# Patient Record
Sex: FEMALE | Race: OTHER | HISPANIC OR LATINO | Employment: OTHER | URBAN - METROPOLITAN AREA
[De-identification: names, ages, dates, MRNs, and addresses within clinical notes are randomized per-mention and may not be internally consistent; named-entity substitution may affect disease eponyms.]

---

## 2017-04-13 ENCOUNTER — CONVERSION ENCOUNTER (OUTPATIENT)
Dept: RADIOLOGY | Facility: IMAGING CENTER | Age: 72
End: 2017-04-13

## 2018-04-14 ENCOUNTER — CONVERSION ENCOUNTER (OUTPATIENT)
Dept: RADIOLOGY | Facility: IMAGING CENTER | Age: 73
End: 2018-04-14

## 2018-07-25 ENCOUNTER — APPOINTMENT (OUTPATIENT)
Dept: LAB | Facility: IMAGING CENTER | Age: 73
End: 2018-07-25
Payer: COMMERCIAL

## 2018-07-25 ENCOUNTER — TRANSCRIBE ORDERS (OUTPATIENT)
Dept: ADMINISTRATIVE | Facility: HOSPITAL | Age: 73
End: 2018-07-25

## 2018-07-25 DIAGNOSIS — E03.9 HYPOTHYROIDISM, UNSPECIFIED TYPE: Primary | ICD-10-CM

## 2018-07-25 DIAGNOSIS — I10 HYPERTENSION, UNSPECIFIED TYPE: ICD-10-CM

## 2018-07-25 DIAGNOSIS — Z79.899 LONG TERM USE OF DRUG: ICD-10-CM

## 2018-07-25 DIAGNOSIS — Z83.3 FAMILY HISTORY OF DIABETES MELLITUS: ICD-10-CM

## 2018-07-25 DIAGNOSIS — E78.2 MIXED HYPERLIPIDEMIA: ICD-10-CM

## 2018-07-25 DIAGNOSIS — E55.9 VITAMIN D DEFICIENCY, UNSPECIFIED: ICD-10-CM

## 2018-07-25 DIAGNOSIS — E66.01 MORBID OBESITY (HCC): ICD-10-CM

## 2018-07-25 DIAGNOSIS — IMO0002 UNCONTROLLED TYPE 2 DIABETES MELLITUS WITH COMPLICATION, WITHOUT LONG-TERM CURRENT USE OF INSULIN: ICD-10-CM

## 2018-07-25 DIAGNOSIS — Z79.899 LONG TERM USE OF DRUG: Primary | ICD-10-CM

## 2018-07-25 DIAGNOSIS — E03.9 HYPOTHYROIDISM, UNSPECIFIED TYPE: ICD-10-CM

## 2018-07-25 LAB
ALBUMIN SERPL BCP-MCNC: 3.6 G/DL (ref 3.5–5)
ALP SERPL-CCNC: 91 U/L (ref 46–116)
ALT SERPL W P-5'-P-CCNC: 18 U/L (ref 12–78)
ANION GAP SERPL CALCULATED.3IONS-SCNC: 4 MMOL/L (ref 4–13)
AST SERPL W P-5'-P-CCNC: 11 U/L (ref 5–45)
BASOPHILS # BLD AUTO: 0.05 THOUSANDS/ΜL (ref 0–0.1)
BASOPHILS NFR BLD AUTO: 1 % (ref 0–1)
BILIRUB SERPL-MCNC: 0.72 MG/DL (ref 0.2–1)
BUN SERPL-MCNC: 20 MG/DL (ref 5–25)
CALCIUM SERPL-MCNC: 9.7 MG/DL (ref 8.3–10.1)
CHLORIDE SERPL-SCNC: 106 MMOL/L (ref 100–108)
CO2 SERPL-SCNC: 28 MMOL/L (ref 21–32)
CREAT SERPL-MCNC: 0.83 MG/DL (ref 0.6–1.3)
EOSINOPHIL # BLD AUTO: 0.12 THOUSAND/ΜL (ref 0–0.61)
EOSINOPHIL NFR BLD AUTO: 1 % (ref 0–6)
ERYTHROCYTE [DISTWIDTH] IN BLOOD BY AUTOMATED COUNT: 14.6 % (ref 11.6–15.1)
EST. AVERAGE GLUCOSE BLD GHB EST-MCNC: 154 MG/DL
GFR SERPL CREATININE-BSD FRML MDRD: 71 ML/MIN/1.73SQ M
GLUCOSE P FAST SERPL-MCNC: 98 MG/DL (ref 65–99)
HBA1C MFR BLD: 7 % (ref 4.2–6.3)
HCT VFR BLD AUTO: 43 % (ref 34.8–46.1)
HGB BLD-MCNC: 13.5 G/DL (ref 11.5–15.4)
IMM GRANULOCYTES # BLD AUTO: 0.03 THOUSAND/UL (ref 0–0.2)
IMM GRANULOCYTES NFR BLD AUTO: 0 % (ref 0–2)
LYMPHOCYTES # BLD AUTO: 2.77 THOUSANDS/ΜL (ref 0.6–4.47)
LYMPHOCYTES NFR BLD AUTO: 32 % (ref 14–44)
MCH RBC QN AUTO: 26.7 PG (ref 26.8–34.3)
MCHC RBC AUTO-ENTMCNC: 31.4 G/DL (ref 31.4–37.4)
MCV RBC AUTO: 85 FL (ref 82–98)
MONOCYTES # BLD AUTO: 0.68 THOUSAND/ΜL (ref 0.17–1.22)
MONOCYTES NFR BLD AUTO: 8 % (ref 4–12)
NEUTROPHILS # BLD AUTO: 4.94 THOUSANDS/ΜL (ref 1.85–7.62)
NEUTS SEG NFR BLD AUTO: 58 % (ref 43–75)
NRBC BLD AUTO-RTO: 0 /100 WBCS
PLATELET # BLD AUTO: 351 THOUSANDS/UL (ref 149–390)
PMV BLD AUTO: 11 FL (ref 8.9–12.7)
POTASSIUM SERPL-SCNC: 4.1 MMOL/L (ref 3.5–5.3)
PROT SERPL-MCNC: 7.2 G/DL (ref 6.4–8.2)
RBC # BLD AUTO: 5.06 MILLION/UL (ref 3.81–5.12)
SODIUM SERPL-SCNC: 138 MMOL/L (ref 136–145)
T4 FREE SERPL-MCNC: 1.08 NG/DL (ref 0.76–1.46)
TSH SERPL DL<=0.05 MIU/L-ACNC: 2.14 UIU/ML (ref 0.36–3.74)
WBC # BLD AUTO: 8.59 THOUSAND/UL (ref 4.31–10.16)

## 2018-07-25 PROCEDURE — 84439 ASSAY OF FREE THYROXINE: CPT | Performed by: INTERNAL MEDICINE

## 2018-07-25 PROCEDURE — 85025 COMPLETE CBC W/AUTO DIFF WBC: CPT

## 2018-07-25 PROCEDURE — 80053 COMPREHEN METABOLIC PANEL: CPT

## 2018-07-25 PROCEDURE — 83036 HEMOGLOBIN GLYCOSYLATED A1C: CPT

## 2018-07-25 PROCEDURE — 84443 ASSAY THYROID STIM HORMONE: CPT | Performed by: INTERNAL MEDICINE

## 2018-07-25 PROCEDURE — 36415 COLL VENOUS BLD VENIPUNCTURE: CPT

## 2018-10-23 LAB
LEFT EYE DIABETIC RETINOPATHY: NORMAL
RIGHT EYE DIABETIC RETINOPATHY: NORMAL
SEVERITY (EYE EXAM): NORMAL

## 2018-11-26 ENCOUNTER — OFFICE VISIT (OUTPATIENT)
Dept: FAMILY MEDICINE CLINIC | Facility: CLINIC | Age: 73
End: 2018-11-26
Payer: COMMERCIAL

## 2018-11-26 VITALS
HEART RATE: 71 BPM | HEIGHT: 59 IN | DIASTOLIC BLOOD PRESSURE: 60 MMHG | TEMPERATURE: 98 F | SYSTOLIC BLOOD PRESSURE: 110 MMHG | WEIGHT: 217 LBS | RESPIRATION RATE: 16 BRPM | OXYGEN SATURATION: 97 % | BODY MASS INDEX: 43.75 KG/M2

## 2018-11-26 DIAGNOSIS — R16.0 LIVER MASS, LEFT LOBE: Primary | ICD-10-CM

## 2018-11-26 DIAGNOSIS — M79.604 PAIN OF RIGHT LOWER EXTREMITY: ICD-10-CM

## 2018-11-26 DIAGNOSIS — Z23 NEEDS FLU SHOT: ICD-10-CM

## 2018-11-26 PROCEDURE — 99213 OFFICE O/P EST LOW 20 MIN: CPT | Performed by: FAMILY MEDICINE

## 2018-11-26 PROCEDURE — 1036F TOBACCO NON-USER: CPT | Performed by: FAMILY MEDICINE

## 2018-11-26 PROCEDURE — 90662 IIV NO PRSV INCREASED AG IM: CPT

## 2018-11-26 PROCEDURE — 3008F BODY MASS INDEX DOCD: CPT | Performed by: FAMILY MEDICINE

## 2018-11-26 PROCEDURE — G0008 ADMIN INFLUENZA VIRUS VAC: HCPCS

## 2018-11-26 PROCEDURE — 1160F RVW MEDS BY RX/DR IN RCRD: CPT

## 2018-11-26 PROCEDURE — 1160F RVW MEDS BY RX/DR IN RCRD: CPT | Performed by: FAMILY MEDICINE

## 2018-11-26 RX ORDER — DAPAGLIFLOZIN 10 MG/1
10 TABLET, FILM COATED ORAL EVERY MORNING
Refills: 1 | COMMUNITY
Start: 2018-10-03 | End: 2019-05-28 | Stop reason: ALTCHOICE

## 2018-11-26 RX ORDER — LEVOTHYROXINE SODIUM 0.05 MG/1
50 TABLET ORAL DAILY
Refills: 1 | COMMUNITY
Start: 2018-09-12

## 2018-11-26 RX ORDER — ENALAPRIL MALEATE 5 MG/1
5 TABLET ORAL
Refills: 1 | COMMUNITY
Start: 2018-10-21 | End: 2019-10-28 | Stop reason: DRUGHIGH

## 2018-11-26 RX ORDER — LEVOTHYROXINE SODIUM 0.07 MG/1
75 TABLET ORAL DAILY
COMMUNITY

## 2018-11-26 RX ORDER — GLIMEPIRIDE 2 MG/1
1 TABLET ORAL
Refills: 1 | COMMUNITY
Start: 2018-10-21 | End: 2019-06-05 | Stop reason: ALTCHOICE

## 2018-11-26 RX ORDER — LOVASTATIN 40 MG/1
40 TABLET ORAL
Refills: 3 | COMMUNITY
Start: 2018-10-03

## 2018-11-26 RX ORDER — METFORMIN HYDROCHLORIDE 750 MG/1
750 TABLET, EXTENDED RELEASE ORAL 2 TIMES DAILY
Refills: 1 | COMMUNITY
Start: 2018-10-21 | End: 2019-05-28 | Stop reason: ALTCHOICE

## 2018-11-26 NOTE — PATIENT INSTRUCTIONS
Conteo básico de carbohidratos   CUIDADO AMBULATORIO:   El conteo de carbohidratos  es Debbie de planificar jorgito comidas contando la cantidad de carbohidratos de los alimentos  Jenna Carpen son los azúcares, almidones y fibras que se encuentran en las frutas, granos, verduras y productos lácteos  Los carbohidratos [employer] niveles de azúcar en la melissa  El conteo de carbohidratos puede ayudarle a comer la cantidad [place] de carbohidratos para mantener jorgito niveles de glucosa (azúcar) en melissa bajo control  Lo que necesita saber sobre la planificación de las comidas utilizando el conteo de carbohidratos:  · Un dietista o un médico le ayudará a desarrollar un plan alimenticio saludable que trabajará mejor para usted  Le enseñarán cuántos carbohidratos debe consumir o beber en cada comida o merienda  Perez plan alimenticio estará basado en perez edad, peso, dieta usual y [facility]  Si usted tiene diabetes, también incluirá perez nivel de azúcar en melissa y medicamentos para la diabetes  Cuando usted está informado de la cantidad de carbohidratos que debe consumir, entonces puede decidir los tipos de alimentos que quiere comer  · Necesitará saber qué alimentos contienen carbohidratos y cuántos contienen  Lleva la cuenta de la cantidad de carbohidratos en alimentos y meriendas para poder seguir perez plan alimenticio  No evite los carbohidratos ni omita comidas  Si usted no consume suficiente cantidad de carbohidratos u omite comidas, los niveles de azúcar en perez melissa pueden bajar excesivamente    Alimentos que contienen carbohidratos:   · Panes:  Cada porción de comida en la lista siguiente contiene cerca de 15 g de carbohidratos     ¨ 1 rebanada de pan (1 onza) o 1 tortilla de harina o maíz (6 pulgadas)    ¨ La ½ de pan para hamburguesa o ¼ de lanre rosquilla gurdeep (aproximadamente 1 onza)    ¨ 1 panqueque (4 pulgadas en diámetro y ¼ de [place])    · Cereales y granos:  [address] porciones de cereales listos para comer pueden variar  Jayson el tamaño de la porción y la cantidad de carbohidratos alistados en la etiqueta alimenticia  Cada porción de comida en la lista siguiente contiene cerca de 15 g de carbohidratos     ¨ ¾ taza de cereal seco, sin endulzar, listo para comer o ¼ taza de granola baja en grasa     ¨ ½ taza de layla u otros cereales cocidos     ¨ ? taza de arroz o pasta cocida    · Frijoles y vegetales con almidón:  Cada porción de comida en la lista siguiente contiene cerca de 15 g de carbohidratos     ¨ ½ de taza de maíz, chícharos verdes, camote o puré de papa    ¨ ¼ de lanre papa gurdeep horneada    ¨ ½ taza de frijoles, lentejas y guisantes (michele, akbar, jennifer, fitzgerald, partido, de saranya edgar)    · Gilbert y meriendas:  Cada porción de comida en la lista siguiente contiene cerca de 15 g de carbohidratos     ¨ 3 galletas de harina cuadradas u 8 galletas en forma de animalitos     ¨ 6 galletas saladas    ¨ 3 tazas de palomitas de [place] o ¾ onzas de pretzels, honey fritas o totopos    · Frutas:  Cada porción de comida en la lista siguiente contiene cerca de 15 g de carbohidratos     ¨ 1 pieza pequeña (4 onzas) de fruta fresca o ¾ a 1 taza de fruta fresca    ¨ ½ taza de fruta enlatada o congelada, envasada en jugo natural    ¨ ½ taza (4 onzas) de [place] de fruta sin azúcar    ¨ 2 cucharadas de fruta seca    · Alimentos azucarados o postres:  Cada porción de comida en la lista siguiente contiene cerca de 15 g de carbohidratos     ¨ 1 sumaya de 2 pulgadas de pastel sin glaseado o brownie     ¨ 2 galletas dulces pequeñas    ¨ ½ taza de helado, yogur congelado o yogur congelado sin lactosa    ¨ ¼ de taza de sorbete    ¨ 1 cucharada de [place] regular, mermelada o Shaggy    ¨ 2 cucharadas de jarabe ligero    · [place] y yogur:  Los alimentos derivados de la leche contienen cerca de 12 g de carbohidratos por ración      ¨ 1 taza de leche sin grasa o baja en grasa    ¨ [address] soja    ¨ 1 taza de yogur sin grasa que ha sido endulzado con endulzante artificial    · Verduras sin almidón:  Cada porción de comida contiene cerca de 5 g de carbohidratos [place] porciones de vegetales sin almidón cuentan fela 1 porción de carbohidratos  ¨ ½ de taza de verduras cocidas o 1 taza de verduras crudas Estas incluyen remolachas, bróculi, repollo, coliflor, pepino, champiñones, tomates y calabaza  ¨ ½ taza de jugo de verduras  Cómo utilizar el conteo de carbohidratos para planificar las comidas:   · [employer] las cantidades de carbohidratos usando el tamaño de las porciones:      ¨ Ejemplo de lanre ynes de pasta:  Planifica comer pasta, ensalada mixta y un vaso de 8 onzas de [place]  Decker médico le dice que puede consumir 4 porciones de carbohidratos para la ynes  Lanre porción de carbohidratos de pasta es ? de taza  Lanre taza de pasta será igual a 3 porciones de carbohidratos  Un vaso de 8 onzas de leche se cuenta fela 1 porción de carbohidratos  Estas cantidades de alimentos sería igual a 4 porciones de carbohidratos  Lanre taza de ensalada mixta no cuenta para las porciones de carbohidratos  · Cuente la cantidad de carbohidratos utilizando las etiquetas alimenticias:  Busque la cantidad total de los carbohidratos en los alimentos envasados leyendo la etiqueta alimenticia  Las etiquetas alimenticias explican el tamaño de la porción del alimento y la cantidad total de los carbohidratos en cada porción  Busque el tamaño de la porción en la etiqueta alimenticia y después decida cuántas porciones comerá  Multiplique el número de porciones que planea comer por la cantidad de carbohidratos por porción  ¨ Ejemplo de lanre merienda con lanre catalino de granola: Decker plan de comidas le permite consumir 2 porciones de carbohidratos (30 gramos) fela bocadillo  Planea comer 1 paquete de barras de granola, el cual contiene 2 barras  Según la etiqueta alimenticia, el tamaño de la porción en mihir paquete es 1 catalino   Cada porción (1 catalino) contiene 25 gramos de carbohidratos  La cantidad total de carbohidratos en el paquete de barras de granola sería 50 gramos  Basándose en decker plan alimenticio, usted debe de comer sólo 1 catalino de granola  Acuda a jorgito consultas de control con decker médico según le indicaron  Anote jorgito preguntas para que se acuerde de hacerlas munira jorgito visitas  © 2017 [facility] Information is for End User's use only and may not be sold, redistributed or otherwise used for commercial purposes  All illustrations and images included in CareNotes® are the copyrighted property of A AMADEO A M , Inc  or Yovany Lowry  Esta información es sólo para uso en educación  Decker intención no es darle un consejo médico sobre enfermedades o tratamientos  Colsulte con decker Geoff Pinta farmacéutico antes de seguir cualquier régimen médico para saber si es seguro y efectivo para usted

## 2018-11-27 ENCOUNTER — APPOINTMENT (OUTPATIENT)
Dept: LAB | Facility: IMAGING CENTER | Age: 73
End: 2018-11-27
Payer: COMMERCIAL

## 2018-11-27 ENCOUNTER — TRANSCRIBE ORDERS (OUTPATIENT)
Dept: ADMINISTRATIVE | Facility: HOSPITAL | Age: 73
End: 2018-11-27

## 2018-11-27 ENCOUNTER — HOSPITAL ENCOUNTER (OUTPATIENT)
Dept: RADIOLOGY | Facility: IMAGING CENTER | Age: 73
Discharge: HOME | End: 2018-11-27
Payer: COMMERCIAL

## 2018-11-27 DIAGNOSIS — E11.9 TYPE 2 DIABETES MELLITUS WITHOUT COMPLICATION, UNSPECIFIED WHETHER LONG TERM INSULIN USE (HCC): ICD-10-CM

## 2018-11-27 DIAGNOSIS — I10 ESSENTIAL HYPERTENSION, MALIGNANT: ICD-10-CM

## 2018-11-27 DIAGNOSIS — I51.9 MYXEDEMA HEART DISEASE: ICD-10-CM

## 2018-11-27 DIAGNOSIS — E78.00 PURE HYPERCHOLESTEROLEMIA: ICD-10-CM

## 2018-11-27 DIAGNOSIS — E55.9 AVITAMINOSIS D: ICD-10-CM

## 2018-11-27 DIAGNOSIS — E03.9 MYXEDEMA HEART DISEASE: ICD-10-CM

## 2018-11-27 DIAGNOSIS — E66.01 MORBID OBESITY (HCC): ICD-10-CM

## 2018-11-27 DIAGNOSIS — Z79.899 ENCOUNTER FOR LONG-TERM (CURRENT) USE OF MEDICATIONS: ICD-10-CM

## 2018-11-27 DIAGNOSIS — R16.0 LIVER MASS, LEFT LOBE: ICD-10-CM

## 2018-11-27 DIAGNOSIS — E11.9 TYPE 2 DIABETES MELLITUS WITHOUT COMPLICATION, UNSPECIFIED WHETHER LONG TERM INSULIN USE (HCC): Primary | ICD-10-CM

## 2018-11-27 LAB
25(OH)D3 SERPL-MCNC: 53.7 NG/ML (ref 30–100)
ALBUMIN SERPL BCP-MCNC: 3.6 G/DL (ref 3.5–5)
ALP SERPL-CCNC: 105 U/L (ref 46–116)
ALT SERPL W P-5'-P-CCNC: 21 U/L (ref 12–78)
ANION GAP SERPL CALCULATED.3IONS-SCNC: 5 MMOL/L (ref 4–13)
AST SERPL W P-5'-P-CCNC: 12 U/L (ref 5–45)
BASOPHILS # BLD AUTO: 0.04 THOUSANDS/ΜL (ref 0–0.1)
BASOPHILS NFR BLD AUTO: 1 % (ref 0–1)
BILIRUB SERPL-MCNC: 0.94 MG/DL (ref 0.2–1)
BUN SERPL-MCNC: 20 MG/DL (ref 5–25)
CALCIUM ALBUM COR SERPL-MCNC: 10.6 MG/DL (ref 8.3–10.1)
CALCIUM SERPL-MCNC: 10.3 MG/DL (ref 8.3–10.1)
CHLORIDE SERPL-SCNC: 104 MMOL/L (ref 100–108)
CHOLEST SERPL-MCNC: 136 MG/DL (ref 50–200)
CO2 SERPL-SCNC: 26 MMOL/L (ref 21–32)
CREAT SERPL-MCNC: 0.85 MG/DL (ref 0.6–1.3)
EOSINOPHIL # BLD AUTO: 0.09 THOUSAND/ΜL (ref 0–0.61)
EOSINOPHIL NFR BLD AUTO: 1 % (ref 0–6)
ERYTHROCYTE [DISTWIDTH] IN BLOOD BY AUTOMATED COUNT: 15 % (ref 11.6–15.1)
GFR SERPL CREATININE-BSD FRML MDRD: 69 ML/MIN/1.73SQ M
GLUCOSE P FAST SERPL-MCNC: 168 MG/DL (ref 65–99)
HCT VFR BLD AUTO: 44.6 % (ref 34.8–46.1)
HDLC SERPL-MCNC: 55 MG/DL (ref 40–60)
HGB BLD-MCNC: 14.3 G/DL (ref 11.5–15.4)
IMM GRANULOCYTES # BLD AUTO: 0.03 THOUSAND/UL (ref 0–0.2)
IMM GRANULOCYTES NFR BLD AUTO: 0 % (ref 0–2)
LDLC SERPL CALC-MCNC: 51 MG/DL (ref 0–100)
LYMPHOCYTES # BLD AUTO: 1.39 THOUSANDS/ΜL (ref 0.6–4.47)
LYMPHOCYTES NFR BLD AUTO: 20 % (ref 14–44)
MCH RBC QN AUTO: 27.7 PG (ref 26.8–34.3)
MCHC RBC AUTO-ENTMCNC: 32.1 G/DL (ref 31.4–37.4)
MCV RBC AUTO: 86 FL (ref 82–98)
MONOCYTES # BLD AUTO: 0.72 THOUSAND/ΜL (ref 0.17–1.22)
MONOCYTES NFR BLD AUTO: 10 % (ref 4–12)
NEUTROPHILS # BLD AUTO: 4.64 THOUSANDS/ΜL (ref 1.85–7.62)
NEUTS SEG NFR BLD AUTO: 68 % (ref 43–75)
NONHDLC SERPL-MCNC: 81 MG/DL
NRBC BLD AUTO-RTO: 0 /100 WBCS
PLATELET # BLD AUTO: 327 THOUSANDS/UL (ref 149–390)
PMV BLD AUTO: 11.7 FL (ref 8.9–12.7)
POTASSIUM SERPL-SCNC: 4.2 MMOL/L (ref 3.5–5.3)
PROT SERPL-MCNC: 7.4 G/DL (ref 6.4–8.2)
RBC # BLD AUTO: 5.17 MILLION/UL (ref 3.81–5.12)
SODIUM SERPL-SCNC: 135 MMOL/L (ref 136–145)
T4 FREE SERPL-MCNC: 1.12 NG/DL (ref 0.76–1.46)
TRIGL SERPL-MCNC: 150 MG/DL
TSH SERPL DL<=0.05 MIU/L-ACNC: 2.4 UIU/ML (ref 0.36–3.74)
WBC # BLD AUTO: 6.91 THOUSAND/UL (ref 4.31–10.16)

## 2018-11-27 PROCEDURE — 76705 ECHO EXAM OF ABDOMEN: CPT

## 2018-11-27 PROCEDURE — 36415 COLL VENOUS BLD VENIPUNCTURE: CPT

## 2018-11-27 PROCEDURE — 85025 COMPLETE CBC W/AUTO DIFF WBC: CPT

## 2018-11-27 PROCEDURE — 82306 VITAMIN D 25 HYDROXY: CPT

## 2018-11-27 PROCEDURE — 80053 COMPREHEN METABOLIC PANEL: CPT

## 2018-11-27 PROCEDURE — 80061 LIPID PANEL: CPT

## 2018-11-27 PROCEDURE — 84439 ASSAY OF FREE THYROXINE: CPT

## 2018-11-27 PROCEDURE — 83036 HEMOGLOBIN GLYCOSYLATED A1C: CPT

## 2018-11-27 PROCEDURE — 84443 ASSAY THYROID STIM HORMONE: CPT

## 2018-11-28 ENCOUNTER — TELEPHONE (OUTPATIENT)
Dept: FAMILY MEDICINE CLINIC | Facility: CLINIC | Age: 73
End: 2018-11-28

## 2018-11-28 LAB
EST. AVERAGE GLUCOSE BLD GHB EST-MCNC: 169 MG/DL
HBA1C MFR BLD: 7.5 % (ref 4.2–6.3)

## 2018-12-04 ENCOUNTER — TELEPHONE (OUTPATIENT)
Dept: FAMILY MEDICINE CLINIC | Facility: CLINIC | Age: 73
End: 2018-12-04

## 2018-12-04 NOTE — TELEPHONE ENCOUNTER
Left message to patient to call back regarding below task, need to schedule appt with dr Barry JARAMILLO

## 2018-12-05 ENCOUNTER — OFFICE VISIT (OUTPATIENT)
Dept: FAMILY MEDICINE CLINIC | Facility: CLINIC | Age: 73
End: 2018-12-05
Payer: COMMERCIAL

## 2018-12-05 VITALS
HEIGHT: 59 IN | SYSTOLIC BLOOD PRESSURE: 120 MMHG | DIASTOLIC BLOOD PRESSURE: 60 MMHG | TEMPERATURE: 99.4 F | WEIGHT: 214.2 LBS | HEART RATE: 89 BPM | BODY MASS INDEX: 43.18 KG/M2 | RESPIRATION RATE: 20 BRPM | OXYGEN SATURATION: 96 %

## 2018-12-05 DIAGNOSIS — J40 BRONCHITIS: Primary | ICD-10-CM

## 2018-12-05 DIAGNOSIS — E11.9 TYPE 2 DIABETES MELLITUS WITHOUT COMPLICATION, WITH LONG-TERM CURRENT USE OF INSULIN (HCC): ICD-10-CM

## 2018-12-05 DIAGNOSIS — R05.9 COUGH: ICD-10-CM

## 2018-12-05 DIAGNOSIS — Z79.4 TYPE 2 DIABETES MELLITUS WITHOUT COMPLICATION, WITH LONG-TERM CURRENT USE OF INSULIN (HCC): ICD-10-CM

## 2018-12-05 PROCEDURE — 99214 OFFICE O/P EST MOD 30 MIN: CPT | Performed by: NURSE PRACTITIONER

## 2018-12-05 PROCEDURE — 3008F BODY MASS INDEX DOCD: CPT | Performed by: NURSE PRACTITIONER

## 2018-12-05 PROCEDURE — 1160F RVW MEDS BY RX/DR IN RCRD: CPT | Performed by: NURSE PRACTITIONER

## 2018-12-05 PROCEDURE — 4040F PNEUMOC VAC/ADMIN/RCVD: CPT | Performed by: NURSE PRACTITIONER

## 2018-12-05 RX ORDER — AZITHROMYCIN 250 MG/1
TABLET, FILM COATED ORAL
Qty: 6 TABLET | Refills: 0 | Status: SHIPPED | OUTPATIENT
Start: 2018-12-05 | End: 2018-12-09

## 2018-12-05 RX ORDER — BENZONATATE 200 MG/1
200 CAPSULE ORAL 3 TIMES DAILY PRN
Qty: 20 CAPSULE | Refills: 0 | Status: SHIPPED | OUTPATIENT
Start: 2018-12-05 | End: 2018-12-07 | Stop reason: SDUPTHER

## 2018-12-05 NOTE — ASSESSMENT & PLAN NOTE
Recommended to use benzonatate 200mg and conservative measures discussed with patient  If s/s worsen or do not improve after 1 week to return to clinic  Pt in agreement with plan of care

## 2018-12-05 NOTE — PROGRESS NOTES
Assessment/Plan:    Bronchitis  I am starting patient on zithromax and tessalon pills for bronchitis  I Recommended warm moist air, hyrdation, warm facial packs, turning on hot shower and inhaling steam to help ease with breathing  Avoid extremely cool and dry air  Nasal saline may provide temporary relief of congestion by removing nasal crusts and dried secretions  Drinking 8 or more 8-oz glasses of water, juice, or noncaffeinated beverages daily may help to thin mucous secretions and replace fluid losses  Sipping hot water or warm drinks may be more soothing to the nasal passages than ice cold drinks  Should follow up if symptoms do not improve, worsen within 72 hours, or recur  Cough  Recommended to use benzonatate 200mg and conservative measures discussed with patient  If s/s worsen or do not improve after 1 week to return to clinic  Pt in agreement with plan of care  Type 2 diabetes mellitus without complication, with long-term current use of insulin ([facility])  Lab Results   Component Value Date    HGBA1C 7 5 (H) 11/27/2018       No results for input(s): POCGLU in the last 72 hours  Blood Sugar Average: Last 72 hrs:     Pt to continue on metformin, amaryl, farxiga and dulaglutide injections  No changes made to regiment  Advised to keep appointment every 3-6 months for DM check  No tussin given as this can raise her blood sugar  Discussed with patient and pt ok with benzonatate for cough  Diagnoses and all orders for this visit:    Bronchitis  -     azithromycin (ZITHROMAX) 250 mg tablet; Take 2 tablets today then 1 tablet daily x 4 days  -     benzonatate (TESSALON) 200 MG capsule; Take 1 capsule (200 mg total) by mouth 3 (three) times a day as needed for cough    Cough    Type 2 diabetes mellitus without complication, with long-term current use of insulin ([facility])          Subjective:      Patient ID: Amelia Flowers is a 72 y o  female      Last week had flu shot and her body started aching on Thursday and the entire week  Coughing a lot x 1 week  Its constant with the cough  Admits to nasal congestion  Low grade fever per patient  Cough   This is a new problem  The current episode started in the past 7 days  The problem has been gradually worsening  The problem occurs constantly  The cough is productive of sputum  Associated symptoms include chills, a fever, headaches, nasal congestion, postnasal drip and wheezing  Pertinent negatives include no chest pain, ear congestion, ear pain or sore throat  The symptoms are aggravated by cold air  She has tried OTC cough suppressant for the symptoms  The treatment provided mild relief  Her past medical history is significant for bronchitis  There is no history of asthma, COPD, environmental allergies or pneumonia  The following portions of the patient's history were reviewed and updated as appropriate: allergies, current medications, past family history, past medical history, past social history, past surgical history and problem list     Review of Systems   Constitutional: Positive for chills and fever  HENT: Positive for congestion and postnasal drip  Negative for ear discharge, ear pain, sneezing and sore throat  Eyes: Negative  Respiratory: Positive for cough and wheezing  Cardiovascular: Negative  Negative for chest pain and palpitations  Gastrointestinal: Negative  Endocrine: Negative  Negative for cold intolerance, polydipsia, polyphagia and polyuria  Musculoskeletal: Negative  Skin: Negative  Allergic/Immunologic: Negative for environmental allergies  Neurological: Positive for headaches  Objective:      /60   Pulse 89   Temp 99 4 °F (37 4 °C) (Temporal)   Resp 20   Ht 4' 11" (1 499 m)   Wt 97 2 kg (214 lb 3 2 oz)   SpO2 96%   BMI 43 26 kg/m²          Physical Exam   Constitutional: She is oriented to person, place, and time  She appears well-developed and well-nourished  No distress     HENT:   Head: Normocephalic and atraumatic  Right Ear: Hearing, tympanic membrane, external ear and ear canal normal    Left Ear: Hearing, tympanic membrane, external ear and ear canal normal    Nose: Rhinorrhea present  Mouth/Throat: Uvula is midline and mucous membranes are normal  Posterior oropharyngeal erythema present  Eyes: Pupils are equal, round, and reactive to light  Conjunctivae and EOM are normal    Neck: Normal range of motion  Neck supple  Cardiovascular: Normal rate, regular rhythm and normal heart sounds  Pulmonary/Chest: Effort normal and breath sounds normal  No respiratory distress  She has no wheezes  Abdominal: Soft  Bowel sounds are normal    Musculoskeletal: Normal range of motion  Lymphadenopathy:     She has cervical adenopathy  Neurological: She is alert and oriented to person, place, and time  Skin: Skin is warm and dry  She is not diaphoretic  Nursing note and vitals reviewed

## 2018-12-05 NOTE — ASSESSMENT & PLAN NOTE
I am starting patient on zithromax and tessalon pills for bronchitis  I Recommended warm moist air, hyrdation, warm facial packs, turning on hot shower and inhaling steam to help ease with breathing  Avoid extremely cool and dry air  Nasal saline may provide temporary relief of congestion by removing nasal crusts and dried secretions  Drinking 8 or more 8-oz glasses of water, juice, or noncaffeinated beverages daily may help to thin mucous secretions and replace fluid losses  Sipping hot water or warm drinks may be more soothing to the nasal passages than ice cold drinks  Should follow up if symptoms do not improve, worsen within 72 hours, or recur

## 2018-12-05 NOTE — PATIENT INSTRUCTIONS
Bronquitis aguda   LO QUE NECESITA SABER:   ¿Qué es la bronquitis aguda? La bronquitis aguda es la inflamación e irritación de jorgito vías respiratorias  Esta irritación puede provocar que tosa o que tenga otros problemas de respiración  La bronquitis aguda suele comenzar debido a otra enfermedad, fela un resfriado o la gripe  La enfermedad se propaga de perez nariz y garganta a perez tráquea y [place]  La bronquitis a menudo es conocida fela resfriado de pecho  La bronquitis aguda generalmente dura alrededor de 3 a 6 semanas y no es lanre enfermedad grave  ¿Qué provoca la bronquitis aguda? · Infección  provocada por virus, bacteria o por hongos  · El aire contaminado  provocado por vapores químicos, polvo, humo, alérgenos o contaminación  ¿Qué aumenta mi riesgo de bronquitis aguda? · La edad, usualmente adultos [place]    · Fumar cigarrillos o estar alrededor de el humo del cigarro    · Enfermedades crónicas del pulmón o infecicones crónicas del seno    · Un sistema inmune débil    · La enfermedad por reflujo gastroesofágico o [place]    · Alergias y cambios ambientales  ¿Cuáles son los signos y síntomas de la bronquitis aguda? · Tos con esputo que podría ser [place] o sandy    · Sentirse más cansado de lo normal y pipo corporales    · Comoros y escalofríos    · Resuello cuando respira    · Opresión en el pecho o dolor cuando respira o tose  ¿Cómo se diagnostica la bronquitis aguda? Perez médico podría diagnosticarlo con bronquitis por jorgito síntomas  Si él no está seguro, es posible que usted necesite lo siguiente:  · Los análisis de melissa:  serán realizados para determinar si jorgito síntomas son provocados por lanre infección  · Radiografía  de jorgito pulmones y de perez corazón podrían mostrar signos de infección, fela neumonía  Inidgo Fake de tórax también puede mostrar líquido alrededor del corazón y los pulmones  ¿Cómo se trata la bronquitis crónica?   Perez médico tratará cualquier [employer] le haya provocado perez bronquitis aguda  También podría darle cualquiera de lo siguiente:  · El ibuprofeno o el acetaminofeno  son medicamentos que pueden ayudar a bajar perez fiebre  Están disponibles sin receta médica  Consulte con perez médico cuál medicamento es el adecuado para usted  Pregunte la cantidad y la frecuencia con que debe tomarlos  [address]  Estos medicamentos pueden provocar sangrado estomacal si no se jolly correctamente  El ibuprofeno puede provocar daño al Cristobal Schooner  No tome ibuprofeno si usted tiene enfermedad de los riñones, [place] o si es alérgico a la aspirina  El acetaminofeno puede dañar el hígado  No tome más de 4,000 miligramos en 24 horas  · Los descongestionantes  ayudan a despejar la mucosidad en jorgito pulmones y que sea más fácil expectorarla  [place] puede ayudarle a respirar mejor  · Los jarabes para la tos  disminuyen perez necesidad de toser  Si perez tos produce mucosidad, no tome un supresor de la tos a menos que perez médico se lo indique  Perez médico podría sugerirle que tome un supresor de la tos en la noche para que pueda descansar  · Inhaladores  podrían ser Lendell Pitch  Perez médico podría darle bruce o más inhaladores para ayudarle a respirar más fácil y Patti Pacini menos tos  Un inhalador le administra medicamento para abrir jorgito vías aéreas  Pídale a perez médico que le muestre cómo usar perez inhalador correctamente  ¿Cómo me puedo cuidar cuando tengo bronquitis aguda? · Descanse más  El descanso ayuda a perez cuerpo a sanar  Empiece a hacer un poco más día a día  Descanse cuando usted sienta que es necesario  · Evite irritantes en el aire  Evite productos químicos, gases y polvo  Use lanre mascarilla si tiene que trabajar alrededor de polvo o gases  Permanezca dentro cuando los niveles de contaminación teresa altos  Si tiene alergias, permanezca adentro cuando el conteo de polen sea [place]OS[place]AN[place]BY   No use productos en aerosol, fela desodorante, aerosol contra los insectos y aerosol para el norma  · No fume o esté alrededor de personas que fuman  La nicotina y otros químicos en los cigarrillos y cigarros dañan la cilia que saca la mucosidad de jorgito pulmones  Pida información a perez médico si usted actualmente fuma y necesita ayuda para dejar de fumar  Los cigarrillos electrónicos o tabaco sin humo todavía contienen nicotina  Consulte con perez médico antes de [employer]  · [address] se le haya indicado  Los líquidos ayudan a mantener humectadas jorgito vías respiratorias y ayudarle a eliminar las flemas por medio de la tos  Es posible que usted necesite jared más líquidos si tiene bronquitis [place]  Pregunte cuánto líquido debe jared cada día y cuáles líquidos son los más adecuados para usted  · Use un humidificador o vaporizador  Use un humidificador de talib frío o un vaporizador para elevar la humedad en perez casa  [place] podría ayudarle a respirar más fácilmente y al mismo tiempo disminuir la tos  ¿Cómo puedo disminuir mi riesgo para la bronquitis aguda? · Vaya a que le pongan las vacunas necesarias  Pregúntele a perez médico si usted debería ser vacunado contra la gripe o la neumonía  · Evite la propagación de gérmenes  Usted puede disminuir perez riesgo de bronquitis [place] y otras enfermedades de la siguiente manera:     ¨ [place] frecuentemente con agua y [place]  Lleve lanre loción o gel antiséptico para las milan  Usted puede usar la loción o el gel para limpiar jorgito milan cuando no haya agua disponible  ¨ No se toque los ojos, la nariz o la boca a menos que se haya lavado las milan aren  ¨ Siempre cubra perez boca al toser para evitar la propagación de gérmenes  Lo mejor es toser en un pañuelo desechable o en la manga de perez camisa, en lugar de en perez mano  Pídale a los que le rodean que cubran jorgito bocas al toser  ¨ Trate de evitar a las personas que están resfriadas o tienen gripe   Si usted está enfermo, manténgase alejado de otras personas lo [employer] pueda   ¿Cuándo hung buscar atención inmediata? · Usted expectora melissa  · Carol labios o uñas de los dedos se ponen azules  · Usted siente que no está recibiendo suficiente aire cuando respira  ¿Cuándo hung comunicarme con mi médico?   · Usted tiene fiebre  · Carol problemas respiratorios no desaparecen o empeoran  · Addison tos no mejora dentro de 4 semanas  · Usted tiene preguntas o inquietudes acerca de addison condición o cuidado  ACUERDOS SOBRE ADDISON CUIDADO:   Usted tiene el derecho de ayudar a planear addison cuidado  Aprenda todo lo que pueda sobre addison condición y fela darle tratamiento  Discuta carol opciones de tratamiento con carol médicos para decidir el cuidado que usted desea recibir  Usted siempre tiene el derecho de rechazar el tratamiento  Esta información es sólo para uso en educación  Addison intención no es darle un consejo médico sobre enfermedades o tratamientos  Colsulte con addison Ozie Sharp farmacéutico antes de seguir cualquier régimen médico para saber si es seguro y efectivo para usted  © 2017 [address] Information is for End User's use only and may not be sold, redistributed or otherwise used for commercial purposes  All illustrations and images included in CareNotes® are the copyrighted property of A D A M , Inc  or Yovany Lowry

## 2018-12-07 DIAGNOSIS — J40 BRONCHITIS: ICD-10-CM

## 2018-12-10 RX ORDER — BENZONATATE 200 MG/1
200 CAPSULE ORAL 3 TIMES DAILY PRN
Qty: 20 CAPSULE | Refills: 0 | Status: SHIPPED | OUTPATIENT
Start: 2018-12-10 | End: 2019-06-05 | Stop reason: ALTCHOICE

## 2018-12-19 ENCOUNTER — OFFICE VISIT (OUTPATIENT)
Dept: FAMILY MEDICINE CLINIC | Facility: CLINIC | Age: 73
End: 2018-12-19
Payer: COMMERCIAL

## 2018-12-19 VITALS
RESPIRATION RATE: 16 BRPM | WEIGHT: 218 LBS | OXYGEN SATURATION: 96 % | SYSTOLIC BLOOD PRESSURE: 120 MMHG | DIASTOLIC BLOOD PRESSURE: 70 MMHG | HEIGHT: 59 IN | TEMPERATURE: 98 F | HEART RATE: 68 BPM | BODY MASS INDEX: 43.95 KG/M2

## 2018-12-19 DIAGNOSIS — K80.20 GALLSTONES: ICD-10-CM

## 2018-12-19 DIAGNOSIS — R16.0 LIVER MASSES: Primary | ICD-10-CM

## 2018-12-19 PROCEDURE — 1036F TOBACCO NON-USER: CPT | Performed by: FAMILY MEDICINE

## 2018-12-19 PROCEDURE — 99214 OFFICE O/P EST MOD 30 MIN: CPT | Performed by: FAMILY MEDICINE

## 2018-12-19 PROCEDURE — 1160F RVW MEDS BY RX/DR IN RCRD: CPT | Performed by: FAMILY MEDICINE
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
## 2018-12-19 NOTE — PATIENT INSTRUCTIONS
Conteo básico de carbohidratos   CUIDADO AMBULATORIO:   El conteo de carbohidratos  es Debbie de planificar jorgito comidas contando la cantidad de carbohidratos de los alimentos  [place] son los azúcares, almidones y fibras que se encuentran en las frutas, granos, verduras y productos lácteos  Los carbohidratos [employer] niveles de azúcar en la melissa  El conteo de carbohidratos puede ayudarle a comer la cantidad [place] de carbohidratos para mantener jorgito niveles de glucosa (azúcar) en melissa bajo control  Lo que necesita saber sobre la planificación de las comidas utilizando el conteo de carbohidratos:  · Un dietista o un médico le ayudará a desarrollar un plan alimenticio saludable que trabajará mejor para usted  Le enseñarán cuántos carbohidratos debe consumir o beber en cada comida o merienda  Perez plan alimenticio estará basado en perez edad, peso, dieta usual y [address]  Si usted tiene diabetes, también incluirá perez nivel de azúcar en melissa y medicamentos para la diabetes  Cuando usted está informado de la cantidad de carbohidratos que debe consumir, entonces puede decidir los tipos de alimentos que quiere comer  · Necesitará saber qué alimentos contienen carbohidratos y cuántos contienen  Lleva la cuenta de la cantidad de carbohidratos en alimentos y meriendas para poder seguir perez plan alimenticio  No evite los carbohidratos ni omita comidas  Si usted no consume suficiente cantidad de carbohidratos u omite comidas, los niveles de azúcar en perez melissa pueden bajar excesivamente    Alimentos que contienen carbohidratos:   · Panes:  Cada porción de comida en la lista siguiente contiene cerca de 15 g de carbohidratos     ¨ 1 rebanada de pan (1 onza) o 1 tortilla de harina o maíz (6 pulgadas)    ¨ La ½ de pan para hamburguesa o ¼ de lanre rosquilla gurdeep (aproximadamente 1 onza)    ¨ 1 panqueque (4 pulgadas en diámetro y ¼ de [place])    · Cereales y granos:  [address] porciones de cereales listos para comer pueden variar  Jayson el tamaño de la porción y la cantidad de carbohidratos alistados en la etiqueta alimenticia  Cada porción de comida en la lista siguiente contiene cerca de 15 g de carbohidratos     ¨ ¾ taza de cereal seco, sin endulzar, listo para comer o ¼ taza de granola baja en grasa     ¨ ½ taza de layla u otros cereales cocidos     ¨ ? taza de arroz o pasta cocida    · Frijoles y vegetales con almidón:  Cada porción de comida en la lista siguiente contiene cerca de 15 g de carbohidratos     ¨ ½ de taza de maíz, chícharos verdes, camote o puré de papa    ¨ ¼ de lanre papa gurdeep horneada    ¨ ½ taza de frijoles, lentejas y guisantes (michele, akbar, jennifer, fitzgerald, partido, de saranya edgar)    · [place] y meriendas:  Cada porción de comida en la lista siguiente contiene cerca de 15 g de carbohidratos     ¨ 3 galletas de harina cuadradas u 8 galletas en forma de animalitos     ¨ 6 galletas saladas    ¨ 3 tazas de palomitas de [place] o ¾ onzas de pretzels, honey fritas o totopos    · Frutas:  Cada porción de comida en la lista siguiente contiene cerca de 15 g de carbohidratos     ¨ 1 pieza pequeña (4 onzas) de fruta fresca o ¾ a 1 taza de fruta fresca    ¨ ½ taza de fruta enlatada o congelada, envasada en jugo natural    ¨ ½ taza (4 onzas) de [place] de fruta sin azúcar    ¨ 2 cucharadas de fruta seca    · Alimentos azucarados o postres:  Cada porción de comida en la lista siguiente contiene cerca de 15 g de carbohidratos     ¨ 1 sumaya de 2 pulgadas de pastel sin glaseado o brownie     ¨ 2 galletas dulces pequeñas    ¨ ½ taza de helado, yogur congelado o yogur congelado sin lactosa    ¨ ¼ de taza de sorbete    ¨ 1 cucharada de [place] regular, mermelada o Shaggy    ¨ 2 cucharadas de jarabe ligero    · [place] y yogur:  Los alimentos derivados de la leche contienen cerca de 12 g de carbohidratos por ración      ¨ 1 taza de leche sin grasa o baja en grasa    ¨ [address] soja    ¨ 1 taza de yogur sin grasa que ha sido endulzado con endulzante artificial    · Verduras sin almidón:  Cada porción de comida contiene cerca de 5 g de carbohidratos [place] porciones de vegetales sin almidón cuentan fela 1 porción de carbohidratos  ¨ ½ de taza de verduras cocidas o 1 taza de verduras crudas [place] incluyen remolachas, bróculi, repollo, coliflor, pepino, champiñones, tomates y calabaza  ¨ ½ taza de jugo de verduras  Cómo utilizar el conteo de carbohidratos para planificar las comidas:   · [employer] las cantidades de carbohidratos usando el tamaño de las porciones:      ¨ Ejemplo de lanre ynes de pasta:  Planifica comer pasta, ensalada mixta y un vaso de 8 onzas de [place]  Decker médico le dice que puede consumir 4 porciones de carbohidratos para la ynes  Lanre porción de carbohidratos de pasta es ? de taza  Lanre taza de pasta será igual a 3 porciones de carbohidratos  Un vaso de 8 onzas de leche se cuenta fela 1 porción de carbohidratos  Estas cantidades de alimentos sería igual a 4 porciones de carbohidratos  Lanre taza de ensalada mixta no cuenta para las porciones de carbohidratos  · Cuente la cantidad de carbohidratos utilizando las etiquetas alimenticias:  Busque la cantidad total de los carbohidratos en los alimentos envasados leyendo la etiqueta alimenticia  Las etiquetas alimenticias explican el tamaño de la porción del alimento y la cantidad total de los carbohidratos en cada porción  Busque el tamaño de la porción en la etiqueta alimenticia y después decida cuántas porciones comerá  Multiplique el número de porciones que planea comer por la cantidad de carbohidratos por porción  ¨ Ejemplo de lanre merienda con lanre catalino de granola: Decker plan de comidas le permite consumir 2 porciones de carbohidratos (30 gramos) fela bocadillo  Planea comer 1 paquete de barras de granola, el cual contiene 2 barras  Según la etiqueta alimenticia, el tamaño de la porción en mihir paquete es 1 catalino   Cada porción (1 catalino) contiene 25 gramos de carbohidratos  La cantidad total de carbohidratos en el paquete de barras de granola sería 50 gramos  Basándose en decker plan alimenticio, usted debe de comer sólo 1 catalino de granola  Acuda a jorgito consultas de control con decker médico según le indicaron  Anote jorgito preguntas para que se acuerde de hacerlas munira jorgito visitas  © 2017 [address] [facility] Information is for End User's use only and may not be sold, redistributed or otherwise used for commercial purposes  All illustrations and images included in CareNotes® are the copyrighted property of [employer] M [employer] Inc  or Yovany Lowry  Esta información es sólo para uso en educación  Decker intención no es darle un consejo médico sobre enfermedades o tratamientos  Colsulte con decker Venus [employer] farmacéutico antes de seguir cualquier régimen médico para saber si es seguro y efectivo para usted

## 2018-12-21 ENCOUNTER — OFFICE VISIT (OUTPATIENT)
Dept: FAMILY MEDICINE CLINIC | Facility: CLINIC | Age: 73
End: 2018-12-21
Payer: COMMERCIAL

## 2018-12-21 VITALS
HEIGHT: 59 IN | DIASTOLIC BLOOD PRESSURE: 72 MMHG | OXYGEN SATURATION: 98 % | WEIGHT: 213 LBS | BODY MASS INDEX: 42.94 KG/M2 | TEMPERATURE: 98.4 F | HEART RATE: 72 BPM | SYSTOLIC BLOOD PRESSURE: 122 MMHG | RESPIRATION RATE: 16 BRPM

## 2018-12-21 DIAGNOSIS — K21.9 GASTROESOPHAGEAL REFLUX DISEASE WITHOUT ESOPHAGITIS: Primary | ICD-10-CM

## 2018-12-21 PROCEDURE — G0439 PPPS, SUBSEQ VISIT: HCPCS | Performed by: NURSE PRACTITIONER

## 2018-12-21 RX ORDER — OMEPRAZOLE 20 MG/1
20 CAPSULE, DELAYED RELEASE ORAL DAILY
Qty: 30 CAPSULE | Refills: 1 | Status: SHIPPED | OUTPATIENT
Start: 2018-12-21 | End: 2019-02-09 | Stop reason: SDUPTHER

## 2018-12-21 NOTE — PROGRESS NOTES
Assessment and Plan:    Problem List Items Addressed This Visit     None      Visit Diagnoses     Gastroesophageal reflux disease without esophagitis    -  Primary    Relevant Medications    omeprazole (PriLOSEC) 20 mg delayed release capsule        Health Maintenance Due   Topic Date Due    Hepatitis C Screening  1945   Radha Doran Medicare Annual Wellness Visit (AWV)  1945    Diabetic Foot Exam  03/26/1957    DM Eye Exam  03/26/1957    URINE MICROALBUMIN  03/26/1957    DTaP,Tdap,and Td Vaccines (1 - Tdap) 03/26/1968    Pneumococcal PPSV23/PCV13 65+ Years / Low and Medium Risk (2 of 2 - PCV13) 12/01/2015         HPI:  Jorge A Arias is a 72 y o  female here for her Subsequent Wellness Visit      Patient Active Problem List   Diagnosis    Type II or unspecified type diabetes mellitus without mention of complication, not stated as uncontrolled    Hypothyroidism    Bronchitis    Cough    Type 2 diabetes mellitus without complication, with long-term current use of insulin (HCC)    Hypercalcemia     Past Medical History:   Diagnosis Date    Diabetes ([address] )     Hyperlipidemia     Hypertension     Obesity     Thyroid disease      Past Surgical History:   Procedure Laterality Date    CATARACT EXTRACTION      COLONOSCOPY  02/15/2016    small polyp, repeat 5yrs    HYSTERECTOMY       Family History   Problem Relation Age of Onset    Diabetes Father     Hypertension Father      History   Smoking Status    Never Smoker   Smokeless Tobacco    Never Used     History   Alcohol Use No      History   Drug Use No       Current Outpatient Prescriptions   Medication Sig Dispense Refill    ASPIRIN 81 PO Take 81 mg by mouth      benzonatate (TESSALON) 200 MG capsule Take 1 capsule (200 mg total) by mouth 3 (three) times a day as needed for cough 20 capsule 0    diclofenac sodium (VOLTAREN) 1 % Apply 2 g topically 4 (four) times a day 100 g 2    Dulaglutide 1 5 MG/0 5ML SOPN Inject 1 5 mg under the skin      enalapril (VASOTEC) 5 mg tablet Take 5 mg by mouth daily at bedtime  1    Ergocalciferol (VITAMIN D2 PO) Take 50,000 Units by mouth      FARXIGA 10 MG TABS Take 10 mg by mouth every morning  1    glimepiride (AMARYL) 2 mg tablet Take 1 tablet by mouth  1    levothyroxine 50 mcg tablet TAKE ONE TABLET IN THE MORNING (MONDAY TO FRIDAY)  1    levothyroxine 75 mcg tablet Take 75 mcg by mouth daily      lovastatin (MEVACOR) 40 MG tablet Take 40 mg by mouth daily at bedtime  3    metFORMIN (GLUCOPHAGE-XR) 750 mg 24 hr tablet Take 750 mg by mouth 2 (two) times a day  1    omeprazole (PriLOSEC) 20 mg delayed release capsule Take 1 capsule (20 mg total) by mouth daily 30 capsule 1     No current facility-administered medications for this visit  Allergies   Allergen Reactions    No Known Allergies      Immunization History   Administered Date(s) Administered    Influenza 01/10/2013, 10/30/2015, 11/01/2016, 10/02/2017, 11/26/2018    Influenza Split 11/19/2013, 12/18/2014    Influenza Split High Dose Preservative Free IM 10/02/2017    Influenza, high dose seasonal 0 5 mL 11/26/2018    Pneumococcal Polysaccharide PPV23 12/01/2014       Patient Care Team:  Shanel Pereira MD as PCP - General (Family Medicine)    Medicare Screening Tests and Risk Assessments:  Isabel Talley is here for her Subsequent Wellness visit  Last Medicare Wellness visit information reviewed, patient interviewed and updates made to the record today  Health Risk Assessment:  Patient rates overall health as good  Patient feels that their physical health rating is Much better  Eyesight was rated as Same  Hearing was rated as Slightly worse  Patient feels that their emotional and mental health rating is Slightly better  Pain experienced by patient in the last 7 days has been None  Patient states that she has experienced weight loss or gain in last 6 months   (Additional comments: Lost weight )    Emotional/Mental Health:  Patient has been feeling nervous/anxious  PHQ-9 Depression Screening:    Frequency of the following problems over the past two weeks:      1  Little interest or pleasure in doing things: 0 - not at all      2  Feeling down, depressed, or hopeless: 0 - not at all  PHQ-2 Score: 0          Broken Bones/Falls: Fall Risk Assessment:    In the past year, patient has experienced: No history of falling in past year          Bladder/Bowel:  Patient has leaked urine accidently in the last six months  Patient reports loss of bowel control  (Additional Comments: Yes 5 to 6 times )    Immunizations:  Patient has had a flu vaccination within the last year  Patient has not received a pneumonia shot  Patient has not received a shingles shot  Patient has not received tetanus/diphtheria shot  Home Safety:  Patient does not have trouble with stairs inside or outside of their home  Patient currently reports that there are safety hazards present in home , working smoke alarms, working carbon monoxide detectors  Preventative Screenings:   Breast cancer screening performed, no colon cancer screen completed, cholesterol screen completed, glaucoma eye exam completed,     Nutrition:  Current diet: Unhealthy with servings of the following:    Medications:  Patient is not currently taking any over-the-counter supplements  Patient is able to manage medications  Lifestyle Choices:  Patient reports no tobacco use  Patient has not smoked or used tobacco in the past   Patient reports no alcohol use  Patient drives a vehicle  Patient wears seat belt  Current level of exercise of physical activity described by patient as: none          Activities of Daily Living:  Can get out of bed by his or her self, able to dress self, able to make own meals, able to do own shopping, able to bathe self, can do own laundry/housekeeping, can manage own money, pay bills and track expenses    Previous Hospitalizations:  No hospitalization or ED visit in past 12 months        Advanced Directives:  Patient has decided on a power of   Patient has spoken to designated power of   Patient has completed advanced directive  Preventative Screening/Counseling:      Cardiovascular:      General: Risks and Benefits Discussed and Screening Current      Counseling: Healthy Diet, Healthy Weight, Improve Cholesterol, Improve Blood Pressure and Improve Exercise Tolerance          Diabetes:      General: Risks and Benefits Discussed and Screening Current      Counseling: Healthy Diet, Healthy Weight and Improve Physical Activity          Colorectal Cancer:      General: Risks and Benefits Discussed and Screening Current      Counseling: high fiber diet          Breast Cancer:      General: Risks and Benefits Discussed and Screening Current          Cervical Cancer:      General: Screening Not Indicated          Osteoporosis:      General: Risks and Benefits Discussed and Screening Current      Counseling: Regular Weightbearing Exercise          AAA:      General: Screening Not Indicated          Glaucoma:      General: Risks and Benefits Discussed and Screening Current          HIV:      General: Patient Declines          Hepatitis C:      General: Patient Declines        Advanced Directives:   Patient has living will for healthcare, has durable POA for healthcare, patient has an advanced directive  Information on ACP and/or AD not provided  No 5 wishes given  No end of life assessment reviewed with patient           Assessment and Plan:    Problem List Items Addressed This Visit     None      Visit Diagnoses     Gastroesophageal reflux disease without esophagitis    -  Primary    Relevant Medications    omeprazole (PriLOSEC) 20 mg delayed release capsule        Health Maintenance Due   Topic Date Due    Hepatitis C Screening  1945    Medicare Annual Wellness Visit (AWV)  1945    Diabetic Foot Exam  03/26/1957    DM Eye Exam  03/26/1957    URINE MICROALBUMIN  03/26/1957    DTaP,Tdap,and Td Vaccines (1 - Tdap) 03/26/1968    Pneumococcal PPSV23/PCV13 65+ Years / Low and Medium Risk (2 of 2 - PCV13) 12/01/2015         HPI:  Earl Maldonado is a 72 y o  female here for her Subsequent Wellness Visit      Patient Active Problem List   Diagnosis    Type II or unspecified type diabetes mellitus without mention of complication, not stated as uncontrolled    Hypothyroidism    Bronchitis    Cough    Type 2 diabetes mellitus without complication, with long-term current use of insulin ([facility])    Hypercalcemia     Past Medical History:   Diagnosis Date    Diabetes [address] [address]     Hyperlipidemia     Hypertension     Obesity     Thyroid disease      Past Surgical History:   Procedure Laterality Date    CATARACT EXTRACTION      COLONOSCOPY  02/15/2016    small polyp, repeat 5yrs    HYSTERECTOMY       Family History   Problem Relation Age of Onset    Diabetes Father     Hypertension Father      History   Smoking Status    Never Smoker   Smokeless Tobacco    Never Used     History   Alcohol Use No      History   Drug Use No       Current Outpatient Prescriptions   Medication Sig Dispense Refill    ASPIRIN 81 PO Take 81 mg by mouth      benzonatate (TESSALON) 200 MG capsule Take 1 capsule (200 mg total) by mouth 3 (three) times a day as needed for cough 20 capsule 0    diclofenac sodium (VOLTAREN) 1 % Apply 2 g topically 4 (four) times a day 100 g 2    Dulaglutide 1 5 MG/0 5ML SOPN Inject 1 5 mg under the skin      enalapril (VASOTEC) 5 mg tablet Take 5 mg by mouth daily at bedtime  1    Ergocalciferol (VITAMIN D2 PO) Take 50,000 Units by mouth      FARXIGA 10 MG TABS Take 10 mg by mouth every morning  1    glimepiride (AMARYL) 2 mg tablet Take 1 tablet by mouth  1    levothyroxine 50 mcg tablet TAKE ONE TABLET IN THE MORNING (MONDAY TO FRIDAY)  1    levothyroxine 75 mcg tablet Take 75 mcg by mouth daily      lovastatin (MEVACOR) 40 MG tablet Take 40 mg by mouth daily at bedtime  3    metFORMIN (GLUCOPHAGE-XR) 750 mg 24 hr tablet Take 750 mg by mouth 2 (two) times a day  1    omeprazole (PriLOSEC) 20 mg delayed release capsule Take 1 capsule (20 mg total) by mouth daily 30 capsule 1     No current facility-administered medications for this visit        Allergies   Allergen Reactions    No Known Allergies      Immunization History   Administered Date(s) Administered    Influenza 01/10/2013, 10/30/2015, 11/01/2016, 10/02/2017, 11/26/2018    Influenza Split 11/19/2013, 12/18/2014    Influenza Split High Dose Preservative Free IM 10/02/2017    Influenza, high dose seasonal 0 5 mL 11/26/2018    Pneumococcal Polysaccharide PPV23 12/01/2014       Patient Care Team:  Gretel Parekh MD as PCP - General (Family Medicine)    Medicare Screening Tests and Risk Assessments:  Medicare Annual Wellness Visit

## 2019-01-06 ENCOUNTER — HOSPITAL ENCOUNTER (OUTPATIENT)
Dept: MRI IMAGING | Facility: HOSPITAL | Age: 74
Discharge: HOME | End: 2019-01-06
Payer: COMMERCIAL

## 2019-01-06 DIAGNOSIS — R16.0 LIVER MASSES: ICD-10-CM

## 2019-01-06 PROCEDURE — 74183 MRI ABD W/O CNTR FLWD CNTR: CPT

## 2019-01-06 PROCEDURE — A9585 GADOBUTROL INJECTION: HCPCS | Performed by: FAMILY MEDICINE

## 2019-01-06 RX ADMIN — GADOBUTROL 9 ML: 604.72 INJECTION INTRAVENOUS at 10:51

## 2019-02-09 DIAGNOSIS — K21.9 GASTROESOPHAGEAL REFLUX DISEASE WITHOUT ESOPHAGITIS: ICD-10-CM

## 2019-02-11 RX ORDER — OMEPRAZOLE 20 MG/1
20 CAPSULE, DELAYED RELEASE ORAL DAILY
Qty: 30 CAPSULE | Refills: 1 | Status: SHIPPED | OUTPATIENT
Start: 2019-02-11 | End: 2019-03-30 | Stop reason: SDUPTHER

## 2019-02-16 DIAGNOSIS — J40 BRONCHITIS: ICD-10-CM

## 2019-02-16 DIAGNOSIS — K21.9 GASTROESOPHAGEAL REFLUX DISEASE WITHOUT ESOPHAGITIS: ICD-10-CM

## 2019-02-18 RX ORDER — OMEPRAZOLE 20 MG/1
20 CAPSULE, DELAYED RELEASE ORAL DAILY
Qty: 30 CAPSULE | Refills: 1 | OUTPATIENT
Start: 2019-02-18

## 2019-02-18 RX ORDER — AZITHROMYCIN 250 MG/1
TABLET, FILM COATED ORAL
Qty: 6 TABLET | Refills: 0 | OUTPATIENT
Start: 2019-02-18

## 2019-02-18 RX ORDER — BENZONATATE 200 MG/1
200 CAPSULE ORAL 3 TIMES DAILY PRN
Qty: 20 CAPSULE | Refills: 0 | OUTPATIENT
Start: 2019-02-18

## 2019-03-30 DIAGNOSIS — K21.9 GASTROESOPHAGEAL REFLUX DISEASE WITHOUT ESOPHAGITIS: ICD-10-CM

## 2019-04-01 RX ORDER — OMEPRAZOLE 20 MG/1
20 CAPSULE, DELAYED RELEASE ORAL DAILY
Qty: 30 CAPSULE | Refills: 1 | Status: SHIPPED | OUTPATIENT
Start: 2019-04-01 | End: 2019-10-28 | Stop reason: ALTCHOICE

## 2019-04-09 ENCOUNTER — TELEPHONE (OUTPATIENT)
Dept: FAMILY MEDICINE CLINIC | Facility: CLINIC | Age: 74
End: 2019-04-09

## 2019-04-09 DIAGNOSIS — Z12.39 SCREENING BREAST EXAMINATION: Primary | ICD-10-CM

## 2019-04-09 DIAGNOSIS — Z12.39 SCREENING FOR BREAST CANCER: Primary | ICD-10-CM

## 2019-04-25 ENCOUNTER — TRANSCRIBE ORDERS (OUTPATIENT)
Dept: ADMINISTRATIVE | Facility: HOSPITAL | Age: 74
End: 2019-04-25

## 2019-04-25 ENCOUNTER — APPOINTMENT (OUTPATIENT)
Dept: LAB | Facility: IMAGING CENTER | Age: 74
End: 2019-04-25
Payer: COMMERCIAL

## 2019-04-25 ENCOUNTER — HOSPITAL ENCOUNTER (OUTPATIENT)
Dept: RADIOLOGY | Facility: IMAGING CENTER | Age: 74
Discharge: HOME | End: 2019-04-25
Payer: COMMERCIAL

## 2019-04-25 VITALS — HEIGHT: 59 IN | BODY MASS INDEX: 44.96 KG/M2 | WEIGHT: 223 LBS

## 2019-04-25 DIAGNOSIS — I10 ESSENTIAL HYPERTENSION, MALIGNANT: ICD-10-CM

## 2019-04-25 DIAGNOSIS — E78.5 HYPERLIPIDEMIA, UNSPECIFIED HYPERLIPIDEMIA TYPE: ICD-10-CM

## 2019-04-25 DIAGNOSIS — I10 MALIGNANT HYPERTENSION: ICD-10-CM

## 2019-04-25 DIAGNOSIS — E11.649 UNCONTROLLED TYPE 2 DIABETES MELLITUS WITH HYPOGLYCEMIA, UNSPECIFIED HYPOGLYCEMIA COMA STATUS (HCC): ICD-10-CM

## 2019-04-25 DIAGNOSIS — E78.00 PURE HYPERCHOLESTEROLEMIA: ICD-10-CM

## 2019-04-25 DIAGNOSIS — Z12.39 SCREENING FOR BREAST CANCER: ICD-10-CM

## 2019-04-25 DIAGNOSIS — E11.649 UNCONTROLLED TYPE 2 DIABETES MELLITUS WITH HYPOGLYCEMIA, UNSPECIFIED HYPOGLYCEMIA COMA STATUS (HCC): Primary | ICD-10-CM

## 2019-04-25 LAB
ALBUMIN SERPL BCP-MCNC: 3.5 G/DL (ref 3.5–5)
ALP SERPL-CCNC: 112 U/L (ref 46–116)
ALT SERPL W P-5'-P-CCNC: 16 U/L (ref 12–78)
ANION GAP SERPL CALCULATED.3IONS-SCNC: 8 MMOL/L (ref 4–13)
AST SERPL W P-5'-P-CCNC: 10 U/L (ref 5–45)
BASOPHILS # BLD AUTO: 0.05 THOUSANDS/ΜL (ref 0–0.1)
BASOPHILS NFR BLD AUTO: 1 % (ref 0–1)
BILIRUB SERPL-MCNC: 0.79 MG/DL (ref 0.2–1)
BUN SERPL-MCNC: 19 MG/DL (ref 5–25)
CALCIUM SERPL-MCNC: 9.4 MG/DL (ref 8.3–10.1)
CHLORIDE SERPL-SCNC: 107 MMOL/L (ref 100–108)
CO2 SERPL-SCNC: 26 MMOL/L (ref 21–32)
CREAT SERPL-MCNC: 0.91 MG/DL (ref 0.6–1.3)
EOSINOPHIL # BLD AUTO: 0.23 THOUSAND/ΜL (ref 0–0.61)
EOSINOPHIL NFR BLD AUTO: 3 % (ref 0–6)
ERYTHROCYTE [DISTWIDTH] IN BLOOD BY AUTOMATED COUNT: 14.6 % (ref 11.6–15.1)
EST. AVERAGE GLUCOSE BLD GHB EST-MCNC: 163 MG/DL
GFR SERPL CREATININE-BSD FRML MDRD: 63 ML/MIN/1.73SQ M
GLUCOSE SERPL-MCNC: 128 MG/DL (ref 65–140)
HBA1C MFR BLD: 7.3 % (ref 4.2–6.3)
HCT VFR BLD AUTO: 44.7 % (ref 34.8–46.1)
HGB BLD-MCNC: 14.1 G/DL (ref 11.5–15.4)
IMM GRANULOCYTES # BLD AUTO: 0.03 THOUSAND/UL (ref 0–0.2)
IMM GRANULOCYTES NFR BLD AUTO: 0 % (ref 0–2)
LYMPHOCYTES # BLD AUTO: 2.82 THOUSANDS/ΜL (ref 0.6–4.47)
LYMPHOCYTES NFR BLD AUTO: 32 % (ref 14–44)
MCH RBC QN AUTO: 26.9 PG (ref 26.8–34.3)
MCHC RBC AUTO-ENTMCNC: 31.5 G/DL (ref 31.4–37.4)
MCV RBC AUTO: 85 FL (ref 82–98)
MONOCYTES # BLD AUTO: 0.67 THOUSAND/ΜL (ref 0.17–1.22)
MONOCYTES NFR BLD AUTO: 8 % (ref 4–12)
NEUTROPHILS # BLD AUTO: 5.03 THOUSANDS/ΜL (ref 1.85–7.62)
NEUTS SEG NFR BLD AUTO: 56 % (ref 43–75)
NRBC BLD AUTO-RTO: 0 /100 WBCS
PLATELET # BLD AUTO: 359 THOUSANDS/UL (ref 149–390)
PMV BLD AUTO: 11.3 FL (ref 8.9–12.7)
POTASSIUM SERPL-SCNC: 4.2 MMOL/L (ref 3.5–5.3)
PROT SERPL-MCNC: 6.9 G/DL (ref 6.4–8.2)
RBC # BLD AUTO: 5.24 MILLION/UL (ref 3.81–5.12)
SODIUM SERPL-SCNC: 141 MMOL/L (ref 136–145)
TSH SERPL DL<=0.05 MIU/L-ACNC: 2.69 UIU/ML (ref 0.36–3.74)
WBC # BLD AUTO: 8.83 THOUSAND/UL (ref 4.31–10.16)

## 2019-04-25 PROCEDURE — 84443 ASSAY THYROID STIM HORMONE: CPT

## 2019-04-25 PROCEDURE — 36415 COLL VENOUS BLD VENIPUNCTURE: CPT

## 2019-04-25 PROCEDURE — 83036 HEMOGLOBIN GLYCOSYLATED A1C: CPT

## 2019-04-25 PROCEDURE — 80053 COMPREHEN METABOLIC PANEL: CPT

## 2019-04-25 PROCEDURE — 85025 COMPLETE CBC W/AUTO DIFF WBC: CPT

## 2019-04-25 PROCEDURE — 77067 SCR MAMMO BI INCL CAD: CPT

## 2019-05-21 DIAGNOSIS — K21.9 GASTROESOPHAGEAL REFLUX DISEASE WITHOUT ESOPHAGITIS: ICD-10-CM

## 2019-05-23 RX ORDER — EMPAGLIFLOZIN, METFORMIN HYDROCHLORIDE 12.5; 1 MG/1; MG/1
TABLET, EXTENDED RELEASE ORAL
Refills: 2 | COMMUNITY
Start: 2019-04-23 | End: 2019-06-05 | Stop reason: ALTCHOICE

## 2019-05-23 RX ORDER — OMEPRAZOLE 20 MG/1
20 CAPSULE, DELAYED RELEASE ORAL DAILY
Qty: 30 CAPSULE | Refills: 1 | OUTPATIENT
Start: 2019-05-23

## 2019-05-28 ENCOUNTER — OFFICE VISIT (OUTPATIENT)
Dept: OBGYN CLINIC | Facility: CLINIC | Age: 74
End: 2019-05-28
Payer: COMMERCIAL

## 2019-05-28 ENCOUNTER — OFFICE VISIT (OUTPATIENT)
Dept: FAMILY MEDICINE CLINIC | Facility: CLINIC | Age: 74
End: 2019-05-28
Payer: COMMERCIAL

## 2019-05-28 VITALS
SYSTOLIC BLOOD PRESSURE: 130 MMHG | RESPIRATION RATE: 20 BRPM | HEIGHT: 59 IN | TEMPERATURE: 98.1 F | OXYGEN SATURATION: 95 % | DIASTOLIC BLOOD PRESSURE: 70 MMHG | BODY MASS INDEX: 42.46 KG/M2 | HEART RATE: 71 BPM | WEIGHT: 210.6 LBS

## 2019-05-28 VITALS
BODY MASS INDEX: 42.13 KG/M2 | SYSTOLIC BLOOD PRESSURE: 119 MMHG | WEIGHT: 209 LBS | HEART RATE: 67 BPM | DIASTOLIC BLOOD PRESSURE: 68 MMHG | HEIGHT: 59 IN

## 2019-05-28 DIAGNOSIS — M17.11 PRIMARY OSTEOARTHRITIS OF RIGHT KNEE: Primary | ICD-10-CM

## 2019-05-28 DIAGNOSIS — Z86.39 HISTORY OF NON-INSULIN DEPENDENT DIABETES MELLITUS: ICD-10-CM

## 2019-05-28 DIAGNOSIS — R13.19 OTHER DYSPHAGIA: ICD-10-CM

## 2019-05-28 DIAGNOSIS — J06.9 VIRAL UPPER RESPIRATORY TRACT INFECTION: Primary | ICD-10-CM

## 2019-05-28 DIAGNOSIS — R60.0 LOCALIZED EDEMA: ICD-10-CM

## 2019-05-28 DIAGNOSIS — Z11.59 ENCOUNTER FOR HEPATITIS C SCREENING TEST FOR LOW RISK PATIENT: ICD-10-CM

## 2019-05-28 DIAGNOSIS — E11.9 TYPE 2 DIABETES MELLITUS WITHOUT COMPLICATION, WITH LONG-TERM CURRENT USE OF INSULIN (HCC): ICD-10-CM

## 2019-05-28 DIAGNOSIS — Z79.4 TYPE 2 DIABETES MELLITUS WITHOUT COMPLICATION, WITH LONG-TERM CURRENT USE OF INSULIN (HCC): ICD-10-CM

## 2019-05-28 DIAGNOSIS — E78.2 MIXED HYPERLIPIDEMIA: ICD-10-CM

## 2019-05-28 LAB
CREAT UR-MCNC: 61.8 MG/DL
MICROALBUMIN UR-MCNC: <5 MG/L (ref 0–20)
MICROALBUMIN/CREAT 24H UR: <8 MG/G CREATININE (ref 0–30)

## 2019-05-28 PROCEDURE — 99214 OFFICE O/P EST MOD 30 MIN: CPT | Performed by: NURSE PRACTITIONER

## 2019-05-28 PROCEDURE — 99203 OFFICE O/P NEW LOW 30 MIN: CPT | Performed by: ORTHOPAEDIC SURGERY

## 2019-05-28 PROCEDURE — 82043 UR ALBUMIN QUANTITATIVE: CPT | Performed by: NURSE PRACTITIONER

## 2019-05-28 PROCEDURE — 82570 ASSAY OF URINE CREATININE: CPT | Performed by: NURSE PRACTITIONER

## 2019-05-28 RX ORDER — BENZONATATE 200 MG/1
200 CAPSULE ORAL 3 TIMES DAILY PRN
Qty: 20 CAPSULE | Refills: 0 | Status: SHIPPED | OUTPATIENT
Start: 2019-05-28 | End: 2019-11-11 | Stop reason: ALTCHOICE

## 2019-05-28 RX ORDER — FUROSEMIDE 20 MG/1
20 TABLET ORAL DAILY
Qty: 30 TABLET | Refills: 0 | Status: SHIPPED | OUTPATIENT
Start: 2019-05-28 | End: 2019-06-05 | Stop reason: ALTCHOICE

## 2019-05-28 RX ORDER — FLUTICASONE PROPIONATE 50 MCG
2 SPRAY, SUSPENSION (ML) NASAL DAILY
Qty: 16 G | Refills: 0 | Status: SHIPPED | OUTPATIENT
Start: 2019-05-28 | End: 2019-06-22 | Stop reason: SDUPTHER

## 2019-05-30 ENCOUNTER — TRANSCRIBE ORDERS (OUTPATIENT)
Dept: LAB | Facility: HOSPITAL | Age: 74
End: 2019-05-30

## 2019-05-30 ENCOUNTER — APPOINTMENT (OUTPATIENT)
Dept: LAB | Facility: HOSPITAL | Age: 74
End: 2019-05-30
Payer: COMMERCIAL

## 2019-05-30 ENCOUNTER — HOSPITAL ENCOUNTER (OUTPATIENT)
Dept: RADIOLOGY | Facility: HOSPITAL | Age: 74
Discharge: HOME | End: 2019-05-30
Payer: COMMERCIAL

## 2019-05-30 DIAGNOSIS — E78.2 MIXED HYPERLIPIDEMIA: ICD-10-CM

## 2019-05-30 DIAGNOSIS — R13.19 OTHER DYSPHAGIA: ICD-10-CM

## 2019-05-30 DIAGNOSIS — Z11.59 ENCOUNTER FOR HEPATITIS C SCREENING TEST FOR LOW RISK PATIENT: Primary | ICD-10-CM

## 2019-05-30 DIAGNOSIS — Z11.59 ENCOUNTER FOR HEPATITIS C SCREENING TEST FOR LOW RISK PATIENT: ICD-10-CM

## 2019-05-30 LAB
CHOLEST SERPL-MCNC: 155 MG/DL
HDLC SERPL-MCNC: 41 MG/DL (ref 40–59)
LDLC SERPL CALC-MCNC: 89 MG/DL
NONHDLC SERPL-MCNC: 114 MG/DL
TRIGL SERPL-MCNC: 126 MG/DL

## 2019-05-30 PROCEDURE — 80061 LIPID PANEL: CPT

## 2019-05-30 PROCEDURE — 74220 X-RAY XM ESOPHAGUS 1CNTRST: CPT

## 2019-05-30 PROCEDURE — 36415 COLL VENOUS BLD VENIPUNCTURE: CPT

## 2019-06-03 ENCOUNTER — APPOINTMENT (OUTPATIENT)
Dept: LAB | Facility: IMAGING CENTER | Age: 74
End: 2019-06-03
Payer: COMMERCIAL

## 2019-06-03 DIAGNOSIS — Z11.59 ENCOUNTER FOR HEPATITIS C SCREENING TEST FOR LOW RISK PATIENT: ICD-10-CM

## 2019-06-03 DIAGNOSIS — R13.10 DYSPHAGIA, UNSPECIFIED TYPE: Primary | ICD-10-CM

## 2019-06-03 PROCEDURE — 86803 HEPATITIS C AB TEST: CPT

## 2019-06-03 PROCEDURE — 36415 COLL VENOUS BLD VENIPUNCTURE: CPT

## 2019-06-04 LAB — HCV AB SER QL: NORMAL

## 2019-06-05 ENCOUNTER — OFFICE VISIT (OUTPATIENT)
Dept: FAMILY MEDICINE CLINIC | Facility: CLINIC | Age: 74
End: 2019-06-05
Payer: COMMERCIAL

## 2019-06-05 VITALS
BODY MASS INDEX: 42.33 KG/M2 | SYSTOLIC BLOOD PRESSURE: 110 MMHG | OXYGEN SATURATION: 97 % | DIASTOLIC BLOOD PRESSURE: 60 MMHG | HEART RATE: 73 BPM | WEIGHT: 210 LBS | HEIGHT: 59 IN | RESPIRATION RATE: 16 BRPM | TEMPERATURE: 98.2 F

## 2019-06-05 DIAGNOSIS — R60.0 LOCALIZED EDEMA: Primary | ICD-10-CM

## 2019-06-05 DIAGNOSIS — Q39.0 ESOPHAGEAL ATRESIA: ICD-10-CM

## 2019-06-05 DIAGNOSIS — Z79.4 TYPE 2 DIABETES MELLITUS WITHOUT COMPLICATION, WITH LONG-TERM CURRENT USE OF INSULIN (HCC): ICD-10-CM

## 2019-06-05 DIAGNOSIS — I87.2 VENOUS (PERIPHERAL) INSUFFICIENCY: ICD-10-CM

## 2019-06-05 DIAGNOSIS — E11.9 TYPE 2 DIABETES MELLITUS WITHOUT COMPLICATION, WITH LONG-TERM CURRENT USE OF INSULIN (HCC): ICD-10-CM

## 2019-06-05 PROCEDURE — 1160F RVW MEDS BY RX/DR IN RCRD: CPT | Performed by: FAMILY MEDICINE

## 2019-06-05 PROCEDURE — 3008F BODY MASS INDEX DOCD: CPT | Performed by: FAMILY MEDICINE

## 2019-06-05 PROCEDURE — 99214 OFFICE O/P EST MOD 30 MIN: CPT | Performed by: FAMILY MEDICINE

## 2019-06-10 PROBLEM — I87.2 VENOUS (PERIPHERAL) INSUFFICIENCY: Status: ACTIVE | Noted: 2019-06-10

## 2019-06-10 PROBLEM — Q39.0 ESOPHAGEAL ATRESIA: Status: ACTIVE | Noted: 2019-06-10

## 2019-06-22 DIAGNOSIS — J06.9 VIRAL UPPER RESPIRATORY TRACT INFECTION: ICD-10-CM

## 2019-06-24 RX ORDER — FLUTICASONE PROPIONATE 50 MCG
2 SPRAY, SUSPENSION (ML) NASAL DAILY
Qty: 16 G | Refills: 0 | Status: SHIPPED | OUTPATIENT
Start: 2019-06-24 | End: 2019-07-19 | Stop reason: SDUPTHER

## 2019-07-19 DIAGNOSIS — J06.9 VIRAL UPPER RESPIRATORY TRACT INFECTION: ICD-10-CM

## 2019-07-24 RX ORDER — FLUTICASONE PROPIONATE 50 MCG
2 SPRAY, SUSPENSION (ML) NASAL DAILY
Qty: 16 G | Refills: 0 | Status: SHIPPED | OUTPATIENT
Start: 2019-07-24 | End: 2019-08-18 | Stop reason: SDUPTHER

## 2019-08-18 DIAGNOSIS — J06.9 VIRAL UPPER RESPIRATORY TRACT INFECTION: ICD-10-CM

## 2019-08-19 RX ORDER — FLUTICASONE PROPIONATE 50 MCG
SPRAY, SUSPENSION (ML) NASAL
Qty: 16 G | Refills: 0 | Status: SHIPPED | OUTPATIENT
Start: 2019-08-19 | End: 2019-09-13 | Stop reason: SDUPTHER

## 2019-09-12 ENCOUNTER — APPOINTMENT (OUTPATIENT)
Dept: RADIOLOGY | Facility: MEDICAL CENTER | Age: 74
End: 2019-09-12
Payer: COMMERCIAL

## 2019-09-12 ENCOUNTER — OFFICE VISIT (OUTPATIENT)
Dept: OBGYN CLINIC | Facility: MEDICAL CENTER | Age: 74
End: 2019-09-12
Payer: COMMERCIAL

## 2019-09-12 VITALS
SYSTOLIC BLOOD PRESSURE: 133 MMHG | HEIGHT: 59 IN | BODY MASS INDEX: 40.52 KG/M2 | WEIGHT: 201 LBS | HEART RATE: 78 BPM | DIASTOLIC BLOOD PRESSURE: 74 MMHG

## 2019-09-12 DIAGNOSIS — E11.8 TYPE 2 DIABETES MELLITUS WITH COMPLICATION, UNSPECIFIED WHETHER LONG TERM INSULIN USE: ICD-10-CM

## 2019-09-12 DIAGNOSIS — Z01.89 ENCOUNTER FOR LOWER EXTREMITY COMPARISON IMAGING STUDY: ICD-10-CM

## 2019-09-12 DIAGNOSIS — M17.11 PRIMARY OSTEOARTHRITIS OF RIGHT KNEE: Primary | ICD-10-CM

## 2019-09-12 DIAGNOSIS — Z01.818 PREOPERATIVE TESTING: ICD-10-CM

## 2019-09-12 DIAGNOSIS — M17.11 PRIMARY OSTEOARTHRITIS OF RIGHT KNEE: ICD-10-CM

## 2019-09-12 DIAGNOSIS — M25.561 RIGHT KNEE PAIN, UNSPECIFIED CHRONICITY: ICD-10-CM

## 2019-09-12 PROCEDURE — 73560 X-RAY EXAM OF KNEE 1 OR 2: CPT

## 2019-09-12 PROCEDURE — 77073 BONE LENGTH STUDIES: CPT

## 2019-09-12 PROCEDURE — 73564 X-RAY EXAM KNEE 4 OR MORE: CPT

## 2019-09-12 PROCEDURE — 99204 OFFICE O/P NEW MOD 45 MIN: CPT | Performed by: ORTHOPAEDIC SURGERY

## 2019-09-12 RX ORDER — ASCORBIC ACID 500 MG
500 TABLET ORAL DAILY
Qty: 30 TABLET | Refills: 1 | Status: SHIPPED | OUTPATIENT
Start: 2019-09-12 | End: 2019-11-02 | Stop reason: SDUPTHER

## 2019-09-12 RX ORDER — FOLIC ACID 1 MG/1
1 TABLET ORAL DAILY
Qty: 30 TABLET | Refills: 1 | Status: SHIPPED | OUTPATIENT
Start: 2019-09-12 | End: 2019-11-02 | Stop reason: SDUPTHER

## 2019-09-12 RX ORDER — CHLORHEXIDINE GLUCONATE 0.12 MG/ML
15 RINSE ORAL ONCE
Status: CANCELLED | OUTPATIENT
Start: 2019-11-26 | End: 2019-09-12

## 2019-09-12 RX ORDER — SODIUM CHLORIDE 9 MG/ML
75 INJECTION, SOLUTION INTRAVENOUS CONTINUOUS
Status: CANCELLED | OUTPATIENT
Start: 2019-11-26

## 2019-09-12 RX ORDER — CEFAZOLIN SODIUM 2 G/50ML
2000 SOLUTION INTRAVENOUS ONCE
Status: CANCELLED | OUTPATIENT
Start: 2019-11-26 | End: 2019-09-12

## 2019-09-12 RX ORDER — CHLORHEXIDINE GLUCONATE 4 G/100ML
SOLUTION TOPICAL DAILY PRN
Status: CANCELLED | OUTPATIENT
Start: 2019-09-12

## 2019-09-12 RX ORDER — FERROUS SULFATE TAB EC 324 MG (65 MG FE EQUIVALENT) 324 (65 FE) MG
324 TABLET DELAYED RESPONSE ORAL DAILY
Qty: 30 TABLET | Refills: 1 | Status: SHIPPED | OUTPATIENT
Start: 2019-09-12 | End: 2019-11-02 | Stop reason: SDUPTHER

## 2019-09-12 RX ORDER — ACETAMINOPHEN 325 MG/1
975 TABLET ORAL ONCE
Status: CANCELLED | OUTPATIENT
Start: 2019-11-26 | End: 2019-09-12

## 2019-09-12 NOTE — PROGRESS NOTES
Assessment/Plan     1  Primary osteoarthritis of right knee    2  Right knee pain, unspecified chronicity    3  Encounter for lower extremity comparison imaging study    4  Type 2 diabetes mellitus with complication, unspecified whether long term insulin use ([facility] [address] )    5  Preoperative testing      Orders Placed This Encounter   Procedures    Urine culture    XR knee 4+ vw right injury    XR knee 1 or 2 vw left    XR bone length (scanogram)    Comprehensive metabolic panel    Hemoglobin A1C W/EAG Estimation    CBC and differential    C-reactive protein    Protime-INR    APTT    Urinalysis with microscopic    Hemoglobin A1C    Ambulatory referral to [facility] Ambulatory referral to Physical Therapy    Ambulatory referral to Dentistry     José Miguel Barrazabrandee is diagnosed with severe osteoarthritis of the right knee  Treatment options were discussed in detail with patient conservative vs  Surgical intervention for a Right TKA  Patient has failed conservative treatments, she has intolerable pain at times, and feels limited  The risks and benefits of her treatment options were discussed  The risks of surgery include, but are not limited to infection, blood clot, wound healing problems, blood loss, damage to blood vessels and nerves, persistent pain and stiffness, fracture, need for additional surgery, need for revision surgery, failure of hardware, heart attack, stroke, death  The patient understood and agreed to by oral and written consent  I answered all questions regarding surgery  She will be scheduled for surgery, Right TKA  Benefits, risks, and complications were discussed in detail of surgery  She is not [employer] but she requests :  NO BLOOD PRODUCTS  Consent form signed  She will be seen back in the office for her post operative visit    She will be on Lovenox for 4 weeks and then aspirin for 2 weeks for DVT prophylaxis  She will obtain clearance from her PCP and dentist  She will work on weight loss to lb per week until the date of surgery  She understands her surgery will be canceled if her hemoglobin A1c worsens at the time of her blood work  Return for post operative visit       I answered all of the patient's questions during the visit and provided education of the patient's condition during the visit  The patient verbalized understanding of the information given and agrees with the plan  This note was dictated using PPI software  It may contain errors including improperly dictated words  Please contact physician directly for any questions  History of Present Illness   Chief complaint:   Chief Complaint   Patient presents with    Right Knee - Pain       HPI: Sonali Jerry is a 69 y o  female that c/o right knee pain  She has been experience right knee pain x 5 years, pain has increased over the last few years  She describes pain as an ache and is constant  Pain level at worse is 8/10 and at best 1/10  Pain increases with ambulation, walking up and down steps  She experiences start up pain and pain is decreased with rest, elevation and ice  She has taken Aleve for pain control with little to no relief of symptoms  She previously had visco lubricant injections which provided little to no relief of symptoms  She admits locking and clicking, denies popping  She denies any cardiac history  She is not a smoker  She is diabetic and states that her sugar control has been good since her last hemoglobin A1c  She denies any history of blood clots  Her mother had a CVA  She is not a [employer] but does not want any blood products at this time  ROS:    See HPI for musculoskeletal review     All other systems reviewed are negative     Historical Information   Past Medical History:   Diagnosis Date    [facility] [address] [facility]     [facility]    Hyperlipidemia     Hypertension     Obesity     Thyroid disease      Past Surgical History:   Procedure Laterality Date    CATARACT EXTRACTION      COLONOSCOPY  02/15/2016    small polyp, repeat 5yrs    HYSTERECTOMY      age 48    OOPHORECTOMY      both removed age 48     Social History   Social History     Substance and Sexual Activity   Alcohol Use No     Social History     Substance and Sexual Activity   Drug Use No     Social History     Tobacco Use   Smoking Status Never Smoker   Smokeless Tobacco Never Used     Family History:   Family History   Problem Relation Age of Onset   Bere Zuniga Diabetes Father     Hypertension Father     Breast cancer Mother [de-identified]    Breast cancer Sister 46    Endometrial cancer Daughter 52       Current Outpatient Medications on File Prior to Visit   Medication Sig Dispense Refill    ASPIRIN 81 PO Take 81 mg by mouth      benzonatate (TESSALON) 200 MG capsule Take 1 capsule (200 mg total) by mouth 3 (three) times a day as needed for cough 20 capsule 0    diclofenac sodium (VOLTAREN) 1 % Apply 2 g topically 4 (four) times a day 100 g 2    Elastic Bandages & Supports (VENES CUSTOM MEDICAL STOCKINGS) MISC To customize compression stocking with higher pressor in ankle and 1/3 distal of lower legs (30 mmHg) and lower pressor in calves and 1/3 distal thighs (20 mmHg) 1 each 0    enalapril (VASOTEC) 5 mg tablet Take 5 mg by mouth daily at bedtime  1    Ergocalciferol (VITAMIN D2 PO) Take 50,000 Units by mouth      fluticasone (FLONASE) 50 mcg/act nasal spray USE 2 SPRAYS INTO EACH NOSTRIL DAILY 16 g 0    levothyroxine 50 mcg tablet TAKE ONE TABLET IN THE MORNING (MONDAY TO FRIDAY)  1    levothyroxine 75 mcg tablet Take 75 mcg by mouth daily      linaGLIPtin (TRADJENTA) 5 MG TABS Take 5 mg by mouth daily 30 tablet 3    liraglutide (VICTOZA) injection Inject 0 3 mL (1 8 mg total) under the skin daily 3 pen 3    lovastatin (MEVACOR) 40 MG tablet Take 40 mg by mouth daily at bedtime  3    omeprazole (PriLOSEC) 20 mg delayed release capsule Take 1 capsule (20 mg total) by mouth daily 30 capsule 1 No current facility-administered medications on file prior to visit  Allergies   Allergen Reactions    No Known Allergies        Current Outpatient Medications on File Prior to Visit   Medication Sig Dispense Refill    ASPIRIN 81 PO Take 81 mg by mouth      benzonatate (TESSALON) 200 MG capsule Take 1 capsule (200 mg total) by mouth 3 (three) times a day as needed for cough 20 capsule 0    diclofenac sodium (VOLTAREN) 1 % Apply 2 g topically 4 (four) times a day 100 g 2    Elastic Bandages & Supports (VENES CUSTOM MEDICAL STOCKINGS) MISC To customize compression stocking with higher pressor in ankle and 1/3 distal of lower legs (30 mmHg) and lower pressor in calves and 1/3 distal thighs (20 mmHg) 1 each 0    enalapril (VASOTEC) 5 mg tablet Take 5 mg by mouth daily at bedtime  1    Ergocalciferol (VITAMIN D2 PO) Take 50,000 Units by mouth      fluticasone (FLONASE) 50 mcg/act nasal spray USE 2 SPRAYS INTO EACH NOSTRIL DAILY 16 g 0    levothyroxine 50 mcg tablet TAKE ONE TABLET IN THE MORNING (MONDAY TO FRIDAY)  1    levothyroxine 75 mcg tablet Take 75 mcg by mouth daily      linaGLIPtin (TRADJENTA) 5 MG TABS Take 5 mg by mouth daily 30 tablet 3    liraglutide (VICTOZA) injection Inject 0 3 mL (1 8 mg total) under the skin daily 3 pen 3    lovastatin (MEVACOR) 40 MG tablet Take 40 mg by mouth daily at bedtime  3    omeprazole (PriLOSEC) 20 mg delayed release capsule Take 1 capsule (20 mg total) by mouth daily 30 capsule 1     No current facility-administered medications on file prior to visit  Objective   Vitals: Blood pressure 133/74, pulse 78, height 4' 11" (1 499 m), weight 91 2 kg (201 lb), not currently breastfeeding  ,Body mass index is 40 6 kg/m²      PE:  AAOx 3  WDWN  Hearing intact, no drainage from eyes  Regular rate  no audible wheezing  no abdominal distension  LE compartments soft, skin intact    rightknee:    Appearance:  mild swelling   No ecchymosis  no obvious joint deformity   No effusion  Abrasion present posterior lateral knee, no sign of infection  Dependant rubor present bilateral lower extremities with varicosities  Palpation/Tenderness:  +TTP over medial joint line  No TTP over lateral joint line   No TTP over patella  No TTP over patellar tendon  No TTP over pes anserine bursa  Active Range of Motion:  AROM: active 5/110 passive 0/120, crepitus present with rom  Special Tests:  Medial Zainab's Test:  Positive  Lateral Zainab's Test:  Negative  Apley's compression test:  Negative  Lachman's Test:  negative  Anterior Drawer Test:  Negative  Patellar grind:  Negative  Valgus Stress Test:  negative  Varus Stress Test:  negative     No ipsilateral hip pain with ROM    rightLE:    Sensation grossly intact  Palpable pedal pulses  AT/GS/EHL intact    Imaging Studies: I have personally reviewed pertinent films in PACS  bilateral knee: right knee demonstrates severe tricompartmental osteoarthritis, left knee demonstrates moderate osteoarthritis

## 2019-09-13 DIAGNOSIS — J06.9 VIRAL UPPER RESPIRATORY TRACT INFECTION: ICD-10-CM

## 2019-09-13 RX ORDER — FLUTICASONE PROPIONATE 50 MCG
SPRAY, SUSPENSION (ML) NASAL
Qty: 16 G | Refills: 0 | Status: SHIPPED | OUTPATIENT
Start: 2019-09-13 | End: 2019-11-11 | Stop reason: ALTCHOICE

## 2019-09-17 DIAGNOSIS — Z01.818 ENCOUNTER FOR PREADMISSION TESTING: Primary | ICD-10-CM

## 2019-09-22 ENCOUNTER — ANESTHESIA EVENT (OUTPATIENT)
Dept: PERIOP | Facility: HOSPITAL | Age: 74
DRG: 470 | End: 2019-09-22
Payer: COMMERCIAL

## 2019-09-26 ENCOUNTER — TELEPHONE (OUTPATIENT)
Dept: OBGYN CLINIC | Facility: MEDICAL CENTER | Age: 74
End: 2019-09-26

## 2019-09-26 NOTE — TELEPHONE ENCOUNTER
Dr Valente   #: [phone]         Saleem Dash from [facility]'s office called in requesting a clearance form  Please fax over to #[phone]   Thank you

## 2019-09-26 NOTE — TELEPHONE ENCOUNTER
Is area clearance form that [address] [place] [address] normally has them fill out    If so please send it

## 2019-09-27 ENCOUNTER — TELEPHONE (OUTPATIENT)
Dept: OBGYN CLINIC | Facility: HOSPITAL | Age: 74
End: 2019-09-27

## 2019-10-28 ENCOUNTER — APPOINTMENT (OUTPATIENT)
Dept: PREADMISSION TESTING | Facility: HOSPITAL | Age: 74
End: 2019-10-28
Payer: COMMERCIAL

## 2019-10-28 ENCOUNTER — APPOINTMENT (OUTPATIENT)
Dept: LAB | Facility: HOSPITAL | Age: 74
End: 2019-10-28
Attending: ORTHOPAEDIC SURGERY
Payer: COMMERCIAL

## 2019-10-28 DIAGNOSIS — M25.561 RIGHT KNEE PAIN, UNSPECIFIED CHRONICITY: ICD-10-CM

## 2019-10-28 DIAGNOSIS — Z01.818 PREOPERATIVE TESTING: ICD-10-CM

## 2019-10-28 DIAGNOSIS — Z01.818 ENCOUNTER FOR PREADMISSION TESTING: ICD-10-CM

## 2019-10-28 DIAGNOSIS — E11.8 TYPE 2 DIABETES MELLITUS WITH COMPLICATION (HCC): ICD-10-CM

## 2019-10-28 DIAGNOSIS — M17.11 PRIMARY OSTEOARTHRITIS OF RIGHT KNEE: ICD-10-CM

## 2019-10-28 LAB
ALBUMIN SERPL BCP-MCNC: 3.5 G/DL (ref 3.5–5)
ALP SERPL-CCNC: 107 U/L (ref 46–116)
ALT SERPL W P-5'-P-CCNC: 15 U/L (ref 12–78)
ANION GAP SERPL CALCULATED.3IONS-SCNC: 4 MMOL/L (ref 4–13)
APTT PPP: 27 SECONDS (ref 23–37)
AST SERPL W P-5'-P-CCNC: 13 U/L (ref 5–45)
ATRIAL RATE: 65 BPM
BACTERIA UR QL AUTO: ABNORMAL /HPF
BASOPHILS # BLD AUTO: 0.04 THOUSANDS/ΜL (ref 0–0.1)
BASOPHILS NFR BLD AUTO: 0 % (ref 0–1)
BILIRUB SERPL-MCNC: 0.71 MG/DL (ref 0.2–1)
BILIRUB UR QL STRIP: NEGATIVE
BUN SERPL-MCNC: 20 MG/DL (ref 5–25)
CALCIUM SERPL-MCNC: 9.5 MG/DL (ref 8.3–10.1)
CHLORIDE SERPL-SCNC: 106 MMOL/L (ref 100–108)
CLARITY UR: ABNORMAL
CO2 SERPL-SCNC: 30 MMOL/L (ref 21–32)
COLOR UR: YELLOW
CREAT SERPL-MCNC: 0.86 MG/DL (ref 0.6–1.3)
CRP SERPL QL: 4.1 MG/L
EOSINOPHIL # BLD AUTO: 0.14 THOUSAND/ΜL (ref 0–0.61)
EOSINOPHIL NFR BLD AUTO: 2 % (ref 0–6)
ERYTHROCYTE [DISTWIDTH] IN BLOOD BY AUTOMATED COUNT: 14.9 % (ref 11.6–15.1)
EST. AVERAGE GLUCOSE BLD GHB EST-MCNC: 154 MG/DL
FERRITIN SERPL-MCNC: 27 NG/ML (ref 8–388)
GFR SERPL CREATININE-BSD FRML MDRD: 68 ML/MIN/1.73SQ M
GLUCOSE P FAST SERPL-MCNC: 99 MG/DL (ref 65–99)
GLUCOSE UR STRIP-MCNC: ABNORMAL MG/DL
HBA1C MFR BLD: 7 % (ref 4.2–6.3)
HCT VFR BLD AUTO: 45.1 % (ref 34.8–46.1)
HGB BLD-MCNC: 14.2 G/DL (ref 11.5–15.4)
HGB UR QL STRIP.AUTO: NEGATIVE
IMM GRANULOCYTES # BLD AUTO: 0.04 THOUSAND/UL (ref 0–0.2)
IMM GRANULOCYTES NFR BLD AUTO: 0 % (ref 0–2)
INR PPP: 0.92 (ref 0.84–1.19)
IRON SATN MFR SERPL: 14 %
IRON SERPL-MCNC: 77 UG/DL (ref 50–170)
KETONES UR STRIP-MCNC: NEGATIVE MG/DL
LEUKOCYTE ESTERASE UR QL STRIP: ABNORMAL
LYMPHOCYTES # BLD AUTO: 2.49 THOUSANDS/ΜL (ref 0.6–4.47)
LYMPHOCYTES NFR BLD AUTO: 27 % (ref 14–44)
MCH RBC QN AUTO: 27.3 PG (ref 26.8–34.3)
MCHC RBC AUTO-ENTMCNC: 31.5 G/DL (ref 31.4–37.4)
MCV RBC AUTO: 87 FL (ref 82–98)
MONOCYTES # BLD AUTO: 0.74 THOUSAND/ΜL (ref 0.17–1.22)
MONOCYTES NFR BLD AUTO: 8 % (ref 4–12)
NEUTROPHILS # BLD AUTO: 5.74 THOUSANDS/ΜL (ref 1.85–7.62)
NEUTS SEG NFR BLD AUTO: 63 % (ref 43–75)
NITRITE UR QL STRIP: NEGATIVE
NON-SQ EPI CELLS URNS QL MICRO: ABNORMAL /HPF
NRBC BLD AUTO-RTO: 0 /100 WBCS
P AXIS: 74 DEGREES
PH UR STRIP.AUTO: 6.5 [PH]
PLATELET # BLD AUTO: 296 THOUSANDS/UL (ref 149–390)
PMV BLD AUTO: 10.7 FL (ref 8.9–12.7)
POTASSIUM SERPL-SCNC: 3.9 MMOL/L (ref 3.5–5.3)
PR INTERVAL: 184 MS
PROT SERPL-MCNC: 6.8 G/DL (ref 6.4–8.2)
PROT UR STRIP-MCNC: NEGATIVE MG/DL
PROTHROMBIN TIME: 12.5 SECONDS (ref 11.6–14.5)
QRS AXIS: 72 DEGREES
QRSD INTERVAL: 76 MS
QT INTERVAL: 396 MS
QTC INTERVAL: 411 MS
RBC # BLD AUTO: 5.2 MILLION/UL (ref 3.81–5.12)
RBC #/AREA URNS AUTO: ABNORMAL /HPF
SODIUM SERPL-SCNC: 140 MMOL/L (ref 136–145)
SP GR UR STRIP.AUTO: 1.01 (ref 1–1.03)
T WAVE AXIS: 27 DEGREES
TIBC SERPL-MCNC: 537 UG/DL (ref 250–450)
UROBILINOGEN UR QL STRIP.AUTO: 0.2 E.U./DL
VENTRICULAR RATE: 65 BPM
WBC # BLD AUTO: 9.19 THOUSAND/UL (ref 4.31–10.16)
WBC #/AREA URNS AUTO: ABNORMAL /HPF

## 2019-10-28 PROCEDURE — 93010 ELECTROCARDIOGRAM REPORT: CPT

## 2019-10-28 PROCEDURE — 87086 URINE CULTURE/COLONY COUNT: CPT

## 2019-10-28 PROCEDURE — 81001 URINALYSIS AUTO W/SCOPE: CPT | Performed by: ORTHOPAEDIC SURGERY

## 2019-10-28 PROCEDURE — 80053 COMPREHEN METABOLIC PANEL: CPT

## 2019-10-28 PROCEDURE — 86140 C-REACTIVE PROTEIN: CPT

## 2019-10-28 PROCEDURE — 83550 IRON BINDING TEST: CPT

## 2019-10-28 PROCEDURE — 85610 PROTHROMBIN TIME: CPT

## 2019-10-28 PROCEDURE — 85730 THROMBOPLASTIN TIME PARTIAL: CPT

## 2019-10-28 PROCEDURE — 85025 COMPLETE CBC W/AUTO DIFF WBC: CPT

## 2019-10-28 PROCEDURE — 93005 ELECTROCARDIOGRAM TRACING: CPT

## 2019-10-28 PROCEDURE — 36415 COLL VENOUS BLD VENIPUNCTURE: CPT

## 2019-10-28 PROCEDURE — 83036 HEMOGLOBIN GLYCOSYLATED A1C: CPT

## 2019-10-28 PROCEDURE — 82728 ASSAY OF FERRITIN: CPT

## 2019-10-28 PROCEDURE — 83540 ASSAY OF IRON: CPT

## 2019-10-28 RX ORDER — ENALAPRIL MALEATE 10 MG/1
10 TABLET ORAL
COMMUNITY
End: 2019-12-09 | Stop reason: ALTCHOICE

## 2019-10-28 RX ORDER — OMEPRAZOLE 20 MG/1
20 CAPSULE, DELAYED RELEASE ORAL DAILY
COMMUNITY
End: 2020-01-22 | Stop reason: SDUPTHER

## 2019-10-28 RX ORDER — GLIMEPIRIDE 2 MG/1
2 TABLET ORAL
COMMUNITY
End: 2019-11-11 | Stop reason: ALTCHOICE

## 2019-10-28 NOTE — PRE-PROCEDURE INSTRUCTIONS
Pre-Surgery Instructions:   Medication Instructions    ascorbic acid (VITAMIN C) 500 mg tablet Patient was instructed by Physician and understands   ASPIRIN 81 PO Patient was instructed by Physician and understands   benzonatate (TESSALON) 200 MG capsule Patient was instructed by Physician and understands   Dapagliflozin Propanediol (FARXIGA) 10 MG TABS Patient was instructed by Physician and understands   diclofenac sodium (VOLTAREN) 1 % Patient was instructed by Physician and understands   enalapril (VASOTEC) 10 mg tablet Patient was instructed by Physician and understands   Ergocalciferol (VITAMIN D2 PO) Patient was instructed by Physician and understands   fluticasone (FLONASE) 50 mcg/act nasal spray Patient was instructed by Physician and understands   glimepiride (AMARYL) 2 mg tablet Patient was instructed by Physician and understands   JARDIANCE 25 MG TABS Patient was instructed by Physician and understands   levothyroxine 50 mcg tablet Patient was instructed by Physician and understands   levothyroxine 75 mcg tablet Patient was instructed by Physician and understands   lovastatin (MEVACOR) 40 MG tablet Patient was instructed by Physician and understands   omeprazole (PriLOSEC) 20 mg delayed release capsule Patient was instructed by Physician and understands   OZEMPIC, 1 MG/DOSE, 2 MG/1 5ML SOPN Patient was instructed by Physician and understands   patient supplied medication Patient was instructed by Physician and understands  Pt instructed to take levothyroxine the morning of surgery with a small sip of water  [facility] preop instructions given to pt and reviewed  Pt given Chlorhexidine and Chlorhexidine wipes  Pt given incentive spirometer with instruction  Joint information reviewed with pt

## 2019-10-28 NOTE — ANESTHESIA PREPROCEDURE EVALUATION
Review of Systems/Medical History  Patient summary reviewed  Chart reviewed  No history of anesthetic complications     Cardiovascular  Hyperlipidemia, Hypertension controlled,    Pulmonary  Negative pulmonary ROS        GI/Hepatic    GERD well controlled, Esophageal disease esophageal atresia,        Negative  ROS        Endo/Other  Negative endo/other ROS Diabetes well controlled type 2 Oral agent, History of thyroid disease , hypothyroidism,   Obesity  morbid obesity   GYN    Uterine cancer (endometrial CA Hx),        Hematology  Negative hematology ROS      Musculoskeletal    Arthritis     Neurology  Negative neurology ROS      Psychology   Negative psychology ROS              Physical Exam    Airway    Mallampati score: II  TM Distance: >3 FB  Neck ROM: full     Dental   No notable dental hx     Cardiovascular  Rhythm: regular, Rate: normal, Cardiovascular exam normal    Pulmonary  Pulmonary exam normal Breath sounds clear to auscultation,     Other Findings        Anesthesia Plan  ASA Score- 3     Anesthesia Type- spinal with ASA Monitors  Additional Monitors:   Airway Plan:         Plan Factors-Patient not instructed to abstain from smoking on day of procedure  Patient did not smoke on day of surgery  Induction- intravenous  Postoperative Plan-     Informed Consent- Anesthetic plan and risks discussed with patient

## 2019-10-29 LAB — BACTERIA UR CULT: NORMAL

## 2019-11-02 DIAGNOSIS — M17.11 PRIMARY OSTEOARTHRITIS OF RIGHT KNEE: ICD-10-CM

## 2019-11-04 RX ORDER — FERROUS SULFATE 325(65) MG
TABLET ORAL
Qty: 30 TABLET | Refills: 1 | Status: SHIPPED | OUTPATIENT
Start: 2019-11-04 | End: 2020-01-28

## 2019-11-04 RX ORDER — FOLIC ACID 1 MG/1
1 TABLET ORAL DAILY
Qty: 30 TABLET | Refills: 1 | Status: SHIPPED | OUTPATIENT
Start: 2019-11-04 | End: 2020-01-28

## 2019-11-04 RX ORDER — ASCORBIC ACID 500 MG
500 TABLET ORAL DAILY
Qty: 30 TABLET | Refills: 1 | Status: SHIPPED | OUTPATIENT
Start: 2019-11-04 | End: 2021-08-04 | Stop reason: ALTCHOICE

## 2019-11-06 ENCOUNTER — TELEPHONE (OUTPATIENT)
Dept: OBGYN CLINIC | Facility: HOSPITAL | Age: 74
End: 2019-11-06

## 2019-11-06 ENCOUNTER — APPOINTMENT (OUTPATIENT)
Dept: LAB | Facility: IMAGING CENTER | Age: 74
End: 2019-11-06
Payer: COMMERCIAL

## 2019-11-06 ENCOUNTER — TRANSCRIBE ORDERS (OUTPATIENT)
Dept: ADMINISTRATIVE | Facility: HOSPITAL | Age: 74
End: 2019-11-06

## 2019-11-06 DIAGNOSIS — E11.65 UNCONTROLLED TYPE 2 DIABETES MELLITUS WITH HYPERGLYCEMIA (HCC): ICD-10-CM

## 2019-11-06 DIAGNOSIS — E55.9 AVITAMINOSIS D: ICD-10-CM

## 2019-11-06 DIAGNOSIS — I10 ESSENTIAL HYPERTENSION: ICD-10-CM

## 2019-11-06 DIAGNOSIS — E11.65 UNCONTROLLED TYPE 2 DIABETES MELLITUS WITH HYPERGLYCEMIA (HCC): Primary | ICD-10-CM

## 2019-11-06 DIAGNOSIS — I51.9 MYXEDEMA HEART DISEASE: ICD-10-CM

## 2019-11-06 DIAGNOSIS — E78.00 PURE HYPERCHOLESTEROLEMIA: ICD-10-CM

## 2019-11-06 DIAGNOSIS — E66.01 MORBID OBESITY (HCC): ICD-10-CM

## 2019-11-06 DIAGNOSIS — E11.9 TYPE 2 DIABETES MELLITUS WITHOUT COMPLICATION, WITH LONG-TERM CURRENT USE OF INSULIN (HCC): ICD-10-CM

## 2019-11-06 DIAGNOSIS — E03.9 MYXEDEMA HEART DISEASE: ICD-10-CM

## 2019-11-06 DIAGNOSIS — Z79.4 TYPE 2 DIABETES MELLITUS WITHOUT COMPLICATION, WITH LONG-TERM CURRENT USE OF INSULIN (HCC): ICD-10-CM

## 2019-11-06 LAB — 25(OH)D3 SERPL-MCNC: 101.7 NG/ML (ref 30–100)

## 2019-11-06 PROCEDURE — 82306 VITAMIN D 25 HYDROXY: CPT

## 2019-11-06 PROCEDURE — 80053 COMPREHEN METABOLIC PANEL: CPT

## 2019-11-06 PROCEDURE — 36415 COLL VENOUS BLD VENIPUNCTURE: CPT

## 2019-11-06 PROCEDURE — 83036 HEMOGLOBIN GLYCOSYLATED A1C: CPT

## 2019-11-06 PROCEDURE — 80061 LIPID PANEL: CPT

## 2019-11-07 LAB
ALBUMIN SERPL BCP-MCNC: 3.6 G/DL (ref 3.5–5)
ALP SERPL-CCNC: 111 U/L (ref 46–116)
ALT SERPL W P-5'-P-CCNC: 14 U/L (ref 12–78)
ANION GAP SERPL CALCULATED.3IONS-SCNC: 7 MMOL/L (ref 4–13)
AST SERPL W P-5'-P-CCNC: 15 U/L (ref 5–45)
BILIRUB SERPL-MCNC: 0.77 MG/DL (ref 0.2–1)
BUN SERPL-MCNC: 17 MG/DL (ref 5–25)
CALCIUM SERPL-MCNC: 9.7 MG/DL (ref 8.3–10.1)
CHLORIDE SERPL-SCNC: 106 MMOL/L (ref 100–108)
CHOLEST SERPL-MCNC: 123 MG/DL (ref 50–200)
CO2 SERPL-SCNC: 27 MMOL/L (ref 21–32)
CREAT SERPL-MCNC: 0.84 MG/DL (ref 0.6–1.3)
EST. AVERAGE GLUCOSE BLD GHB EST-MCNC: 154 MG/DL
GFR SERPL CREATININE-BSD FRML MDRD: 70 ML/MIN/1.73SQ M
GLUCOSE P FAST SERPL-MCNC: 89 MG/DL (ref 65–99)
HBA1C MFR BLD: 7 % (ref 4.2–6.3)
HDLC SERPL-MCNC: 51 MG/DL
LDLC SERPL CALC-MCNC: 54 MG/DL (ref 0–100)
NONHDLC SERPL-MCNC: 72 MG/DL
POTASSIUM SERPL-SCNC: 4.1 MMOL/L (ref 3.5–5.3)
PROT SERPL-MCNC: 6.9 G/DL (ref 6.4–8.2)
SODIUM SERPL-SCNC: 140 MMOL/L (ref 136–145)
TRIGL SERPL-MCNC: 89 MG/DL

## 2019-11-11 ENCOUNTER — CONSULT (OUTPATIENT)
Dept: FAMILY MEDICINE CLINIC | Facility: CLINIC | Age: 74
End: 2019-11-11
Payer: COMMERCIAL

## 2019-11-11 VITALS
DIASTOLIC BLOOD PRESSURE: 70 MMHG | BODY MASS INDEX: 39.92 KG/M2 | SYSTOLIC BLOOD PRESSURE: 126 MMHG | HEIGHT: 59 IN | HEART RATE: 72 BPM | TEMPERATURE: 98.2 F | OXYGEN SATURATION: 97 % | WEIGHT: 198 LBS | RESPIRATION RATE: 16 BRPM

## 2019-11-11 DIAGNOSIS — N76.0 VAGINITIS AND VULVOVAGINITIS: ICD-10-CM

## 2019-11-11 DIAGNOSIS — Z23 NEEDS FLU SHOT: ICD-10-CM

## 2019-11-11 DIAGNOSIS — E11.9 TYPE 2 DIABETES MELLITUS WITHOUT COMPLICATION, WITH LONG-TERM CURRENT USE OF INSULIN (HCC): ICD-10-CM

## 2019-11-11 DIAGNOSIS — M25.551 PAIN OF RIGHT HIP JOINT: ICD-10-CM

## 2019-11-11 DIAGNOSIS — Z79.4 TYPE 2 DIABETES MELLITUS WITHOUT COMPLICATION, WITH LONG-TERM CURRENT USE OF INSULIN (HCC): ICD-10-CM

## 2019-11-11 DIAGNOSIS — Z01.818 PREOPERATIVE CLEARANCE: Primary | ICD-10-CM

## 2019-11-11 DIAGNOSIS — E03.9 HYPOTHYROIDISM (ACQUIRED): ICD-10-CM

## 2019-11-11 DIAGNOSIS — Z78.0 MENOPAUSE PRESENT: ICD-10-CM

## 2019-11-11 PROCEDURE — 90662 IIV NO PRSV INCREASED AG IM: CPT | Performed by: FAMILY MEDICINE

## 2019-11-11 PROCEDURE — 99214 OFFICE O/P EST MOD 30 MIN: CPT | Performed by: FAMILY MEDICINE

## 2019-11-11 PROCEDURE — G0008 ADMIN INFLUENZA VIRUS VAC: HCPCS | Performed by: FAMILY MEDICINE

## 2019-11-11 RX ORDER — NAPROXEN 375 MG/1
375 TABLET, DELAYED RELEASE ORAL 2 TIMES DAILY WITH MEALS
Qty: 60 EACH | Refills: 0 | Status: SHIPPED | OUTPATIENT
Start: 2019-11-11 | End: 2019-11-11 | Stop reason: ALTCHOICE

## 2019-11-11 RX ORDER — GLIMEPIRIDE 2 MG/1
2 TABLET ORAL
Qty: 30 TABLET | Refills: 5 | Status: SHIPPED | OUTPATIENT
Start: 2019-11-11 | End: 2021-12-09 | Stop reason: ALTCHOICE

## 2019-11-11 RX ORDER — SENNOSIDES 8.6 MG
650 CAPSULE ORAL EVERY 8 HOURS PRN
Qty: 90 TABLET | Refills: 0 | Status: SHIPPED | OUTPATIENT
Start: 2019-11-11 | End: 2021-08-04 | Stop reason: ALTCHOICE

## 2019-11-11 RX ORDER — FLUCONAZOLE 150 MG/1
TABLET ORAL
Qty: 2 TABLET | Refills: 0 | Status: SHIPPED | OUTPATIENT
Start: 2019-11-11 | End: 2019-11-18

## 2019-11-11 NOTE — H&P (VIEW-ONLY)
Assessment/Plan:  1  Preoperative clearance  Patient has multiple condition that puts her in a higher risk during surgery and recovery  After assessing patient and  reviewing her records and current labs/EKG   I found her medically cleared for surgery  In this visit patient has complains of vaginal itching that is recurrent related to [facility]  She will follow up with Endocrinologist for a substitute  Diabetes is a goal witht HbA1C of 7 0  I encoraged patient to stop any OTC or herbal treatmeents, Avoid the use of any NSAID's  List of NSAID's were given to patient  She may take tylenol during week previous to [facility]  She will follow up with me after Surgery for other chronic issues  No problem-specific Assessment & Plan notes found for this encounter  Diagnoses and all orders for this visit:    Needs flu shot  -     influenza vaccine, 8915-2526, high-dose, PF 0 5 mL (FLUZONE HIGH-DOSE)    Pain of right hip joint  -     Discontinue: naproxen (EC NAPROSYN) 375 MG TBEC; Take 1 tablet (375 mg total) by mouth 2 (two) times a day with meals  -     acetaminophen (TYLENOL) 650 mg CR tablet; Take 1 tablet (650 mg total) by mouth every 8 (eight) hours as needed for mild pain    Menopause present  -     DXA bone density spine hip and pelvis; Future    Hypothyroidism (acquired)  -     TSH, 3rd generation; Future    Vaginitis and vulvovaginitis  -     fluconazole (DIFLUCAN) 150 mg tablet; Take one tablet today and repeat one tablet in one week    Type 2 diabetes mellitus without complication, with long-term current use of insulin (HCC)  -     glimepiride (AMARYL) 2 mg tablet; Take 1 tablet (2 mg total) by mouth daily with dinner          Subjective:      Patient ID: Christian Reyes is a 69 y o  female  Patient is here for RTK replacement Preoperative evaluation  She suffers but no limited of Severe obesity worsening OA of Knees, Diabetes well controlled and Hypothyroidism    She denies any distress       The following portions of the patient's history were reviewed and updated as appropriate: allergies, current medications, past family history, past medical history, past social history, past surgical history and problem list     Review of Systems   Constitutional: Negative for diaphoresis, fatigue, fever and unexpected weight change  Respiratory: Negative for cough, chest tightness, shortness of breath and wheezing  Cardiovascular: Negative for chest pain, palpitations and leg swelling  Gastrointestinal: Negative for abdominal pain, blood in stool, nausea and vomiting  Musculoskeletal: Positive for arthralgias  Neurological: Negative for dizziness, syncope, light-headedness and headaches  Hematological: Does not bruise/bleed easily  Psychiatric/Behavioral: Negative for behavioral problems, self-injury and sleep disturbance  The patient is not nervous/anxious  Objective:      /70 (BP Location: Left arm, Patient Position: Sitting, Cuff Size: Standard)   Pulse 72   Temp 98 2 °F (36 8 °C) (Oral)   Resp 16   Ht 4' 11" (1 499 m)   Wt 89 8 kg (198 lb)   SpO2 97%   BMI 39 99 kg/m²          Physical Exam   Constitutional:   Body mass index is 39 99 kg/m²  HENT:   Nose: Nose normal    Mouth/Throat: Oropharynx is clear and moist  No oropharyngeal exudate  Eyes: Pupils are equal, round, and reactive to light  EOM are normal  Right eye exhibits no discharge  Left eye exhibits no discharge  No scleral icterus  Cardiovascular: Normal rate, regular rhythm, normal heart sounds and intact distal pulses  Exam reveals no friction rub  No murmur heard  Pulmonary/Chest: Effort normal  No respiratory distress  She has no wheezes  She has no rales  She exhibits no tenderness  Musculoskeletal: Normal range of motion  She exhibits tenderness (of right knee, gait problems using a cane  )  Neurological: She is alert  Skin: Skin is warm and dry  No rash noted  No erythema  Psychiatric: She has a normal mood and affect  Her behavior is normal    Nursing note and vitals reviewed

## 2019-11-11 NOTE — PROGRESS NOTES
Assessment/Plan:  1  Preoperative clearance  Patient has multiple condition that puts her in a higher risk during surgery and recovery  After assessing patient and  reviewing her records and current labs/EKG   I found her medically cleared for surgery  In this visit patient has complains of vaginal itching that is recurrent related to [facility] treatment  She will follow up with Endocrinologist for a substitute  Diabetes is a goal witht HbA1C of 7 0  I encoraged patient to stop any OTC or herbal treatmeents, Avoid the use of any NSAID's  List of NSAID's were given to patient  She may take tylenol during week previous to [facility]  She will follow up with me after Surgery for other chronic issues  No problem-specific Assessment & Plan notes found for this encounter  Diagnoses and all orders for this visit:    Needs flu shot  -     influenza vaccine, 4870-6858, high-dose, PF 0 5 mL (FLUZONE HIGH-DOSE)    Pain of right hip joint  -     Discontinue: naproxen (EC NAPROSYN) 375 MG TBEC; Take 1 tablet (375 mg total) by mouth 2 (two) times a day with meals  -     acetaminophen (TYLENOL) 650 mg CR tablet; Take 1 tablet (650 mg total) by mouth every 8 (eight) hours as needed for mild pain    Menopause present  -     DXA bone density spine hip and pelvis; Future    Hypothyroidism (acquired)  -     TSH, 3rd generation; Future    Vaginitis and vulvovaginitis  -     fluconazole (DIFLUCAN) 150 mg tablet; Take one tablet today and repeat one tablet in one week    Type 2 diabetes mellitus without complication, with long-term current use of insulin (HCC)  -     glimepiride (AMARYL) 2 mg tablet; Take 1 tablet (2 mg total) by mouth daily with dinner          Subjective:      Patient ID: Moises Torres is a 69 y o  female  Patient is here for RTK replacement Preoperative evaluation  She suffers but no limited of Severe obesity worsening OA of Knees, Diabetes well controlled and Hypothyroidism    She denies any distress       The following portions of the patient's history were reviewed and updated as appropriate: allergies, current medications, past family history, past medical history, past social history, past surgical history and problem list     Review of Systems   Constitutional: Negative for diaphoresis, fatigue, fever and unexpected weight change  Respiratory: Negative for cough, chest tightness, shortness of breath and wheezing  Cardiovascular: Negative for chest pain, palpitations and leg swelling  Gastrointestinal: Negative for abdominal pain, blood in stool, nausea and vomiting  Musculoskeletal: Positive for arthralgias  Neurological: Negative for dizziness, syncope, light-headedness and headaches  Hematological: Does not bruise/bleed easily  Psychiatric/Behavioral: Negative for behavioral problems, self-injury and sleep disturbance  The patient is not nervous/anxious  Objective:      /70 (BP Location: Left arm, Patient Position: Sitting, Cuff Size: Standard)   Pulse 72   Temp 98 2 °F (36 8 °C) (Oral)   Resp 16   Ht 4' 11" (1 499 m)   Wt 89 8 kg (198 lb)   SpO2 97%   BMI 39 99 kg/m²          Physical Exam   Constitutional:   Body mass index is 39 99 kg/m²  HENT:   Nose: Nose normal    Mouth/Throat: Oropharynx is clear and moist  No oropharyngeal exudate  Eyes: Pupils are equal, round, and reactive to light  EOM are normal  Right eye exhibits no discharge  Left eye exhibits no discharge  No scleral icterus  Cardiovascular: Normal rate, regular rhythm, normal heart sounds and intact distal pulses  Exam reveals no friction rub  No murmur heard  Pulmonary/Chest: Effort normal  No respiratory distress  She has no wheezes  She has no rales  She exhibits no tenderness  Musculoskeletal: Normal range of motion  She exhibits tenderness (of right knee, gait problems using a cane  )  Neurological: She is alert  Skin: Skin is warm and dry  No rash noted  No erythema  Psychiatric: She has a normal mood and affect  Her behavior is normal    Nursing note and vitals reviewed

## 2019-11-12 ENCOUNTER — TELEPHONE (OUTPATIENT)
Dept: OBGYN CLINIC | Facility: MEDICAL CENTER | Age: 74
End: 2019-11-12

## 2019-11-13 ENCOUNTER — TELEPHONE (OUTPATIENT)
Dept: OBGYN CLINIC | Facility: HOSPITAL | Age: 74
End: 2019-11-13

## 2019-11-13 ENCOUNTER — TELEPHONE (OUTPATIENT)
Dept: OBGYN CLINIC | Facility: MEDICAL CENTER | Age: 74
End: 2019-11-13

## 2019-11-13 NOTE — TELEPHONE ENCOUNTER
LM on patient's phone I have not received her dental form  Unable to complete call to the dentist's office-no answer  Asked patient to obtain either a note or the form from them  Dentist stated it was faxed to us, but not received it

## 2019-11-14 ENCOUNTER — TELEPHONE (OUTPATIENT)
Dept: OBGYN CLINIC | Facility: MEDICAL CENTER | Age: 74
End: 2019-11-14

## 2019-11-14 NOTE — TELEPHONE ENCOUNTER
LM asking patient to call for her postop appointment for 12/2  Did not have one made with change of surgery date

## 2019-11-17 PROBLEM — Z79.4 TYPE 2 DIABETES MELLITUS WITHOUT COMPLICATION, WITH LONG-TERM CURRENT USE OF INSULIN (HCC): Status: RESOLVED | Noted: 2018-12-05 | Resolved: 2019-11-17

## 2019-11-17 PROBLEM — E11.9 TYPE 2 DIABETES MELLITUS WITHOUT COMPLICATION, WITH LONG-TERM CURRENT USE OF INSULIN (HCC): Status: RESOLVED | Noted: 2018-12-05 | Resolved: 2019-11-17

## 2019-11-19 ENCOUNTER — HOSPITAL ENCOUNTER (OUTPATIENT)
Dept: RADIOLOGY | Facility: IMAGING CENTER | Age: 74
Discharge: HOME | End: 2019-11-19
Payer: COMMERCIAL

## 2019-11-19 DIAGNOSIS — Z78.0 MENOPAUSE PRESENT: ICD-10-CM

## 2019-11-19 PROCEDURE — 77080 DXA BONE DENSITY AXIAL: CPT

## 2019-11-20 ENCOUNTER — TELEPHONE (OUTPATIENT)
Dept: OBGYN CLINIC | Facility: MEDICAL CENTER | Age: 74
End: 2019-11-20

## 2019-11-20 DIAGNOSIS — N76.0 VAGINITIS AND VULVOVAGINITIS: ICD-10-CM

## 2019-11-20 NOTE — TELEPHONE ENCOUNTER
Patient returned phone call  I let her and family member know to try and answer phone calls during the time before and after surgery or at least return them as soon as they can  I let them know to call drew back regarding preoperative planning  They verbalized understanding and agree

## 2019-11-21 RX ORDER — FLUCONAZOLE 150 MG/1
TABLET ORAL
Qty: 2 TABLET | Refills: 0 | OUTPATIENT
Start: 2019-11-21

## 2019-11-21 NOTE — TELEPHONE ENCOUNTER
Can not take more, If she is still having vaginitis symptoms she must stop Jardiance from Endocrinologist

## 2019-11-26 ENCOUNTER — HOSPITAL ENCOUNTER (INPATIENT)
Facility: HOSPITAL | Age: 74
LOS: 2 days | Discharge: HOME HEALTH | DRG: 470 | End: 2019-11-28
Attending: ORTHOPAEDIC SURGERY | Admitting: ORTHOPAEDIC SURGERY
Payer: COMMERCIAL

## 2019-11-26 ENCOUNTER — APPOINTMENT (INPATIENT)
Dept: RADIOLOGY | Facility: HOSPITAL | Age: 74
DRG: 470 | End: 2019-11-26
Payer: COMMERCIAL

## 2019-11-26 ENCOUNTER — ANESTHESIA (OUTPATIENT)
Dept: PERIOP | Facility: HOSPITAL | Age: 74
DRG: 470 | End: 2019-11-26
Payer: COMMERCIAL

## 2019-11-26 DIAGNOSIS — M17.11 PRIMARY OSTEOARTHRITIS OF RIGHT KNEE: Primary | ICD-10-CM

## 2019-11-26 DIAGNOSIS — E03.9 HYPOTHYROIDISM, UNSPECIFIED TYPE: ICD-10-CM

## 2019-11-26 DIAGNOSIS — Z96.651 STATUS POST TOTAL RIGHT KNEE REPLACEMENT USING CEMENT: ICD-10-CM

## 2019-11-26 PROBLEM — E78.5 HYPERLIPIDEMIA: Status: ACTIVE | Noted: 2019-11-26

## 2019-11-26 PROBLEM — I10 BENIGN ESSENTIAL HTN: Status: ACTIVE | Noted: 2019-11-26

## 2019-11-26 LAB
GLUCOSE SERPL-MCNC: 113 MG/DL (ref 65–140)
GLUCOSE SERPL-MCNC: 113 MG/DL (ref 65–140)
GLUCOSE SERPL-MCNC: 170 MG/DL (ref 65–140)
GLUCOSE SERPL-MCNC: 195 MG/DL (ref 65–140)

## 2019-11-26 PROCEDURE — 97163 PT EVAL HIGH COMPLEX 45 MIN: CPT

## 2019-11-26 PROCEDURE — 97167 OT EVAL HIGH COMPLEX 60 MIN: CPT

## 2019-11-26 PROCEDURE — C1776 JOINT DEVICE (IMPLANTABLE): HCPCS | Performed by: ORTHOPAEDIC SURGERY

## 2019-11-26 PROCEDURE — C9290 INJ, BUPIVACAINE LIPOSOME: HCPCS | Performed by: ORTHOPAEDIC SURGERY

## 2019-11-26 PROCEDURE — 0SRC0J9 REPLACEMENT OF RIGHT KNEE JOINT WITH SYNTHETIC SUBSTITUTE, CEMENTED, OPEN APPROACH: ICD-10-PCS | Performed by: ORTHOPAEDIC SURGERY

## 2019-11-26 PROCEDURE — 99024 POSTOP FOLLOW-UP VISIT: CPT | Performed by: ORTHOPAEDIC SURGERY

## 2019-11-26 PROCEDURE — 99255 IP/OBS CONSLTJ NEW/EST HI 80: CPT | Performed by: STUDENT IN AN ORGANIZED HEALTH CARE EDUCATION/TRAINING PROGRAM

## 2019-11-26 PROCEDURE — 27447 TOTAL KNEE ARTHROPLASTY: CPT | Performed by: PHYSICIAN ASSISTANT

## 2019-11-26 PROCEDURE — G8988 SELF CARE GOAL STATUS: HCPCS

## 2019-11-26 PROCEDURE — G8978 MOBILITY CURRENT STATUS: HCPCS

## 2019-11-26 PROCEDURE — C1713 ANCHOR/SCREW BN/BN,TIS/BN: HCPCS | Performed by: ORTHOPAEDIC SURGERY

## 2019-11-26 PROCEDURE — G8979 MOBILITY GOAL STATUS: HCPCS

## 2019-11-26 PROCEDURE — 82948 REAGENT STRIP/BLOOD GLUCOSE: CPT

## 2019-11-26 PROCEDURE — 73560 X-RAY EXAM OF KNEE 1 OR 2: CPT

## 2019-11-26 PROCEDURE — 27447 TOTAL KNEE ARTHROPLASTY: CPT | Performed by: ORTHOPAEDIC SURGERY

## 2019-11-26 PROCEDURE — G8987 SELF CARE CURRENT STATUS: HCPCS

## 2019-11-26 DEVICE — ATTUNE KNEE SYSTEM FEMORAL POSTERIOR STABILIZED NARROW SIZE 4N RIGHT CEMENTED
Type: IMPLANTABLE DEVICE | Site: KNEE | Status: FUNCTIONAL
Brand: ATTUNE

## 2019-11-26 DEVICE — SMARTSET GMV HIGH PERFORMANCE GENTAMICIN MEDIUM VISCOSITY BONE CEMENT 40G
Type: IMPLANTABLE DEVICE | Site: KNEE | Status: FUNCTIONAL
Brand: SMARTSET

## 2019-11-26 DEVICE — ATTUNE KNEE SYSTEM TIBIAL INSERT FIXED BEARING POSTERIOR STABILIZED 4 10MM AOX
Type: IMPLANTABLE DEVICE | Site: KNEE | Status: FUNCTIONAL
Brand: ATTUNE

## 2019-11-26 DEVICE — ATTUNE KNEE SYSTEM TIBIAL BASE FIXED BEARING SIZE 3 CEMENTED
Type: IMPLANTABLE DEVICE | Site: KNEE | Status: FUNCTIONAL
Brand: ATTUNE

## 2019-11-26 DEVICE — ATTUNE PATELLA MEDIALIZED DOME 35MM CEMENTED AOX
Type: IMPLANTABLE DEVICE | Site: PATELLA | Status: FUNCTIONAL
Brand: ATTUNE

## 2019-11-26 RX ORDER — SODIUM CHLORIDE 9 MG/ML
75 INJECTION, SOLUTION INTRAVENOUS CONTINUOUS
Status: DISCONTINUED | OUTPATIENT
Start: 2019-11-26 | End: 2019-11-26

## 2019-11-26 RX ORDER — MIDAZOLAM HYDROCHLORIDE 2 MG/2ML
INJECTION, SOLUTION INTRAMUSCULAR; INTRAVENOUS AS NEEDED
Status: DISCONTINUED | OUTPATIENT
Start: 2019-11-26 | End: 2019-11-26 | Stop reason: SURG

## 2019-11-26 RX ORDER — LEVOTHYROXINE SODIUM 0.07 MG/1
75 TABLET ORAL
Status: DISCONTINUED | OUTPATIENT
Start: 2019-11-30 | End: 2019-11-28 | Stop reason: HOSPADM

## 2019-11-26 RX ORDER — ONDANSETRON 2 MG/ML
4 INJECTION INTRAMUSCULAR; INTRAVENOUS ONCE AS NEEDED
Status: DISCONTINUED | OUTPATIENT
Start: 2019-11-26 | End: 2019-11-26 | Stop reason: HOSPADM

## 2019-11-26 RX ORDER — FENTANYL CITRATE/PF 50 MCG/ML
25 SYRINGE (ML) INJECTION
Status: COMPLETED | OUTPATIENT
Start: 2019-11-26 | End: 2019-11-26

## 2019-11-26 RX ORDER — EPHEDRINE SULFATE 50 MG/ML
INJECTION INTRAVENOUS AS NEEDED
Status: DISCONTINUED | OUTPATIENT
Start: 2019-11-26 | End: 2019-11-26 | Stop reason: SURG

## 2019-11-26 RX ORDER — LEVOTHYROXINE SODIUM 0.05 MG/1
50 TABLET ORAL
Status: DISCONTINUED | OUTPATIENT
Start: 2019-11-27 | End: 2019-11-28 | Stop reason: HOSPADM

## 2019-11-26 RX ORDER — CALCIUM CARBONATE 200(500)MG
1000 TABLET,CHEWABLE ORAL DAILY PRN
Status: DISCONTINUED | OUTPATIENT
Start: 2019-11-26 | End: 2019-11-28 | Stop reason: HOSPADM

## 2019-11-26 RX ORDER — CHLORHEXIDINE GLUCONATE 0.12 MG/ML
15 RINSE ORAL ONCE
Status: COMPLETED | OUTPATIENT
Start: 2019-11-26 | End: 2019-11-26

## 2019-11-26 RX ORDER — ACETAMINOPHEN 325 MG/1
650 TABLET ORAL EVERY 4 HOURS PRN
Status: DISCONTINUED | OUTPATIENT
Start: 2019-11-26 | End: 2019-11-28 | Stop reason: HOSPADM

## 2019-11-26 RX ORDER — MAGNESIUM HYDROXIDE 1200 MG/15ML
LIQUID ORAL AS NEEDED
Status: DISCONTINUED | OUTPATIENT
Start: 2019-11-26 | End: 2019-11-26 | Stop reason: HOSPADM

## 2019-11-26 RX ORDER — OXYCODONE HYDROCHLORIDE 5 MG/1
5 TABLET ORAL EVERY 4 HOURS PRN
Status: DISCONTINUED | OUTPATIENT
Start: 2019-11-26 | End: 2019-11-27

## 2019-11-26 RX ORDER — SODIUM CHLORIDE 9 MG/ML
INJECTION INTRAVENOUS AS NEEDED
Status: DISCONTINUED | OUTPATIENT
Start: 2019-11-26 | End: 2019-11-26 | Stop reason: HOSPADM

## 2019-11-26 RX ORDER — OXYCODONE HYDROCHLORIDE 5 MG/1
TABLET ORAL
Qty: 30 TABLET | Refills: 0 | Status: SHIPPED | OUTPATIENT
Start: 2019-11-26 | End: 2019-12-06 | Stop reason: SDUPTHER

## 2019-11-26 RX ORDER — LEVOTHYROXINE SODIUM 0.07 MG/1
75 TABLET ORAL
Status: DISCONTINUED | OUTPATIENT
Start: 2019-11-26 | End: 2019-11-26

## 2019-11-26 RX ORDER — BUPIVACAINE HYDROCHLORIDE 2.5 MG/ML
INJECTION, SOLUTION INFILTRATION; PERINEURAL AS NEEDED
Status: DISCONTINUED | OUTPATIENT
Start: 2019-11-26 | End: 2019-11-26 | Stop reason: HOSPADM

## 2019-11-26 RX ORDER — LISINOPRIL 20 MG/1
20 TABLET ORAL DAILY
Status: DISCONTINUED | OUTPATIENT
Start: 2019-11-26 | End: 2019-11-28

## 2019-11-26 RX ORDER — FENTANYL CITRATE 50 UG/ML
INJECTION, SOLUTION INTRAMUSCULAR; INTRAVENOUS AS NEEDED
Status: DISCONTINUED | OUTPATIENT
Start: 2019-11-26 | End: 2019-11-26 | Stop reason: SURG

## 2019-11-26 RX ORDER — FERROUS SULFATE 325(65) MG
325 TABLET ORAL 2 TIMES DAILY WITH MEALS
Status: DISCONTINUED | OUTPATIENT
Start: 2019-11-26 | End: 2019-11-28 | Stop reason: HOSPADM

## 2019-11-26 RX ORDER — SODIUM CHLORIDE 9 MG/ML
100 INJECTION, SOLUTION INTRAVENOUS CONTINUOUS
Status: DISCONTINUED | OUTPATIENT
Start: 2019-11-26 | End: 2019-11-28 | Stop reason: HOSPADM

## 2019-11-26 RX ORDER — ACETAMINOPHEN 325 MG/1
975 TABLET ORAL ONCE
Status: COMPLETED | OUTPATIENT
Start: 2019-11-26 | End: 2019-11-26

## 2019-11-26 RX ORDER — CHLORHEXIDINE GLUCONATE 4 G/100ML
SOLUTION TOPICAL DAILY PRN
Status: DISCONTINUED | OUTPATIENT
Start: 2019-11-26 | End: 2019-11-26 | Stop reason: HOSPADM

## 2019-11-26 RX ORDER — TETRACAINE HCL 10 MG/ML
INJECTION SUBARACHNOID AS NEEDED
Status: DISCONTINUED | OUTPATIENT
Start: 2019-11-26 | End: 2019-11-26 | Stop reason: SURG

## 2019-11-26 RX ORDER — ASCORBIC ACID 500 MG
500 TABLET ORAL 2 TIMES DAILY
Status: DISCONTINUED | OUTPATIENT
Start: 2019-11-26 | End: 2019-11-28 | Stop reason: HOSPADM

## 2019-11-26 RX ORDER — ONDANSETRON 2 MG/ML
INJECTION INTRAMUSCULAR; INTRAVENOUS AS NEEDED
Status: DISCONTINUED | OUTPATIENT
Start: 2019-11-26 | End: 2019-11-26 | Stop reason: SURG

## 2019-11-26 RX ORDER — MORPHINE SULFATE 4 MG/ML
4 INJECTION, SOLUTION INTRAMUSCULAR; INTRAVENOUS EVERY 4 HOURS PRN
Status: DISCONTINUED | OUTPATIENT
Start: 2019-11-26 | End: 2019-11-27

## 2019-11-26 RX ORDER — PROPOFOL 10 MG/ML
INJECTION, EMULSION INTRAVENOUS AS NEEDED
Status: DISCONTINUED | OUTPATIENT
Start: 2019-11-26 | End: 2019-11-26 | Stop reason: SURG

## 2019-11-26 RX ORDER — HYDROMORPHONE HCL/PF 1 MG/ML
0.5 SYRINGE (ML) INJECTION
Status: DISCONTINUED | OUTPATIENT
Start: 2019-11-26 | End: 2019-11-26 | Stop reason: HOSPADM

## 2019-11-26 RX ORDER — OXYCODONE HYDROCHLORIDE 10 MG/1
10 TABLET ORAL EVERY 4 HOURS PRN
Status: DISCONTINUED | OUTPATIENT
Start: 2019-11-26 | End: 2019-11-27

## 2019-11-26 RX ORDER — CEFAZOLIN SODIUM 1 G/50ML
1000 SOLUTION INTRAVENOUS EVERY 8 HOURS
Status: COMPLETED | OUTPATIENT
Start: 2019-11-26 | End: 2019-11-26

## 2019-11-26 RX ORDER — FOLIC ACID 1 MG/1
1 TABLET ORAL DAILY
Status: DISCONTINUED | OUTPATIENT
Start: 2019-11-26 | End: 2019-11-28 | Stop reason: HOSPADM

## 2019-11-26 RX ORDER — DOCUSATE SODIUM 100 MG/1
100 CAPSULE, LIQUID FILLED ORAL 2 TIMES DAILY
Qty: 20 CAPSULE | Refills: 0 | Status: SHIPPED | OUTPATIENT
Start: 2019-11-26 | End: 2020-01-28

## 2019-11-26 RX ORDER — DOCUSATE SODIUM 100 MG/1
100 CAPSULE, LIQUID FILLED ORAL 2 TIMES DAILY
Status: DISCONTINUED | OUTPATIENT
Start: 2019-11-26 | End: 2019-11-28 | Stop reason: HOSPADM

## 2019-11-26 RX ORDER — GLIMEPIRIDE 2 MG/1
2 TABLET ORAL
Status: DISCONTINUED | OUTPATIENT
Start: 2019-11-26 | End: 2019-11-26

## 2019-11-26 RX ORDER — SENNOSIDES 8.6 MG
1 TABLET ORAL DAILY
Status: DISCONTINUED | OUTPATIENT
Start: 2019-11-26 | End: 2019-11-28 | Stop reason: HOSPADM

## 2019-11-26 RX ORDER — PRAVASTATIN SODIUM 40 MG
40 TABLET ORAL
Status: DISCONTINUED | OUTPATIENT
Start: 2019-11-26 | End: 2019-11-28 | Stop reason: HOSPADM

## 2019-11-26 RX ORDER — KETOROLAC TROMETHAMINE 30 MG/ML
INJECTION, SOLUTION INTRAMUSCULAR; INTRAVENOUS AS NEEDED
Status: DISCONTINUED | OUTPATIENT
Start: 2019-11-26 | End: 2019-11-26 | Stop reason: SURG

## 2019-11-26 RX ORDER — CEFAZOLIN SODIUM 2 G/50ML
2000 SOLUTION INTRAVENOUS ONCE
Status: COMPLETED | OUTPATIENT
Start: 2019-11-26 | End: 2019-11-26

## 2019-11-26 RX ORDER — PANTOPRAZOLE SODIUM 40 MG/1
40 TABLET, DELAYED RELEASE ORAL
Status: DISCONTINUED | OUTPATIENT
Start: 2019-11-26 | End: 2019-11-28 | Stop reason: HOSPADM

## 2019-11-26 RX ORDER — TRANEXAMIC ACID 100 MG/ML
INJECTION, SOLUTION INTRAVENOUS AS NEEDED
Status: DISCONTINUED | OUTPATIENT
Start: 2019-11-26 | End: 2019-11-26 | Stop reason: SURG

## 2019-11-26 RX ORDER — PROPOFOL 10 MG/ML
INJECTION, EMULSION INTRAVENOUS CONTINUOUS PRN
Status: DISCONTINUED | OUTPATIENT
Start: 2019-11-26 | End: 2019-11-26 | Stop reason: SURG

## 2019-11-26 RX ADMIN — ENOXAPARIN SODIUM 40 MG: 40 INJECTION SUBCUTANEOUS at 22:40

## 2019-11-26 RX ADMIN — FERROUS SULFATE TAB 325 MG (65 MG ELEMENTAL FE) 325 MG: 325 (65 FE) TAB at 16:56

## 2019-11-26 RX ADMIN — SODIUM CHLORIDE 100 ML/HR: 0.9 INJECTION, SOLUTION INTRAVENOUS at 12:00

## 2019-11-26 RX ADMIN — INSULIN LISPRO 1 UNITS: 100 INJECTION, SOLUTION INTRAVENOUS; SUBCUTANEOUS at 22:40

## 2019-11-26 RX ADMIN — OXYCODONE HYDROCHLORIDE 5 MG: 5 TABLET ORAL at 20:12

## 2019-11-26 RX ADMIN — LIDOCAINE HYDROCHLORIDE 60 MG: 20 INJECTION, SOLUTION INTRAVENOUS at 07:45

## 2019-11-26 RX ADMIN — EPHEDRINE SULFATE 5 MG: 50 INJECTION, SOLUTION INTRAVENOUS at 08:00

## 2019-11-26 RX ADMIN — SENNOSIDES 8.6 MG: 8.6 TABLET, FILM COATED ORAL at 13:22

## 2019-11-26 RX ADMIN — SODIUM CHLORIDE: 0.9 INJECTION, SOLUTION INTRAVENOUS at 09:37

## 2019-11-26 RX ADMIN — CHLORHEXIDINE GLUCONATE 0.12% ORAL RINSE 15 ML: 1.2 LIQUID ORAL at 05:50

## 2019-11-26 RX ADMIN — FENTANYL CITRATE 25 MCG: 50 INJECTION, SOLUTION INTRAMUSCULAR; INTRAVENOUS at 11:44

## 2019-11-26 RX ADMIN — LISINOPRIL 20 MG: 20 TABLET ORAL at 12:23

## 2019-11-26 RX ADMIN — PROPOFOL 50 MG: 10 INJECTION, EMULSION INTRAVENOUS at 07:45

## 2019-11-26 RX ADMIN — INSULIN LISPRO 1 UNITS: 100 INJECTION, SOLUTION INTRAVENOUS; SUBCUTANEOUS at 16:57

## 2019-11-26 RX ADMIN — FENTANYL CITRATE 25 MCG: 50 INJECTION, SOLUTION INTRAMUSCULAR; INTRAVENOUS at 08:23

## 2019-11-26 RX ADMIN — FENTANYL CITRATE 25 MCG: 50 INJECTION, SOLUTION INTRAMUSCULAR; INTRAVENOUS at 07:45

## 2019-11-26 RX ADMIN — TRANEXAMIC ACID 1000 MG: 1 INJECTION, SOLUTION INTRAVENOUS at 08:17

## 2019-11-26 RX ADMIN — SODIUM CHLORIDE: 0.9 INJECTION, SOLUTION INTRAVENOUS at 07:47

## 2019-11-26 RX ADMIN — FOLIC ACID 1 MG: 1 TABLET ORAL at 13:30

## 2019-11-26 RX ADMIN — SODIUM CHLORIDE 100 ML/HR: 0.9 INJECTION, SOLUTION INTRAVENOUS at 22:46

## 2019-11-26 RX ADMIN — PANTOPRAZOLE SODIUM 40 MG: 40 TABLET, DELAYED RELEASE ORAL at 13:25

## 2019-11-26 RX ADMIN — EPHEDRINE SULFATE 5 MG: 50 INJECTION, SOLUTION INTRAVENOUS at 08:12

## 2019-11-26 RX ADMIN — PROPOFOL 50 MCG/KG/MIN: 10 INJECTION, EMULSION INTRAVENOUS at 07:45

## 2019-11-26 RX ADMIN — CEFAZOLIN SODIUM 2000 MG: 2 SOLUTION INTRAVENOUS at 07:40

## 2019-11-26 RX ADMIN — ACETAMINOPHEN 975 MG: 325 TABLET ORAL at 05:48

## 2019-11-26 RX ADMIN — IRON SUCROSE 300 MG: 20 INJECTION, SOLUTION INTRAVENOUS at 13:30

## 2019-11-26 RX ADMIN — DOCUSATE SODIUM 100 MG: 100 CAPSULE, LIQUID FILLED ORAL at 17:00

## 2019-11-26 RX ADMIN — DOCUSATE SODIUM 100 MG: 100 CAPSULE, LIQUID FILLED ORAL at 13:22

## 2019-11-26 RX ADMIN — OXYCODONE HYDROCHLORIDE AND ACETAMINOPHEN 500 MG: 500 TABLET ORAL at 13:30

## 2019-11-26 RX ADMIN — FENTANYL CITRATE 50 MCG: 50 INJECTION, SOLUTION INTRAMUSCULAR; INTRAVENOUS at 07:41

## 2019-11-26 RX ADMIN — KETOROLAC TROMETHAMINE 15 MG: 30 INJECTION, SOLUTION INTRAMUSCULAR at 10:26

## 2019-11-26 RX ADMIN — PRAVASTATIN SODIUM 40 MG: 40 TABLET ORAL at 16:57

## 2019-11-26 RX ADMIN — SODIUM CHLORIDE 75 ML/HR: 0.9 INJECTION, SOLUTION INTRAVENOUS at 06:09

## 2019-11-26 RX ADMIN — FENTANYL CITRATE 25 MCG: 50 INJECTION, SOLUTION INTRAMUSCULAR; INTRAVENOUS at 11:34

## 2019-11-26 RX ADMIN — FENTANYL CITRATE 25 MCG: 50 INJECTION, SOLUTION INTRAMUSCULAR; INTRAVENOUS at 11:24

## 2019-11-26 RX ADMIN — MIDAZOLAM 2 MG: 1 INJECTION INTRAMUSCULAR; INTRAVENOUS at 07:41

## 2019-11-26 RX ADMIN — OXYCODONE HYDROCHLORIDE AND ACETAMINOPHEN 500 MG: 500 TABLET ORAL at 17:00

## 2019-11-26 RX ADMIN — TETRACAINE HCL 1.2 ML: 10 INJECTION SUBARACHNOID at 07:43

## 2019-11-26 RX ADMIN — ACETAMINOPHEN 650 MG: 325 TABLET ORAL at 22:45

## 2019-11-26 RX ADMIN — OXYCODONE HYDROCHLORIDE 5 MG: 5 TABLET ORAL at 15:10

## 2019-11-26 RX ADMIN — FENTANYL CITRATE 25 MCG: 50 INJECTION, SOLUTION INTRAMUSCULAR; INTRAVENOUS at 11:54

## 2019-11-26 RX ADMIN — ONDANSETRON 4 MG: 2 INJECTION INTRAMUSCULAR; INTRAVENOUS at 10:27

## 2019-11-26 RX ADMIN — PROPOFOL 100 MCG/KG/MIN: 10 INJECTION, EMULSION INTRAVENOUS at 09:36

## 2019-11-26 RX ADMIN — CEFAZOLIN SODIUM 1000 MG: 1 SOLUTION INTRAVENOUS at 22:46

## 2019-11-26 RX ADMIN — CEFAZOLIN SODIUM 1000 MG: 1 SOLUTION INTRAVENOUS at 15:39

## 2019-11-26 RX ADMIN — Medication 1 TABLET: at 13:23

## 2019-11-26 NOTE — INTERVAL H&P NOTE
H&P reviewed  After examining the patient I find no changes in the patients condition since the H&P had been written      Vitals:    11/26/19 0531   BP: 147/65   Pulse: 69   Resp: 16   Temp: (!) 96 7 °F (35 9 °C)   SpO2: 96%

## 2019-11-26 NOTE — PROGRESS NOTES
Progress Note - Orthopedics   Faylene Ganser 69 y o  female MRN: [id number]  Unit/Bed#: OR POOL Encounter: [id number]    Assessment:  69 y o  female with right knee arthritis    Plan: For right total knee arthroplasty today  NPO  Consented  Pain control prn    Subjective: Pt S&E in preop holding  No further questions  Ready for OR  Vitals: Blood pressure 147/65, pulse 69, temperature (!) 96 7 °F (35 9 °C), temperature source Tympanic, resp  rate 16, height 4' 11" (1 499 m), weight 89 8 kg (198 lb), SpO2 96 %, not currently breastfeeding  ,Body mass index is 39 99 kg/m²        Intake/Output Summary (Last 24 hours) at 11/26/2019 0741  Last data filed at 11/26/2019 8598  Gross per 24 hour   Intake 200 ml   Output    Net 200 ml       Invasive Devices     Peripheral Intravenous Line            Peripheral IV 11/26/19 Left Forearm less than 1 day                Ortho Exam: rightLE: sensation grossly intact L4, L5, S1, palpable pedal pulse, EHL/AT/GS intact  Knee:  Skin intact

## 2019-11-26 NOTE — PLAN OF CARE
Problem: PHYSICAL THERAPY ADULT  Goal: Performs mobility at highest level of function for planned discharge setting  See evaluation for individualized goals  Description  Treatment/Interventions: Functional transfer training, LE strengthening/ROM, ADL retraining, Elevations, Therapeutic exercise, Endurance training, Patient/family training, Equipment eval/education, Bed mobility, Gait training, Compensatory technique education, Spoke to nursing, OT  Equipment Recommended: Walker(pt owns RW)       See flowsheet documentation for full assessment, interventions and recommendations  Note:   Prognosis: Good  Problem List: Decreased strength, Decreased range of motion, Decreased endurance, Impaired balance, Decreased mobility, Decreased coordination, Decreased skin integrity, Pain  Assessment: Price Gaitan is a 69 y o  female admitted to [address] [place] [address] on 11/26/2019 for Primary osteoarthritis of right knee  Pt  has a past medical history of Arthritis, Diabetes ([facility] [address] ), Endometrial ca ([facility] [address] ) (2000), GERD (gastroesophageal reflux disease), Hard of hearing, Hyperlipidemia, Hypertension, Obesity, Stress incontinence, Swelling of both lower extremities, Thyroid disease, Use of cane as ambulatory aid, and Wears glasses    PT was consulted and pt was seen on 11/26/2019 for a high complexity PT EVALUATION  Pt presents with catheter, NC, PIV, fall risk precautions, WBAT RLE orders, monitoring of abn vitals, monitoring of abn labs, surgical incision of R knee  Pt lives with her dtr and son in law in a multi level home with 1 RAJAT, 6 steps to bed and 1/2 bath, 6+6+6 steps to bathroom (from bedroom level)  Prior to admission pt required modified independent assistance for transfers, elevations and ambulation using a [address] and received no assist for ADLs   Pt is currently functioning at a minimum assist x1 level for bed mobility (LE and line management), minimum assistance x1 level for transfers, minimum assistance x2 level for ambulation with Rolling Walker  Pt vitals recorded throughout session as 135/59 supine, 118/57 EOB, 107/54 supine end of session, 119/48 supine 2'  Amb distance limited by patient reports of lightheadedness and pt require mod Ax2 to return to bed for safety  Pt will benefit from continued skilled IP PT to address the above mentioned impairments  in order to maximize recovery and increase functional independence when completing mobility and ADLs  Currently PT recommendations for DME include RW (patient owns)  At this time PT recommendations for d/c are home w family support and OPPT vs STR pending progress of amb and stairs  End of session pt repositioned in bed, ice pack on R knee, SCDs donned, call bell and all needs in reach; dtr present throughout session and nsg updated  Barriers to Discharge: Inaccessible home environment  Barriers to Discharge Comments: RAJAT, steps within home  Recommendation: Home with family support, Outpatient PT, Short-term skilled PT(home w support vs STR pending progress)     PT - OK to Discharge: Yes(to STR; no to home)    See flowsheet documentation for full assessment

## 2019-11-26 NOTE — CONSULTS
Consult- Bertha House 1945, 69 y o  female MRN: [id number]    Unit/Bed#: E2 -01 Encounter: [id number]    Primary Care Provider: Tracy Reardon MD   Date and time admitted to hospital: 11/26/2019  5:21 AM      Inpatient consult to Internal Medicine  Consult performed by: Yakov Kimball PA-C  Consult ordered by: Leana Reeder PA-C          * Primary osteoarthritis of right knee  Assessment & Plan  S/p elective right TKA POD #0  Pain well controlled  Continue AC, pt/ot/analgesics per 1* team      Hyperlipidemia  Assessment & Plan  Continue pravachol as substitute for lovastatin    Benign essential HTN  Assessment & Plan  Continue lisinopril substituted for vasotec  F/u bmp in am    Hypothyroidism  Assessment & Plan  Clarified pt's dose she takes levothyroxine 50mg daily M-F and 75mg Sat/Sun  Continue levothyroxine as noted above    Diabetes mellitus type 2, controlled [facility])  Assessment & Plan  Lab Results   Component Value Date    HGBA1C 7 0 (H) 11/06/2019       Recent Labs     11/26/19  0608 11/26/19  1209   POCGLU 113 113       Blood Sugar Average: Last 72 hrs:  (P) 113   Niddm well controlled  Would defer jardiance due to surgical risk of volume depletion and risk of jase  Hold amaryl and start ssi/poc bs for BS >150mg/dl      Morbid obesity   Encourage weight loss    VTE Prophylaxis: Enoxaparin (Lovenox)  / sequential compression device     Recommendations for Discharge:  · none    Counseling / Coordination of Care Time: 45 minutes  Greater than 50% of total time spent on patient counseling and coordination of care  Collaboration of Care: Were Recommendations Directly Discussed with Primary Treatment Team? - No     History of Present Illness:    Bertha Huose is a 69 y o  female who is originally admitted to the ortho service due to osetoarthritis of right knee  We are consulted for med mgmt    Pt is a pleasant 70 yo w/pmh of osteoarthritis of right knee, niddm, htn, hld, hypothyroid and morbid obesity coming to hospital for elective TKA  she is s/p right TKA POD #0  Pain well controlled no nausea, no cp/sob  She notes her DM is controlled with amaryl, januvia, and ozempic  She is on vasotec for renal protection and lovastatin for her hld  She has no cad/chf/cva  She notes she had worked substantially to have her hgb a1c improved adequately for clearance preoperatively  She does take levothyroxine daily but notes that she takes 50mcg mon-fri and takes 75mcg sat/sun  She did take her dose prior to surgery this am             Review of Systems:    Review of Systems   Constitutional: Negative for appetite change, chills and fever  Respiratory: Negative for shortness of breath  Cardiovascular: Negative for chest pain  Gastrointestinal: Negative for abdominal pain, diarrhea, nausea and vomiting  Musculoskeletal: Positive for arthralgias and joint swelling  Negative for myalgias  All other systems reviewed and are negative  Past Medical and Surgical History:     Past Medical History:   Diagnosis Date    Arthritis     Diabetes ([facility] )     Endometrial ca ([facility] )     GERD (gastroesophageal reflux disease)     Hard of hearing     Hyperlipidemia     Hypertension     Obesity     Stress incontinence     Swelling of both lower extremities     Thyroid disease     hypo    Use of cane as ambulatory aid     Wears glasses        Past Surgical History:   Procedure Laterality Date    CATARACT EXTRACTION Bilateral      SECTION      COLONOSCOPY  02/15/2016    small polyp, repeat 5yrs    EGD      HYSTERECTOMY      age 48    OOPHORECTOMY      both removed age 48       Meds/Allergies:    all medications and allergies reviewed    Allergies: Allergies   Allergen Reactions    No Known Allergies        Social History:     Marital Status:      Substance Use History:   Social History     Substance and Sexual Activity   Alcohol Use No    Frequency: Never     Social History Tobacco Use   Smoking Status Former Smoker    Last attempt to quit: 10/28/1971    Years since quittin 1   Smokeless Tobacco Never Used     Social History     Substance and Sexual Activity   Drug Use No       Family History:    Family History   Problem Relation Age of Onset    Diabetes Father     Hypertension Father     Breast cancer Mother [de-identified]    Breast cancer Sister 46    Endometrial cancer Daughter 52       Physical Exam:     Vitals:   Blood Pressure: 138/57 (19 1227)  Pulse: 65 (19 1227)  Temperature: 97 6 °F (36 4 °C) (19 1227)  Temp Source: Oral (19 1155)  Respirations: 14 (19 122)  Height: 4' 11" (149 9 cm) (19 05)  Weight - Scale: 89 8 kg (198 lb) (19)  SpO2: 96 % (19 122)    Physical Exam   Constitutional: She appears well-developed and well-nourished  No distress  HENT:   Head: Normocephalic and atraumatic  Right Ear: External ear normal    Left Ear: External ear normal    Eyes: Conjunctivae are normal    Neck: Normal range of motion  Cardiovascular: Normal rate and regular rhythm  Exam reveals no gallop and no friction rub  No murmur heard  Pulmonary/Chest: Effort normal  No respiratory distress  She has no wheezes  She has no rales  Abdominal: Soft  She exhibits no distension and no mass  There is no tenderness  There is no guarding  Hypoactive bs x4   Genitourinary:   Genitourinary Comments: Romeo catheter in place   Musculoskeletal: She exhibits tenderness (mild ttp of right lower leg inferior to patella)  Neurological: She is alert  Skin: Skin is warm and dry  She is not diaphoretic  Psychiatric: She has a normal mood and affect  Vitals reviewed  (  Be Sure to Include Physical Exam: Delete this entire line when you have entered your exam)    Additional Data:     Lab Results: I have personally reviewed pertinent reports      Op bloodwork reviewed                  Lab Results   Component Value Date/Time HGBA1C 7 0 (H) 11/06/2019 10:16 AM    HGBA1C 7 0 (H) 10/28/2019 11:45 AM    HGBA1C 7 3 (H) 04/25/2019 08:57 AM     Results from last 7 days   Lab Units 11/26/19  1209 11/26/19  0608   POC GLUCOSE mg/dl 113 113           Imaging: none reviewed this admission    XR knee right 1 or 2 views   Final Result by Elizabeth Ireland MD (11/26 1307)      Expected postoperative appearance, status post new total knee arthroplasty  Workstation performed: [id number]             EKG, Pathology, and Other Studies Reviewed on Admission:   · EKG:     ** Please Note: This note has been constructed using a voice recognition system   **

## 2019-11-26 NOTE — PLAN OF CARE
Problem: OCCUPATIONAL THERAPY ADULT  Goal: Performs self-care activities at highest level of function for planned discharge setting  See evaluation for individualized goals  Description  Treatment Interventions: ADL retraining, Functional transfer training, UE strengthening/ROM, Endurance training, Patient/family training, Equipment evaluation/education, Compensatory technique education, Energy conservation, Activityengagement          See flowsheet documentation for full assessment, interventions and recommendations  Outcome: Progressing  Note:   Limitation: Decreased ADL status, Decreased UE strength, Decreased endurance, Decreased self-care trans, Decreased high-level ADLs  Prognosis: Good  Assessment: Pt is a 69 y o  female seen for OT evaluation s/p adm to [facility] on 11/26/2019 s/p R total knee arthroplasty  Pt w/ orders for WBAT R LE  Comorbidities affecting pts functional performance include a significant PMH of Arthritis, Diabetes, Endometrial CA, HLD, HTN, Obesity, Stress incontinence, and Hypothyroidism  Pt with active OT orders and activity orders for Activity Beginning POD #0  Pt lives with dtr and son-in-law in a multi-level house with 1 RAJAT and 6 steps down to bedroom and 1/2 bath  From bedroom, pt must take 6+6+6 steps to full bath  At baseline, pt was I w/ ADLs, IADLs, and functional mobility/transfers w/ use of SPC and reports 1 fall PTA  Upon evaluation, pt currently requires Supervision for UB ADLs, Mod A for LB ADLs, Mod A for toileting, Mod A of 2 for bed mobility, Min A for transfers, and Min A of 2 for functional mobility 2* the following deficits impacting occupational performance: weakness, decreased strength, decreased balance, decreased tolerance and increased pain  These impairments, as well at pts steps to enter environment, difficulty performing ADLS and difficulty performing IADLS  limit pts ability to safely engage in all baseline areas of occupation   Pt scored overall 40/100 on the Barthel Index  Pt to continue to benefit from continued acute OT services during hospital stay to address defined deficits and to maximize level of functional independence in the following Occupational Performance areas: grooming, bathing/shower, toilet hygiene, dressing, medication management, health maintenance, functional mobility, community mobility, clothing management, cleaning, meal prep and household maintenance  From OT standpoint, recommend Home OT upon D/C, however will continue to monitor   OT will continue to follow pt 3-5x/wk to address the following goals to  w/in 7-10 days:     OT Discharge Recommendation: Home OT(Will continue to monitor pending progress)

## 2019-11-26 NOTE — OP NOTE
OPERATIVE REPORT  PATIENT NAME: Monika Bumpers    :  1945  MRN: [id number]  Pt Location: [facility] OR ROOM 01    SURGERY DATE: 2019    Surgeon(s) and Role:     * Bre Reynolds, DO - Primary     * Brianne Blood, GINNA - Assisting    Preop Diagnosis:  Primary osteoarthritis of right knee [M17 11]    Post-Op Diagnosis Codes:     * Primary osteoarthritis of right knee [M17 11]    Procedure(s) (LRB):  ARTHROPLASTY KNEE TOTAL (Right)    Specimen(s):  * No specimens in log *    Estimated Blood Loss:   100 mL    Drains:  Urethral Catheter Double-lumen; Latex 16 Fr  (Active)   Site Assessment Clean;Skin intact 2019 11:10 AM   Collection Container Standard drainage bag 2019 11:10 AM   Securement Method Securing device (Describe) 2019 11:10 AM   Number of days: 0       Anesthesia Type:   spinal    Operative Indications:  Primary osteoarthritis of right knee [M17 11]      Operative Findings:  See below    Complications:   None    Implants:  depuy attune  PS femoral component size 4 narrow  Tibial base fixed bearing size3  35mm dome patella  Tibial insert fixed bearing PS size 4, 10mm    Procedure and Technique:  INDICATIONS FOR PROCEDURE:  The patients is a 69 y o  female who presented to the office with  knee OA  Conservative treatment was attempted for quite some time and ultimately failed  She has severe progressive pain, stiffness, and disability and now elects to proceed with right total knee replacement surgery  Extensive counseling in regards to the reasons for surgical intervention as well as the risks and benefits of surgery were reviewed  The risks include, but are not limited to infection, extensive blood loss, blood clots, wound healing problems, fracture, need for additional surgery, need for revision surgery, failure of hardware, persistent pain and stiffness, heart attack, stroke, death  The patient understood and agreed to by oral and written consent      OPERATIVE PROCEDURE:  The patient was identified as Claudia Smith by Her ID bracelet by the surgical staff in the preoperative area at [address] [facility]   The patient was wheeled back to the surgical room and placed on the operative table  Anesthesia was administered and the patient was intubated without complications  Preoperative antibiotics were given  The patient remained in the supine position and all bony prominences were carefully protected  A tourniquet was applied to the patients right upper thigh  The right leg was then prepped and draped in the usual sterile fashion  A timeout was performed where the patients name and surgical site were once again identified  The incision was marked out  The leg was elevated and the tourniquet was inflated to 300 mmHg  An incision was made over anterior aspect of the knee  Dissection was made through the skin and subcutaneous tissue  A medial parapatellar arthrotomy was made and the medial tissues were elevated while protecting the MCL  Remnants of the ACL, medial and lateral menisci were removed and retractors were placed  Severe osteoarthritis was noted  The femoral canal was opened with a drill and the contents were suctioned and the canal was irrigated  We used an intramedullary guide on the femoral side and resected 8mm of distal femur in 5 degrees of valgus  Medial osteophytes were removed  We then subluxated the tibia forward and opened the tibial canal with a drill  The contents of the canal were then suctioned and the canal was irrigated  We placed an intramedullary guide and resected 9mm of bone based on the highest point of the lateral tibial plateau perpendicular to the mechanical axis of the tibia  Next, the flexion and extension gaps were analyzed with a spacer block and visualization  Pines line and the epicondylar axis were then identified  A ligament tensiometer was used to check the flexion space    3 degrees of external rotation was found to produce a rectangular flexion space  The 4 in 1 cutting block was placed in 3 degrees of external rotation and resections were made  Posterior osteophytes and any remnants of the medial and lateral menisci were removed  Trials were then placed and the knee was felt to be tight in flexion medially  We then converted to a PS TKA  The box cut was made  The trials were placed once again and the knee was felt to be stable and well balanced throughout the arc of motion  The patella was measured and 10mm of the articular portion was removed  The trial for the patella was placed and patellar tracking was checked and found to be adequate with the other components in place  Attention was directed back to the tibia  External rotation of the tibial guide was set and the final preparations of the tibia were performed  The components were removed and the knee was copiously irrigated with pulse lavage and then dried  The components were then cemented in place and excess cement was removed  Once the cement was dried the trial insert was trialed  A size 4, 10mm tibial insert was confirmed to be the correct size  The final polyethylene component was placed and the tourniquet was let down  Any bleeders were cauterized and then the knee was irrigated  An irrisept wash was performed and then the knee was once again copiously irrigated  Exparel was injected throughout the case  The arthrotomy was closed with vicryl suture  The skin and subcutaneous tissues were closed with vicryl and then a running monocryl stitch  Steri strips and a sterile dressing were placed  The patient was awakened and transferred to a hospital bed and then to the recovery room in stable condition  I attest that I was present and performed this procedure  Merline Safer, PA-C was present for the entire procedure and provided essential assistance with limb position, patient prepping, and retraction     A qualified resident physician was not available    Patient Disposition:  extubated and stable    SIGNATURE: Jania Patel DO  DATE: November 26, 2019  TIME: 11:31 AM

## 2019-11-26 NOTE — ANESTHESIA PROCEDURE NOTES
Spinal Block    Patient location during procedure: OR  Start time: 11/26/2019 7:43 AM  Reason for block: at surgeon's request and primary anesthetic  Staffing  Anesthesiologist: Marisabel Green DO  Resident/CRNA: Dania Crowder CRNA  Performed: resident/CRNA and anesthesiologist   Preanesthetic Checklist  Completed: patient identified, site marked, surgical consent, pre-op evaluation, timeout performed, IV checked, risks and benefits discussed and monitors and equipment checked  Spinal Block  Patient position: sitting  Prep: Betadine  Patient monitoring: heart rate, continuous pulse ox and frequent blood pressure checks  Approach: midline  Location: L3-4  Injection technique: single-shot  Needle  Needle type: pencil-tip   Needle gauge: 24 G  Needle length: 5 cm  Assessment  Sensory level: T4  Injection Assessment:  negative aspiration for heme, no paresthesia on injection and positive aspiration for clear CSF

## 2019-11-26 NOTE — ASSESSMENT & PLAN NOTE
Clarified pt's dose she takes levothyroxine 50mg daily M-F and 75mg Sat/Sun  Continue levothyroxine as noted above

## 2019-11-26 NOTE — ASSESSMENT & PLAN NOTE
Lab Results   Component Value Date    HGBA1C 7 0 (H) 11/06/2019       Recent Labs     11/26/19  0608 11/26/19  1209   POCGLU 113 113       Blood Sugar Average: Last 72 hrs:  (P) 113   Niddm well controlled    Would defer jardiance due to surgical risk of volume depletion and risk of jase  Hold amaryl and start ssi/poc bs for BS >150mg/dl

## 2019-11-26 NOTE — PHYSICAL THERAPY NOTE
Physical Therapy Evaluation     Patient's Name: Laura Lewis    Admitting Diagnosis  Primary osteoarthritis of right knee [M17 11]    Problem List  Patient Active Problem List   Diagnosis    Diabetes mellitus type 2, controlled ([facility] )    Hypothyroidism    Bronchitis    Cough    Hypercalcemia    Primary osteoarthritis of right knee    Viral upper respiratory tract infection    Localized edema    Esophageal atresia    Venous (peripheral) insufficiency    Benign essential HTN    Hyperlipidemia    Morbid obesity ([facility] )       Past Medical History  Past Medical History:   Diagnosis Date    Arthritis     Diabetes ([facility] )     [facility]rial ca ([facility] )     GERD (gastroesophageal reflux disease)     Hard of hearing     Hyperlipidemia     Hypertension     Obesity     Stress incontinence     Swelling of both lower extremities     Thyroid disease     hypo    Use of cane as ambulatory aid     Wears glasses        Past Surgical History  Past Surgical History:   Procedure Laterality Date    CATARACT EXTRACTION Bilateral      SECTION      COLONOSCOPY  02/15/2016    small polyp, repeat 5yrs    EGD      HYSTERECTOMY      age 48    OOPHORECTOMY      both removed age 48        19 [id number]   Note Type   Note type Eval only   Pain Assessment   Pain Assessment 0-10   Pain Score 3   Pain Type Acute pain;Surgical pain   Pain Location Knee   Pain Orientation Right   Hospital Pain Intervention(s) Cold applied;Repositioned; Ambulation/increased activity   Response to Interventions tolerated w no reported increase in pain   Home Living   Type of [address] [place] [address] Multi-level;Stairs to enter without rails   [address] [place] [address]  chair   Bathroom Accessibility Accessible   Home Equipment Walker;Cane;Other (Comment)  (BSC, RW )   Additional Comments pt lives in multi story house, 1 RAJAT  6 steps w rails to lower level bedroom and half bath   6+6+6 steps w rails to only shower Prior Function   Level of [place] Independent with ADLs and functional mobility   Lives With Family;Daughter  (dtr and son in law)   Receives Help From Family   ADL Assistance Independent   IADLs Independent   Falls in the last 6 months 1 to 4  (+ injuries)   Vocational Retired   Comments pt reports use of SPC for amb on uneven surfaces  pt lives w son-in-law how is an EMT and home during the days, her dtr works but is home during the evenings  pt enjoys volunteering at [employer] and per dtr spends a lot of time working at the [employer]   Restrictions/Precautions   Weight Bearing Precautions Per Order Yes   RLE Weight Bearing Per Order WBAT   Other Precautions WBS; Multiple lines;O2;Fall Risk;Pain   General   Additional Pertinent History s/p R TKA 11/26/19   Family/Caregiver Present Yes  (dtr)   Cognition   Overall Cognitive Status WFL   Arousal/Participation Cooperative   Orientation Level Oriented X4   Memory Within functional limits   Following Commands Follows multistep commands with increased time or repetition   Comments pt pleasant and eager to participate in therapy   RUE Assessment   RUE Assessment   (refer to OT)   LUE Assessment   LUE Assessment   (refer to OT)   RLE Assessment   RLE Assessment X   Strength RLE   R Knee Flexion 3-/5  (secondary to post op pain, weakness)   R Knee Extension 3-/5  (secondary to post op pain, weakness)   LLE Assessment   LLE Assessment WFL   Coordination   Movements are Fluid and Coordinated 0   Coordination and Movement Description decreased coordination of RLE movement secondary to pain; decreased fluidity of gait and transf   Sensation WFL   Light Touch   RLE Light Touch Grossly intact   LLE Light Touch Grossly intact   Bed Mobility   Rolling L 4  Minimal assistance   Additional items Assist x 1;Bedrails; Increased time required;Verbal cues   Supine to Sit 4  Minimal assistance   Additional items Assist x 1;Bedrails; Increased time required;Verbal cues;LE management Sit to Supine 3  Moderate assistance   Additional items Assist x 2;LE management   Additional Comments mod Ax2 for safety during sit>supine secondary to [facility]   Transfers   Sit to Stand 4  Minimal assistance   Additional items Assist x 1; Increased time required;Verbal cues   Stand to Sit 4  Minimal assistance   Additional items Assist x 1; Increased time required;Verbal cues   Ambulation/Elevation   Gait pattern Improper Weight shift; Antalgic; Forward Flexion; Short stride; Excessively slow; Step to;Decreased foot clearance   Gait Assistance 4  Minimal assist  (for safety and line management)   Additional items Assist x 2;Verbal cues   Assistive Device Rolling walker   Distance 1' forward, 1' backward   Stair Management Assistance Not tested   Balance   Static Sitting Fair   Static Standing Fair -  (w RW)   Ambulatory Poor +  (w RW)   Endurance Deficit   Endurance Deficit Yes   Endurance Deficit Description pt limited by sx related to OH, fatigue   Activity Tolerance   Activity Tolerance Patient limited by fatigue;Treatment limited secondary to medical complications (Comment)  (orthostatic BP)   Medical Staff Made Aware MONICA Moreno   Nurse Made Aware ROXANA Long   Assessment   Prognosis Good   Problem List Decreased strength;Decreased range of motion;Decreased endurance; Impaired balance;Decreased mobility; Decreased coordination;Decreased skin integrity;Pain   Assessment Jay Lovell is a 69 y o  female admitted to [address] [place] [address] on 11/26/2019 for Primary osteoarthritis of right knee  Pt  has a past medical history of Arthritis, Diabetes ([facility] ), Endometrial ca ([facility] ) (2000), GERD (gastroesophageal reflux disease), Hard of hearing, Hyperlipidemia, Hypertension, Obesity, Stress incontinence, Swelling of both lower extremities, Thyroid disease, Use of cane as ambulatory aid, and Wears glasses    PT was consulted and pt was seen on 11/26/2019 for a high complexity PT EVALUATION   Pt presents with catheter, NC, PIV, fall risk precautions, WBAT RLE orders, monitoring of abn vitals, monitoring of abn labs, surgical incision of R knee  Pt lives with her dtr and son in law in a multi level home with 1 RAJAT, 6 steps to bed and 1/2 bath, 6+6+6 steps to bathroom (from bedroom level)  Prior to admission pt required modified independent assistance for transfers, elevations and ambulation using a [address] and received no assist for ADLs  Pt is currently functioning at a minimum assist x1 level for bed mobility (LE and line management), minimum assistance x1 level for transfers, minimum assistance x2 level for ambulation with Rolling Walker  Pt vitals recorded throughout session as 135/59 supine, 118/57 EOB, 107/54 supine end of session, 119/48 supine 2'  Amb distance limited by patient reports of lightheadedness and pt require mod Ax2 to return to bed for safety  Pt will benefit from continued skilled IP PT to address the above mentioned impairments  in order to maximize recovery and increase functional independence when completing mobility and ADLs  Currently PT recommendations for DME include RW (patient owns)  At this time PT recommendations for d/c are home w family support and OPPT vs STR pending progress of amb and stairs  End of session pt repositioned in bed, ice pack on R knee, SCDs donned, call bell and all needs in reach; dtr present throughout session and nsg updated  Barriers to Discharge Inaccessible home environment   Barriers to Discharge Comments RAJAT, steps within home   Goals   Patient Goals go home   STG Expiration Date 11/29/19   Short Term Goal #1 To be completed in 3 days: 1)  Pt will perform bed mobility with Fani demonstrating appropriate technique 100% of the time in order to improve function  2)  Perform all transfers with Fani demonstrating safe and appropriate technique 100% of the time in order to improve ability to negotiate safely in home environment  3) Amb with least restrictive AD > 200'x1 with mod I in order to demonstrate ability to negotiate in home environment  4)  Improve overall strength and balance 1/2 grade in order to optimize ability to perform functional tasks and reduce fall risk  5) Increase activity tolerance to 45 minutes in order to improve endurance to functional tasks  6)  Negotiate stairs using most appropriate technique and S in order to be able to negotiate safely in home environment  7) PT for ongoing patient and family/caregiver education, DME needs and d/c planning in order to promote highest level of function in least restrictive environment  PT Treatment Day 0   Plan   Treatment/Interventions Functional transfer training;LE strengthening/ROM;ADL retraining;Elevations; Therapeutic exercise; Endurance training;Patient/family training;Equipment eval/education; Bed mobility;Gait training; Compensatory technique education;Spoke to nursing;OT   PT Frequency Twice a day;7x/wk; Weekend   Recommendation   Recommendation Home with family support; Outpatient PT; Short-term skilled PT  (home w support vs STR pending progress)   Equipment Recommended Walker  (pt owns RW)   PT - OK to Discharge Yes  (to STR; no to home)   Additional Comments pending progress (stair trial, increase amb)   Modified [place] Scale   Modified [place] Scale 4   Barthel Index   Feeding 10   Bathing 0   Grooming Score 5   Dressing Score 5   Bladder Score 0   Bowels Score 10   Toilet Use Score 5   Transfers (Bed/Chair) Score 10   Mobility (Level Surface) Score 0   Stairs Score 0   Barthel Index Score 45   History: co - morbidities, age, fall risk, use of assistive device, assist for iadl's, multiple lines  Exam: impairments in systems including musculoskeletal (ROM, strength, posture), neuromuscular (balance,locomotion, gait, transfers, motor function and learning), joint integrity, integumentary (skin integrity, presence of scars or wounds), cardiac, pulmonary, cognition  Clinical: unstable/unpredictable  Complexity:high    Floy Farrow, PT

## 2019-11-26 NOTE — PLAN OF CARE
Problem: PAIN - ADULT  Goal: Verbalizes/displays adequate comfort level or baseline comfort level  Description  Interventions:  - Encourage patient to monitor pain and request assistance  - Assess pain using appropriate pain scale  - Administer analgesics based on type and severity of pain and evaluate response  - Implement non-pharmacological measures as appropriate and evaluate response  - Consider cultural and social influences on pain and pain management  - Notify physician/advanced practitioner if interventions unsuccessful or patient reports new pain  Outcome: Progressing     Problem: INFECTION - ADULT  Goal: Absence or prevention of progression during hospitalization  Description  INTERVENTIONS:  - Assess and monitor for signs and symptoms of infection  - Monitor lab/diagnostic results  - Monitor all insertion sites, i e  indwelling lines, tubes, and drains  - Monitor endotracheal if appropriate and nasal secretions for changes in amount and color  - [place] appropriate cooling/warming therapies per order  - Administer medications as ordered  - Instruct and encourage patient and family to use good hand hygiene technique  - Identify and instruct in appropriate isolation precautions for identified infection/condition  Outcome: Progressing     Problem: SAFETY ADULT  Goal: Patient will remain free of falls  Description  INTERVENTIONS:  - Assess patient frequently for physical needs  -  Identify cognitive and physical deficits and behaviors that affect risk of falls    -  [place] fall precautions as indicated by assessment   - Educate patient/family on patient safety including physical limitations  - Instruct patient to call for assistance with activity based on assessment  - Modify environment to reduce risk of injury  - Consider OT/PT consult to assist with strengthening/mobility  Outcome: Progressing  Goal: Maintain or return to baseline ADL function  Description  INTERVENTIONS:  -  Assess patient's ability to carry out ADLs; assess patient's baseline for ADL function and identify physical deficits which impact ability to perform ADLs (bathing, care of mouth/teeth, toileting, grooming, dressing, etc )  - Assess/evaluate cause of self-care deficits   - Assess range of motion  - Assess patient's mobility; develop plan if impaired  - Assess patient's need for assistive devices and provide as appropriate  - Encourage maximum independence but intervene and supervise when necessary  - Involve family in performance of ADLs  - Assess for home care needs following discharge   - Consider OT consult to assist with ADL evaluation and planning for discharge  - Provide patient education as appropriate  Outcome: Progressing  Goal: Maintain or return mobility status to optimal level  Description  INTERVENTIONS:  - Assess patient's baseline mobility status (ambulation, transfers, stairs, etc )    - Identify cognitive and physical deficits and behaviors that affect mobility  - Identify mobility aids required to assist with transfers and/or ambulation (gait belt, sit-to-stand, lift, walker, cane, etc )  - [place] fall precautions as indicated by assessment  - Record patient progress and toleration of activity level on Mobility SBAR; progress patient to next Phase/Stage  - Instruct patient to call for assistance with activity based on assessment  - Consider rehabilitation consult to assist with strengthening/weightbearing, etc   Outcome: Progressing     Problem: DISCHARGE PLANNING  Goal: Discharge to home or other facility with appropriate resources  Description  INTERVENTIONS:  - Identify barriers to discharge w/patient and caregiver  - Arrange for needed discharge resources and transportation as appropriate  - Identify discharge learning needs (meds, wound care, etc )  - Arrange for interpretive services to assist at discharge as needed  - Refer to Case Management Department for coordinating discharge planning if the patient needs post-hospital services based on physician/advanced practitioner order or complex needs related to functional status, cognitive ability, or social support system  Outcome: Progressing

## 2019-11-26 NOTE — OCCUPATIONAL THERAPY NOTE
[address] Evaluation     Patient Name: Patsy Dominguez  LGRNK'D Date: 2019  Problem List  Principal Problem:    Primary osteoarthritis of right knee  Active Problems:    Diabetes mellitus type 2, controlled ([facility] )    Hypothyroidism    Benign essential HTN    Hyperlipidemia    Morbid obesity ([facility] )    Past Medical History  Past Medical History:   Diagnosis Date    Arthritis     Diabetes ([facility] )     Endometrial ca ([facility] )     GERD (gastroesophageal reflux disease)     Hard of hearing     Hyperlipidemia     Hypertension     Obesity     Stress incontinence     Swelling of both lower extremities     Thyroid disease     hypo    Use of cane as ambulatory aid     Wears glasses      Past Surgical History  Past Surgical History:   Procedure Laterality Date    CATARACT EXTRACTION Bilateral      SECTION      COLONOSCOPY  02/15/2016    small polyp, repeat 5yrs    EGD      HYSTERECTOMY      age 48    OOPHORECTOMY      both removed age 48           19 [id number]   Note Type   Note type Eval only   Restrictions/Precautions   Weight Bearing Precautions Per Order Yes   RLE Weight Bearing Per Order WBAT   Other Precautions Fall Risk;Pain;WBS;Multiple lines;O2   Pain Assessment   Pain Assessment 0-10   Pain Score 3   Pain Type Surgical pain   Pain Location Knee   Pain Orientation Right   Hospital Pain Intervention(s) Cold applied;Repositioned; Ambulation/increased activity; Emotional support   Response to Interventions Tolerated OT   Multiple Pain Sites No   Home Living   Type of Home House   Home Layout Multi-level  (1 RAJAT; 6 steps down to bedroom)   Bathroom Shower/Tub Tub/shower unit   Bathroom Toilet Raised   Bathroom Equipment Shower chair;Commode   Bathroom Accessibility Accessible   Home Equipment Walker;Cane   Additional Comments Pt lives with dtr and son-in-law in a multi-level house with 1 RAJAT and 6 steps down to bedroom and 1/2 bath  From bedroom, pt must take 6+6+6 steps to full bath  Prior Function   Level of Park Independent with ADLs and functional mobility   Lives With Daughter;Family  (dtr, son-in-law)   Receives Help From Family   ADL Assistance Independent   IADLs Independent   Falls in the last 6 months 1 to 4  (1 fall per pt)   Vocational Retired   Comments At baseline, pt was I w/ ADLs, IADLs, and functional mobility/transfers w/ use of SPC and reports 1 fall PTA  Lifestyle   Autonomy At baseline, pt was I w/ ADLs, IADLs, and functional mobility/transfers w/ use of SPC and reports 1 fall PTA  Reciprocal Relationships Dtrs, son-in-law   Service to Others Retired   [employer] Volunteer work at [employer] ([address]) W[employer]   ADL   Where Assessed Edge of bed   [address] 6  Modified independent   [address] 5  [address]  5  [address]  4  [address] 5  [address] 3  [place] [place] [address]  3  Moderate [address] 3  Moderate Assistance   Bed Mobility   Rolling L 4  Minimal assistance   Additional items Assist x 1;Bedrails; Increased time required;Verbal cues;LE management   Supine to Sit 4  Minimal assistance   Additional items Assist x 1;HOB elevated; Bedrails; Increased time required;Verbal cues;LE management   Sit to Supine 3  Moderate assistance   Additional items Assist x 2; Increased time required;Verbal cues;LE management  (Assist x2 2* dizziness)   Additional Comments Pt lying supine: 135/59, BP seated EOB: 118/57  No c/o dizziness or lightheadedness seated EOB  However, after transfer/mobility trials (see below) pt with reports of increased dizziness, requiring Mod A of 2 for sit>supine for optimal pt safety  Upon supine position, BP: 107/54  Pt lying supine at end of session with call bell and phone within reach  BP at end of session: 119/48   ROXANA Long, made aware  All needs met and pt reports no further questions for OT at this time   Transfers   Sit to Stand 4  Minimal assistance   Additional items Assist x 1; Increased time required;Verbal cues   Stand to Sit 4  Minimal assistance   Additional items Assist x 1; Increased time required;Verbal cues   Additional Comments Cues for safe technique and hand placement   Functional Mobility   Functional Mobility 4  Minimal assistance   Additional Comments Assist x2; Pt reporting increased dizziness during mobility, requiring quick transition to EOB and supine position with Assist x2 for optimal pt safety   Additional items Rolling walker   Balance   Static Sitting Fair   Dynamic Sitting Fair -   Static Standing Fair -   Dynamic Standing Poor +   Ambulatory Poor +   Activity Tolerance   Activity Tolerance Patient limited by fatigue;Patient limited by pain;Treatment limited secondary to medical complications (Comment)  (orthostatic BP)   Medical Staff Made Wolf Limon, PT   Nurse Made Aware yes; ROXANA Long   RUE Assessment   RUE Assessment WFL   RUE Strength   RUE Overall Strength Within Functional Limits - able to perform ADL tasks with strength  (4/5 throughout)   LUE Assessment   LUE Assessment WFL   LUE Strength   LUE Overall Strength Within Functional Limits - able to perform ADL tasks with strength  (4/5 throughout)   Hand Function   Gross Motor Coordination Functional   Fine Motor Coordination Functional   Sensation   Light Touch No apparent deficits   Proprioception   Proprioception No apparent deficits   Vision - Complex Assessment   Ocular Range of Motion WFL   Acuity Able to read clock/calendar on wall without difficulty   Perception   Inattention/Neglect Appears intact   Cognition   Overall Cognitive Status [facility]   Arousal/Participation Alert; Cooperative   Attention Within functional limits   Orientation Level Oriented X4   Memory Within functional limits   Following Commands Follows multistep commands with increased time or repetition   Comments Pt pleasant and cooperative; Engages in converstion appropriately   Assessment   Limitation Decreased ADL status; Decreased UE strength;Decreased endurance;Decreased self-care trans;Decreased high-level ADLs   Prognosis Good   Assessment Pt is a 69 y o  female seen for OT evaluation s/p adm [facility] 11/26/2019 s/p R total knee arthroplasty  Pt w/ orders for WBAT R LE  Comorbidities affecting pts functional performance include a significant PMH of Arthritis, Diabetes, Endometrial CA, HLD, HTN, Obesity, Stress incontinence, and Hypothyroidism  Pt with active OT orders and activity orders for Activity Beginning POD #0  Pt lives with dtr and son-in-law in a multi-level house with 1 RAJAT and 6 steps down to bedroom and 1/2 bath  From bedroom, pt must take 6+6+6 steps to full bath  At baseline, pt was I w/ ADLs, IADLs, and functional mobility/transfers w/ use of SPC and reports 1 fall PTA  Upon evaluation, pt currently requires Supervision for UB ADLs, Mod A for LB ADLs, Mod A for toileting, Mod A of 2 for bed mobility, Min A for transfers, and Min A of 2 for functional mobility 2* the following deficits impacting occupational performance: weakness, decreased strength, decreased balance, decreased tolerance and increased pain  These impairments, as well at pts steps to enter environment, difficulty performing ADLS and difficulty performing IADLS  limit pts ability to safely engage in all baseline areas of occupation  Pt scored overall 40/100 on the Barthel Index   Pt to continue to benefit from continued acute OT services during hospital stay to address defined deficits and to maximize level of functional independence in the following Occupational Performance areas: grooming, bathing/shower, toilet hygiene, dressing, medication management, health maintenance, functional mobility, community mobility, clothing management, cleaning, meal prep and household maintenance  From OT standpoint, recommend Home OT upon D/C, however will continue to monitor  OT will continue to follow pt 3-5x/wk to address the following goals to  w/in 7-10 days:   Goals   Patient Goals To go home   LTG Time Frame 7-10   Long Term Goal Please refer to LTGs listed below   Plan   Treatment Interventions ADL retraining;Functional transfer training;UE strengthening/ROM; Endurance training;Patient/family training;Equipment evaluation/education; Compensatory technique education; Energy conservation; Activityengagement   Goal Expiration Date 12/10/19   OT Treatment Day 0   OT Frequency 3-5x/wk   Recommendation   OT Discharge Recommendation Home OT  (Will continue to monitor pending progress)   Barthel Index   Feeding 10   Bathing 0   Grooming Score 5   Dressing Score 5   Bladder Score 0   Bowels Score 10   Toilet Use Score 5   Transfers (Bed/Chair) Score 5   Mobility (Level Surface) Score 0   Stairs Score 0   Barthel Index Score 40   Modified Windber Scale   Modified Windber Scale 4        GOALS    1) Pt will improve activity tolerance to G for min 30 min txment sessions for increase engagement in functional tasks    2) Pt will complete bed mobility at a Mod I level w/ G balance/safety demonstrated to decrease caregiver assistance required     3) Pt will complete UB/LB dressing/self care w/ mod I using adaptive device and DME as needed    4) Pt will complete bathing w/ Mod I w/ use of AE and DME as needed    5) Pt will complete toileting w/ mod I w/ G hygiene/thoroughness using DME as needed    6) Pt will improve functional transfers to Mod I on/off all surfaces using DME as needed w/ G balance/safety     7) Pt will improve functional mobility during ADL/IADL/leisure tasks to Mod I using DME as needed w/ G balance/safety     8) Pt will be attentive 100% of the time during ongoing cognitive assessment w/ G participation to assist w/ safe d/c planning/recommendations    9) Pt will demonstrate G carryover of pt/caregiver education and training as appropriate w/o cues w/ good tolerance to increase safety during functional tasks    10) Pt will demonstrate 100% carryover of energy conservation techniques t/o functional I/ADL/leisure tasks w/o cues s/p skilled education to increase endurance during functional tasks    11) Pt will participate in simulated IADL management task to increase independence to Mod I w/ G safety and endurance       Black Decker, OTR/L

## 2019-11-27 LAB
ANION GAP SERPL CALCULATED.3IONS-SCNC: 7 MMOL/L (ref 4–13)
BUN SERPL-MCNC: 15 MG/DL (ref 5–25)
CALCIUM SERPL-MCNC: 8.8 MG/DL (ref 8.3–10.1)
CHLORIDE SERPL-SCNC: 110 MMOL/L (ref 100–108)
CO2 SERPL-SCNC: 23 MMOL/L (ref 21–32)
CREAT SERPL-MCNC: 0.81 MG/DL (ref 0.6–1.3)
ERYTHROCYTE [DISTWIDTH] IN BLOOD BY AUTOMATED COUNT: 15.3 % (ref 11.6–15.1)
GFR SERPL CREATININE-BSD FRML MDRD: 73 ML/MIN/1.73SQ M
GLUCOSE SERPL-MCNC: 110 MG/DL (ref 65–140)
GLUCOSE SERPL-MCNC: 136 MG/DL (ref 65–140)
GLUCOSE SERPL-MCNC: 148 MG/DL (ref 65–140)
GLUCOSE SERPL-MCNC: 165 MG/DL (ref 65–140)
GLUCOSE SERPL-MCNC: 175 MG/DL (ref 65–140)
HCT VFR BLD AUTO: 36.3 % (ref 34.8–46.1)
HGB BLD-MCNC: 11.6 G/DL (ref 11.5–15.4)
MCH RBC QN AUTO: 27.7 PG (ref 26.8–34.3)
MCHC RBC AUTO-ENTMCNC: 32 G/DL (ref 31.4–37.4)
MCV RBC AUTO: 87 FL (ref 82–98)
PLATELET # BLD AUTO: 277 THOUSANDS/UL (ref 149–390)
PMV BLD AUTO: 10.8 FL (ref 8.9–12.7)
POTASSIUM SERPL-SCNC: 4.4 MMOL/L (ref 3.5–5.3)
RBC # BLD AUTO: 4.19 MILLION/UL (ref 3.81–5.12)
SODIUM SERPL-SCNC: 140 MMOL/L (ref 136–145)
WBC # BLD AUTO: 10.93 THOUSAND/UL (ref 4.31–10.16)

## 2019-11-27 PROCEDURE — 97116 GAIT TRAINING THERAPY: CPT

## 2019-11-27 PROCEDURE — 99024 POSTOP FOLLOW-UP VISIT: CPT | Performed by: ORTHOPAEDIC SURGERY

## 2019-11-27 PROCEDURE — 82948 REAGENT STRIP/BLOOD GLUCOSE: CPT

## 2019-11-27 PROCEDURE — 80048 BASIC METABOLIC PNL TOTAL CA: CPT | Performed by: PHYSICIAN ASSISTANT

## 2019-11-27 PROCEDURE — 97530 THERAPEUTIC ACTIVITIES: CPT

## 2019-11-27 PROCEDURE — 97535 SELF CARE MNGMENT TRAINING: CPT

## 2019-11-27 PROCEDURE — 99232 SBSQ HOSP IP/OBS MODERATE 35: CPT | Performed by: FAMILY MEDICINE

## 2019-11-27 PROCEDURE — 85027 COMPLETE CBC AUTOMATED: CPT | Performed by: PHYSICIAN ASSISTANT

## 2019-11-27 PROCEDURE — 97110 THERAPEUTIC EXERCISES: CPT

## 2019-11-27 RX ORDER — OXYCODONE HYDROCHLORIDE 10 MG/1
10 TABLET ORAL
Status: DISCONTINUED | OUTPATIENT
Start: 2019-11-27 | End: 2019-11-28 | Stop reason: HOSPADM

## 2019-11-27 RX ORDER — OXYCODONE HYDROCHLORIDE 5 MG/1
5 TABLET ORAL EVERY 4 HOURS PRN
Qty: 60 TABLET | Refills: 0 | Status: SHIPPED | OUTPATIENT
Start: 2019-11-27 | End: 2019-12-07

## 2019-11-27 RX ORDER — HYDROMORPHONE HCL/PF 1 MG/ML
0.2 SYRINGE (ML) INJECTION EVERY 4 HOURS PRN
Status: DISCONTINUED | OUTPATIENT
Start: 2019-11-27 | End: 2019-11-28 | Stop reason: HOSPADM

## 2019-11-27 RX ORDER — OXYCODONE HYDROCHLORIDE 5 MG/1
5 TABLET ORAL
Status: DISCONTINUED | OUTPATIENT
Start: 2019-11-27 | End: 2019-11-28 | Stop reason: HOSPADM

## 2019-11-27 RX ORDER — GABAPENTIN 100 MG/1
100 CAPSULE ORAL 2 TIMES DAILY
Qty: 60 CAPSULE | Refills: 0 | Status: SHIPPED | OUTPATIENT
Start: 2019-11-27 | End: 2020-01-28

## 2019-11-27 RX ADMIN — SENNOSIDES 8.6 MG: 8.6 TABLET, FILM COATED ORAL at 08:08

## 2019-11-27 RX ADMIN — Medication 1 TABLET: at 08:09

## 2019-11-27 RX ADMIN — FERROUS SULFATE TAB 325 MG (65 MG ELEMENTAL FE) 325 MG: 325 (65 FE) TAB at 08:09

## 2019-11-27 RX ADMIN — PRAVASTATIN SODIUM 40 MG: 40 TABLET ORAL at 17:27

## 2019-11-27 RX ADMIN — INSULIN LISPRO 1 UNITS: 100 INJECTION, SOLUTION INTRAVENOUS; SUBCUTANEOUS at 21:43

## 2019-11-27 RX ADMIN — PANTOPRAZOLE SODIUM 40 MG: 40 TABLET, DELAYED RELEASE ORAL at 06:08

## 2019-11-27 RX ADMIN — INSULIN LISPRO 1 UNITS: 100 INJECTION, SOLUTION INTRAVENOUS; SUBCUTANEOUS at 17:27

## 2019-11-27 RX ADMIN — OXYCODONE HYDROCHLORIDE AND ACETAMINOPHEN 500 MG: 500 TABLET ORAL at 17:27

## 2019-11-27 RX ADMIN — IRON SUCROSE 300 MG: 20 INJECTION, SOLUTION INTRAVENOUS at 09:51

## 2019-11-27 RX ADMIN — DOCUSATE SODIUM 100 MG: 100 CAPSULE, LIQUID FILLED ORAL at 08:08

## 2019-11-27 RX ADMIN — OXYCODONE HYDROCHLORIDE 10 MG: 10 TABLET ORAL at 11:23

## 2019-11-27 RX ADMIN — MORPHINE SULFATE 4 MG: 4 INJECTION INTRAVENOUS at 09:45

## 2019-11-27 RX ADMIN — FERROUS SULFATE TAB 325 MG (65 MG ELEMENTAL FE) 325 MG: 325 (65 FE) TAB at 17:27

## 2019-11-27 RX ADMIN — OXYCODONE HYDROCHLORIDE 5 MG: 5 TABLET ORAL at 06:08

## 2019-11-27 RX ADMIN — OXYCODONE HYDROCHLORIDE AND ACETAMINOPHEN 500 MG: 500 TABLET ORAL at 08:09

## 2019-11-27 RX ADMIN — LEVOTHYROXINE SODIUM 50 MCG: 50 TABLET ORAL at 06:08

## 2019-11-27 RX ADMIN — FOLIC ACID 1 MG: 1 TABLET ORAL at 08:09

## 2019-11-27 RX ADMIN — DOCUSATE SODIUM 100 MG: 100 CAPSULE, LIQUID FILLED ORAL at 17:27

## 2019-11-27 RX ADMIN — LISINOPRIL 20 MG: 20 TABLET ORAL at 08:09

## 2019-11-27 RX ADMIN — OXYCODONE HYDROCHLORIDE 10 MG: 10 TABLET ORAL at 20:02

## 2019-11-27 RX ADMIN — ENOXAPARIN SODIUM 40 MG: 40 INJECTION SUBCUTANEOUS at 08:08

## 2019-11-27 NOTE — ASSESSMENT & PLAN NOTE
Patient is POD#1 status post right total knee arthroplasty due to osteoarthritis  Patient is currently doing well post surgery  Continue pain control as per Orthopedic Service, encourage incentive spirometry use, out of bed to chair, DC Romeo catheter once patient able to use commode and okay with Orthopedic surgery  Continue DVT prophylaxis with Lovenox 40 mg subcutaneously daily  PT/OT  Further dispo to be determined by primary service

## 2019-11-27 NOTE — UTILIZATION REVIEW
Initial Clinical Review    Elective    IP     surgical procedure    Age/Sex: 69 y o  female     Surgery Date:    11/26/2019    Procedure: ARTHROPLASTY KNEE TOTAL (Right)       Anesthesia:   spinal        Admission Orders: Date/Time/Statement: Inpatient Admission Orders (From admission, onward)     Ordered        11/26/19 1118  Inpatient Admission  Once                   Orders Placed This Encounter   Procedures    Inpatient Admission     Standing Status:   Standing     Number of Occurrences:   1     Order Specific Question:   Admitting Physician     Answer:   Alexei Atkinson [[id number]]     Order Specific Question:   Level of Care     Answer:   Med Surg [[id number]]     Order Specific Question:   Estimated length of stay     Answer:   More than 2 Midnights     Order Specific Question:   Certification     Answer:   I certify that inpatient services are medically necessary for this patient for a duration of greater than two midnights  See H&P and MD Progress Notes for additional information about the patient's course of treatment       Vital Signs: /51 (BP Location: Right arm)   Pulse 82   Temp 99 8 °F (37 7 °C) (Tympanic)   Resp 18   Ht 4' 11" (1 499 m)   Wt 89 8 kg (198 lb)   SpO2 93%   BMI 39 99 kg/m²      Diet:    Reg    Mobility:    PT/OT    DVT Prophylaxis:   SCD'S    Medications/Pain Control:   Scheduled Medications:    Medications:  ascorbic acid 500 mg Oral BID   docusate sodium 100 mg Oral BID   enoxaparin 40 mg Subcutaneous Daily   ferrous sulfate 325 mg Oral BID With Meals   folic acid 1 mg Oral Daily   insulin lispro 1-5 Units Subcutaneous 4x Daily (AC & HS)   iron sucrose 300 mg Intravenous Daily   levothyroxine 50 mcg Oral Once per day on Mon Tue Wed Thu Fri   [START ON 11/30/2019] levothyroxine 75 mcg Oral Once per day on Sun Sat   lisinopril 20 mg Oral Daily   multivitamin-minerals 1 tablet Oral Daily   pantoprazole 40 mg Oral Early Morning   pravastatin 40 mg Oral Daily With Dinner   senna 1 tablet Oral Daily     Continuous IV Infusions:    sodium chloride 100 mL/hr Intravenous Continuous     PRN Meds:    acetaminophen 650 mg Oral Q4H PRN   calcium carbonate 1,000 mg Oral Daily PRN   lactated ringers 1,000 mL Intravenous Once PRN   And      lactated ringers 1,000 mL Intravenous Once PRN   morphine injection 4 mg Intravenous Q4H PRN   X1  11/27  Thus far   oxyCODONE 10 mg Oral Q4H PRN   oxyCODONE 5 mg Oral Q4H PRN   sodium chloride 1,000 mL Intravenous Once PRN   And      sodium chloride 1,000 mL Intravenous Once PRN     Neurovascular checks  Q 4 hrs    Network Utilization Review Department  [email or web address]  ATTENTION: Please call with any questions or concerns to [phone] and carefully listen to the prompts so that you are directed to the right person  All voicemails are confidential   Caesar Osei all requests for admission clinical reviews, approved or denied determinations and any other requests to dedicated fax number below belonging to the campus where the patient is receiving treatment    FACILITY NAME UR FAX NUMBER   ADMISSION DENIALS (Administrative/Medical Necessity) [address] [facility] (Maternity/NICU/Pediatrics) [phone]   [facility] [address] [facility] [phone]   [address] [place] [address] [address] [facility] [phone]   [facility] [address] [facility] [address] [place] [address] [phone]

## 2019-11-27 NOTE — PHYSICAL THERAPY NOTE
Physical Therapy Progress Note     11/27/19 9658   Pain Assessment   Pain Assessment 0-10   Pain Score 5   Pain Type Surgical pain   Pain Location Knee   Pain Orientation Right   Hospital Pain Intervention(s) Ambulation/increased activity;Repositioned;Cold applied   Response to Interventions Tolerated  Restrictions/Precautions   Weight Bearing Precautions Per Order Yes   RLE Weight Bearing Per Order WBAT   Other Precautions WBS; Fall Risk;Pain   General   Chart Reviewed Yes   Response to Previous Treatment Patient reporting fatigue but able to participate  Family/Caregiver Present No   Subjective   Subjective Willing to participate in therapy this PM    Transfers   Sit to Stand 5  Supervision   Additional items Assist x 1; Armrests; Increased time required;Verbal cues   Stand to Sit 5  Supervision   Additional items Assist x 1; Armrests; Increased time required;Verbal cues   Ambulation/Elevation   Gait pattern Decreased foot clearance; Forward Flexion; Improper Weight shift; Antalgic;Decreased R stance; Short stride; Step to;Excessively slow   Gait Assistance 5  Supervision   Additional items Assist x 1; Tactile cues; Verbal cues   Assistive Device Rolling walker   Distance 25' x 2 with a standing resting period in between   Balance   Static Sitting Fair   Dynamic Sitting Fair -   Static Standing Fair -   Dynamic Standing Fair -   Ambulatory Fair -   Endurance Deficit   Endurance Deficit Yes   Endurance Deficit Description fatigue/pain   Activity Tolerance   Activity Tolerance Patient tolerated treatment well   Nurse Made Aware Yes   Exercises   TKR Sitting;Supine;10 reps;AAROM; Bilateral   Assessment   Prognosis Good   Problem List Decreased range of motion;Decreased strength;Decreased endurance; Impaired balance;Decreased mobility; Decreased skin integrity;Orthopedic restrictions;Pain;Obesity   Assessment Pt  seated in bedside chair upon my arrival  Pt  eager to participate in therapy this PM  Performance of HEP seated in bedside chair with cues provided for proper completion  Progressed with transfers being able to complete practicing standbyA of therapist with cues for hand placement/technique  Progressed with an increased amb  trial with use of RW and A of therapist with cues provided for LE sequencing  Pt  limited by fatigue requiring a standing resting period in between gait trials  Pt  returned to room and repositioned seated in bedside chair with ice applied at end of treatment session  Pt  will continue progression of PT goals with intent of d/c home with HHPT and family support as needed when medically stable  Barriers to Discharge Inaccessible home environment   Barriers to Discharge Comments RAJAT   Goals   Patient Goals To get better  STG Expiration Date 11/29/19   PT Treatment Day 2   Plan   Treatment/Interventions Functional transfer training;LE strengthening/ROM; Endurance training; Therapeutic exercise;Gait training;Spoke to nursing;Spoke to case management   Progress Progressing toward goals   PT Frequency 7x/wk; Twice a day;Weekend   Recommendation   Recommendation Home PT; Home with family support   Equipment Recommended Other (Comment)  (has DME at home  )   PT - OK to Discharge No  (Continued progression of PT goals prior to d/c )     Reinaldo Archuleta, PTA

## 2019-11-27 NOTE — SOCIAL WORK
CM met with pt at bedside to discuss discharge needs  Pt lives in a house with her dtr and son in law  ADL's are completed independently  Pt owns all recommended DME  Pt to go home with HHPT; OK with [facility]  Pt's dtr to transport home at discharge  No other needs expressed or identified  CM will continue to follow as needed  A post acute care recommendation was made by your care team for [facility]  Discussed [place] of Choice with patient  List of agencies given to patient via in person  patient aware the list is custom filtered for them by preferred and that [facility]'s post acute providers are designated

## 2019-11-27 NOTE — OCCUPATIONAL THERAPY NOTE
OccupationalTherapy Progress Note     Patient Name: Charles KAY Date: 11/27/2019  Problem List  Principal Problem:    Primary osteoarthritis of right knee  Active Problems:    Diabetes mellitus type 2, controlled ([facility] )    Hypothyroidism    Benign essential HTN    Hyperlipidemia    Morbid obesity ([facility] )          11/27/19 0917   Restrictions/Precautions   Weight Bearing Precautions Per Order Yes   RLE Weight Bearing Per Order WBAT   Other Precautions WBS; Fall Risk;Pain   General   Response to Previous Treatment Patient with no complaints from previous session   Lifestyle   Autonomy At baseline, pt was I w/ ADLs, IADLs, and functional mobility/transfers w/ use of SPC and reports 1 fall PTA  Reciprocal Relationships Dtrs, son-in-law   Service to Others Retired   Intrinsic Gratification Volunteer work at [employer]   Pain Assessment   Pain Assessment 0-10   Pain Score 7   Pain Type Surgical pain   Pain Location Knee   Pain Orientation Right   Pain Frequency Intermittent   Pain Onset Gradual   Clinical Progression Gradually worsening   Effect of Pain on Daily Activities Increased pain with activity, limiting overall activity tolerance   Patient's Stated Pain Goal No pain   Hospital Pain Intervention(s) Cold applied;Repositioned; Ambulation/increased activity; Emotional support; Rest   Response to Interventions Tolerated OT   Multiple Pain Sites No   ADL   Grooming Assistance 5  Supervision/Setup   Grooming Deficit Setup;Supervision/safety; Increased time to complete   Grooming Comments Standing at sink to wash/dry hands   LB Dressing Comments Educated pt on donning/doffing operative LE first for LB ADLs to increase independence in LB dressing tasks  Pt will benefit from further LB dressing assessment   Toileting Assistance  4  Minimal Assistance   Toileting Deficit Setup;Steadying;Verbal cueing;Supervison/safety; Increased time to complete;Perineal hygiene   Toileting Comments Toileting tasks completed with Min A with CGA for balance/steadying during perineal hygiene tasks   Functional Standing Tolerance   Time 4 mins   Activity Light grooming tasks standing at sink   Comments No LOBs noted, however increased fatigue demonstrated   Bed Mobility   Supine to Sit Unable to assess   Sit to Supine 3  Moderate assistance   Additional items Assist x 1;HOB elevated; Bedrails; Increased time required;Verbal cues;LE management   Additional Comments Pt lying supine at end of session with call bell and phone within reach  All needs met and pt reports no further questions for OT at this time   Transfers   Sit to Stand 5  Supervision   Additional items Armrests; Increased time required;Verbal cues   Stand to Sit 5  Supervision   Additional items Armrests; Increased time required;Verbal cues   Toilet transfer 4  Minimal assistance   Additional items Increased time required;Verbal cues; Commode   Additional Comments Cues for safe technique and placement of hands and operative LE   Functional Mobility   Functional Mobility 4  Minimal assistance   Additional Comments Assist x1   Additional items Rolling walker   Toilet Transfers   Toilet Transfer From Rolling walker   Toilet Transfer Type To and from   Toilet Transfer to Standard bedside commode   Toilet Transfer Technique Ambulating   Toilet Transfers Supervision;Verbal cues   Toilet Transfers Comments Grab bar use on L   Cognition   Overall Cognitive Status [facility]   Arousal/Participation Alert; Cooperative   Attention Within functional limits   Orientation Level Oriented X4   Memory Within functional limits   Following Commands Follows multistep commands with increased time or repetition   Activity Tolerance   Activity Tolerance Patient limited by fatigue;Patient limited by pain   Medical Staff Made Aware Kody Flood PTA; ROXANA Long   Assessment   Assessment Pt seen for OT treatment session this AM focusing on functional activity tolerance, bed mobility, ADLs, and functional mobility/transfers   Pt alert and cooperative throughout session  Pt seated OOB in chair at start of session  BP: 127/56  Transfers completed with Supervision with cues for safe technique and placement of hands  BP standin/76  Min A required for functional mobility with CGA for balance/steadying and cues for safe body positioning within RW  Toileting tasks completed with Min A with CGA for balance/steadying during perineal hygiene tasks  Light grooming tasks completed with Supervision while standing at sink to wash/dry hands  Pt with increased fatigue after functional mobility, requesting to return to supine position  Mod A required for sit>supine with increased assist for LE management and trunk control  Educated pt on donning/doffing operative LE first for LB ADLs to increase independence in LB dressing tasks  Pt will benefit from further LB dressing assessment  Pt lying supine at end of session with call bell and phone within reach  All needs met and pt reports no further questions for OT at this time  Continue to recommend Home OT when medically cleared  OT to follow pt on caseload  Plan   Treatment Interventions ADL retraining;Functional transfer training;UE strengthening/ROM; Endurance training;Patient/family training;Equipment evaluation/education; Compensatory technique education; Energy conservation; Activityengagement   Goal Expiration Date 12/10/19   OT Treatment Day 1   OT Frequency 3-5x/wk   Recommendation   OT Discharge Recommendation Home OT   Barthel Index   Feeding 10   Bathing 0   Grooming Score 5   Dressing Score 5   Bladder Score 10   Bowels Score 10   Toilet Use Score 5   Transfers (Bed/Chair) Score 10   Mobility (Level Surface) Score 0   Stairs Score 0   Barthel Index Score 55   Modified Mansfield Scale   Modified Mansfield Scale 4         Penny Parker OTR/L

## 2019-11-27 NOTE — ASSESSMENT & PLAN NOTE
Lab Results   Component Value Date    HGBA1C 7 0 (H) 11/06/2019       Recent Labs     11/26/19  1209 11/26/19  1655 11/26/19 2048 11/27/19  0625   POCGLU 113 195* 170* 136       Blood Sugar Average: Last 72 hrs:  (P) 145  4   Patient's current hemoglobin A1c 7 0 which indicates well-controlled type 2 diabetes mellitus  Patient's blood sugars are well controlled  Patient's home medications of Amaryl and Jardiance currently on hold    Continue sliding scale insulin, Accu-Chek while NPO, and can advance to diabetic diet once okay with Orthopedic surgery team

## 2019-11-27 NOTE — PLAN OF CARE
Problem: OCCUPATIONAL THERAPY ADULT  Goal: Performs self-care activities at highest level of function for planned discharge setting  See evaluation for individualized goals  Description  Treatment Interventions: ADL retraining, Functional transfer training, UE strengthening/ROM, Endurance training, Patient/family training, Equipment evaluation/education, Compensatory technique education, Energy conservation, Activityengagement          See flowsheet documentation for full assessment, interventions and recommendations  Outcome: Progressing  Note:   Limitation: Decreased ADL status, Decreased UE strength, Decreased endurance, Decreased self-care trans, Decreased high-level ADLs  Prognosis: Good  Assessment: Pt seen for OT treatment session this AM focusing on functional activity tolerance, bed mobility, ADLs, and functional mobility/transfers  Pt alert and cooperative throughout session  Pt seated OOB in chair at start of session  BP: 127/56  Transfers completed with Supervision with cues for safe technique and placement of hands  BP standin/76  Min A required for functional mobility with CGA for balance/steadying and cues for safe body positioning within RW  Toileting tasks completed with Min A with CGA for balance/steadying during perineal hygiene tasks  Light grooming tasks completed with Supervision while standing at sink to wash/dry hands  Pt with increased fatigue after functional mobility, requesting to return to supine position  Mod A required for sit>supine with increased assist for LE management and trunk control  Educated pt on donning/doffing operative LE first for LB ADLs to increase independence in LB dressing tasks  Pt will benefit from further LB dressing assessment  Pt lying supine at end of session with call bell and phone within reach  All needs met and pt reports no further questions for OT at this time  Continue to recommend Home OT when medically cleared  OT to follow pt on caseload  OT Discharge Recommendation: Home OT

## 2019-11-27 NOTE — PHYSICAL THERAPY NOTE
Physical Therapy Progress Note     11/27/19 0925   Pain Assessment   Pain Assessment 0-10   Pain Score 7   Pain Type Surgical pain   Pain Location Knee   Pain Orientation Right   Hospital Pain Intervention(s) Ambulation/increased activity;Repositioned;Cold applied   Response to Interventions Tolerated  Restrictions/Precautions   Weight Bearing Precautions Per Order Yes   RLE Weight Bearing Per Order WBAT   Other Precautions WBS; Fall Risk;Pain   General   Chart Reviewed Yes   Response to Previous Treatment Patient reporting fatigue but able to participate  Family/Caregiver Present No   Subjective   Subjective Willing to participate in therapy this AM    Bed Mobility   Sit to Supine 3  Moderate assistance   Additional items Assist x 1;Bedrails;HOB elevated;Leg ; Increased time required;LE management;Verbal cues   Transfers   Sit to Stand 5  Supervision   Additional items Assist x 1; Armrests; Increased time required;Verbal cues   Stand to Sit 5  Supervision   Additional items Assist x 1;Bedrails; Increased time required;Verbal cues   Toilet transfer 4  Minimal assistance   Additional items Assist x 1;Commode; Increased time required;Verbal cues   Ambulation/Elevation   Gait pattern Decreased foot clearance; Forward Flexion; Improper Weight shift;Decreased R stance   Gait Assistance 4  Minimal assist   Additional items Assist x 1;Verbal cues; Tactile cues   Assistive Device Rolling walker   Distance 15' x 2 with a seated toileting break in between   Balance   Static Sitting Fair   Dynamic Sitting Fair -   Static Standing Fair -   Dynamic Standing Poor +   Ambulatory Poor +   Endurance Deficit   Endurance Deficit Yes   Endurance Deficit Description fatigue/pain   Activity Tolerance   Activity Tolerance Patient limited by fatigue;Patient limited by pain   Medical Staff [address] Sue Albert 79    Nurse Made Aware Yes   Exercises   TKR Sitting;10 reps;AAROM; Bilateral   Assessment   Prognosis Good   Problem List Decreased range of motion;Decreased strength;Decreased endurance; Impaired balance;Decreased mobility; Decreased skin integrity;Orthopedic restrictions;Pain;Obesity   Assessment Pt  seated in bedside chair upon my arrival  Pt  reporting fatigue, however agreeable to therapeutic intervention  Performance of HEP seated in bedside chair with cues provided for proper completion  Progressed with transfers requiring A of therapist with cues for hand placement/technique  Progressed with amb  trials x 2 with use of RW and A of therapist with cues provided for LE sequencing  Pt  required a seated toileting break in between gait trials  Pt  returned to supine in bed at end of treatment session with cold applied at end of treatment session  Pt  will continue progression of PT goals with intent of d/c home with HHPT and family support as needed when medically stable  Barriers to Discharge Inaccessible home environment   Barriers to Discharge Comments RAJAT   Goals   Patient Goals To get better  STG Expiration Date 11/29/19   PT Treatment Day 1   Plan   Treatment/Interventions Functional transfer training;LE strengthening/ROM; Endurance training; Therapeutic exercise; Bed mobility;Gait training;Spoke to nursing;Spoke to case management;OT   Progress Slow progress, decreased activity tolerance   PT Frequency Twice a day;7x/wk; Weekend   Recommendation   Recommendation Home PT; Home with family support   PT - OK to Discharge No  (Continued progression of PT goals prior to d/c )     Ana Vargas, PTA

## 2019-11-27 NOTE — PLAN OF CARE
Problem: PHYSICAL THERAPY ADULT  Goal: Performs mobility at highest level of function for planned discharge setting  See evaluation for individualized goals  Description  Treatment/Interventions: Functional transfer training, LE strengthening/ROM, ADL retraining, Elevations, Therapeutic exercise, Endurance training, Patient/family training, Equipment eval/education, Bed mobility, Gait training, Compensatory technique education, Spoke to nursing, OT  Equipment Recommended: Walker(pt owns RW)       See flowsheet documentation for full assessment, interventions and recommendations  11/27/2019 1506 by Genaro Denise PTA  Outcome: Progressing  Note:   Prognosis: Good  Problem List: Decreased range of motion, Decreased strength, Decreased endurance, Impaired balance, Decreased mobility, Decreased skin integrity, Orthopedic restrictions, Pain, Obesity  Assessment: Pt  seated in bedside chair upon my arrival  Pt  eager to participate in therapy this PM  Performance of HEP seated in bedside chair with cues provided for proper completion  Progressed with transfers being able to complete practicing standbyA of therapist with cues for hand placement/technique  Progressed with an increased amb  trial with use of RW and A of therapist with cues provided for LE sequencing  Pt  limited by fatigue requiring a standing resting period in between gait trials  Pt  returned to room and repositioned seated in bedside chair with ice applied at end of treatment session  Pt  will continue progression of PT goals with intent of d/c home with HHPT and family support as needed when medically stable  Barriers to Discharge: Inaccessible home environment  Barriers to Discharge Comments: RAJAT  Recommendation: Home PT, Home with family support     PT - OK to Discharge: No(Continued progression of PT goals prior to d/c )    See flowsheet documentation for full assessment       11/27/2019 1325 by Genaro Denise PTA  Outcome: Progressing  Note: Prognosis: Good  Problem List: Decreased range of motion, Decreased strength, Decreased endurance, Impaired balance, Decreased mobility, Decreased skin integrity, Orthopedic restrictions, Pain, Obesity  Assessment: Pt  seated in bedside chair upon my arrival  Pt  reporting fatigue, however agreeable to therapeutic intervention  Performance of HEP seated in bedside chair with cues provided for proper completion  Progressed with transfers requiring A of therapist with cues for hand placement/technique  Progressed with amb  trials x 2 with use of RW and A of therapist with cues provided for LE sequencing  Pt  required a seated toileting break in between gait trials  Pt  returned to supine in bed at end of treatment session with cold applied at end of treatment session  Pt  will continue progression of PT goals with intent of d/c home with HHPT and family support as needed when medically stable  Barriers to Discharge: Inaccessible home environment  Barriers to Discharge Comments: RAJAT  Recommendation: Home PT, Home with family support     PT - OK to Discharge: No(Continued progression of PT goals prior to d/c )    See flowsheet documentation for full assessment

## 2019-11-27 NOTE — TELEPHONE ENCOUNTER
Preoperative Elective Admission Assessment- Spoke with pt using  #[id number]       Living Situation: Pt reports she lives with her daughter(Tonya) and son-in-law(Everett) in a multi-level home  Pt reports they both will be caregivers post-op  Home Layout: Multi-level home with a step-in tub                       Steps: #1 step to enter and then down #6 more steps to living level with 1/2 bath  First Floor Setup: no     Post-op Caregiver: daughter(Tonya) and son-in-law(Everett)     Post-op Transport: daughter(Tonya) and son-in-law(Everett)     Outpatient Physical Therapy Site: [facility]    DME: Pt has a BSC, cane and walker without wheels  Pt denies having a RW  Patient's Current Level of Function: Pt currently ambulates with a cane and is independent with her ADLs  Medication Management: Pt self manages her meds using a pillbox                      Preferred Pharmacy: [facility] in [place] [id number]                    Blood Management Vitamins: Pt confirms she is taking her oral iron, MV, folic acid and vitamin C                     Post-op anticoagulant: TBD by surgeon    DC Plan:  Patient plans for discharge to home with plans to attend outpatient physical therapy                    Barriers to DC identified preoperatively: None    BMI: 39 95 at 11/15 OV    Caresense: Pt declined                     RAPT: Score 7 already in caresense                    ACE/ARB Form: NA GFR 68                    HOOS/KOOS: 44 905 already in caresense    Patient Education:  Pt educated on post-op pain, early mobilization (POD0), indication for/use of incentive spirometer (10x/hour while awake) and indication for/use of foot/leg pumps (18 hours/day)  I educated patient on the many benefits of outpt PT(Including maintaining independence, additional resources at outpt site, better outcomes etc  )  Also educated on how home PT vs  outpt PT is determined (while inpt)   educated that our goal, if at all possible, is to appropriately discharge patient based off their post-op function while striving to maintain maximal independence  If possible, the goal is to discharge patient to home and for them to attend outpatient physical therapy  Pt denies having questions at this time  Pt encouraged to call me with any questions, concerns or issues

## 2019-11-27 NOTE — PROGRESS NOTES
Progress Note - Orthopedics   Chichi Blanchard 69 y o  female MRN: [id number]  Unit/Bed#: E2 -01 Encounter: [id number]    Assessment:  69 y o  female s/p right total knee arthroplasty POD 1    Plan:  Pain control prn- adjusted pain regimen, d/w nurse, hold for sedation  Bowel regimen prn  D/w nurse to cut top of ISAAK stocking  Med mgt per SLIM- appreciate input  PT/OT- WBAT right LE   Lovenox/TEDs/SCDs for DVT prophylaxis- will go home on lovenox x 4 weeks  Abx x 24h    Subjective: Pt S&E  Admits pain 7/10  Just received IV morphine  Has not had oral medications since 7:00 a m  [place] Denies SOB, abdominal pain  Admits flatus  Denies BM  Vitals: Blood pressure 119/51, pulse 82, temperature 99 8 °F (37 7 °C), temperature source Tympanic, resp  rate 18, height 4' 11" (1 499 m), weight 89 8 kg (198 lb), SpO2 93 %, not currently breastfeeding  ,Body mass index is 39 99 kg/m²        Intake/Output Summary (Last 24 hours) at 11/27/2019 1121  Last data filed at 11/27/2019 1002  Gross per 24 hour   Intake 815 ml   Output 1485 ml   Net -670 ml       Invasive Devices     Peripheral Intravenous Line            Peripheral IV 11/26/19 Left Forearm 1 day          Airway            Non-Surgical Airway Oral pharyngeal airway 80 mm 1 day                Ortho Exam: rightLE:  Drsg:  C/D/I, sensation grossly intact L4, L5, S1, palpable pedal pulse, EHL/AT/GS intact    Lab, Imaging and other studies:   CBC:   Lab Results   Component Value Date    WBC 10 93 (H) 11/27/2019    HGB 11 6 11/27/2019    HCT 36 3 11/27/2019    MCV 87 11/27/2019     11/27/2019    MCH 27 7 11/27/2019    MCHC 32 0 11/27/2019    RDW 15 3 (H) 11/27/2019    MPV 10 8 11/27/2019     CMP:   Lab Results   Component Value Date    SODIUM 140 11/27/2019     (H) 11/27/2019    CO2 23 11/27/2019    BUN 15 11/27/2019    CREATININE 0 81 11/27/2019    CALCIUM 8 8 11/27/2019    EGFR 73 11/27/2019

## 2019-11-27 NOTE — PLAN OF CARE
Problem: PHYSICAL THERAPY ADULT  Goal: Performs mobility at highest level of function for planned discharge setting  See evaluation for individualized goals  Description  Treatment/Interventions: Functional transfer training, LE strengthening/ROM, ADL retraining, Elevations, Therapeutic exercise, Endurance training, Patient/family training, Equipment eval/education, Bed mobility, Gait training, Compensatory technique education, Spoke to nursing, OT  Equipment Recommended: Walker(pt owns RW)       See flowsheet documentation for full assessment, interventions and recommendations  Outcome: Progressing  Note:   Prognosis: Good  Problem List: Decreased range of motion, Decreased strength, Decreased endurance, Impaired balance, Decreased mobility, Decreased skin integrity, Orthopedic restrictions, Pain, Obesity  Assessment: Pt  seated in bedside chair upon my arrival  Pt  reporting fatigue, however agreeable to therapeutic intervention  Performance of HEP seated in bedside chair with cues provided for proper completion  Progressed with transfers requiring A of therapist with cues for hand placement/technique  Progressed with amb  trials x 2 with use of RW and A of therapist with cues provided for LE sequencing  Pt  required a seated toileting break in between gait trials  Pt  returned to supine in bed at end of treatment session with cold applied at end of treatment session  Pt  will continue progression of PT goals with intent of d/c home with HHPT and family support as needed when medically stable  Barriers to Discharge: Inaccessible home environment  Barriers to Discharge Comments: RAJAT  Recommendation: Home PT, Home with family support     PT - OK to Discharge: No(Continued progression of PT goals prior to d/c )    See flowsheet documentation for full assessment

## 2019-11-27 NOTE — PROGRESS NOTES
Progress Note - Charles Lizama 1945, 69 y o  female MRN: [id number]    Unit/Bed#: E2 -01 Encounter: [id number]    Primary Care Provider: Karyn Snyder MD   Date and time admitted to hospital: 11/26/2019  5:21 AM        * Primary osteoarthritis of right knee  Assessment & Plan  Patient is POD#1 status post right total knee arthroplasty due to osteoarthritis  Patient is currently doing well post surgery  Continue pain control as per Orthopedic Service, encourage incentive spirometry use, out of bed to chair, DC Romeo catheter once patient able to use commode and okay with Orthopedic surgery  Continue DVT prophylaxis with Lovenox 40 mg subcutaneously daily  PT/OT  Further dispo to be determined by primary service  Benign essential HTN  Assessment & Plan  BP is currently stable and optimal   Continue lisinopril substituted for enalapril  Diabetes mellitus type 2, controlled [facility])  Assessment & Plan  Lab Results   Component Value Date    HGBA1C 7 0 (H) 11/06/2019       Recent Labs     11/26/19  1209 11/26/19  1655 11/26/19  2048 11/27/19  0625   POCGLU 113 195* 170* 136       Blood Sugar Average: Last 72 hrs:  (P) 145  4   Patient's current hemoglobin A1c 7 0 which indicates well-controlled type 2 diabetes mellitus  Patient's blood sugars are well controlled  Patient's home medications of Amaryl and Jardiance currently on hold  Continue sliding scale insulin, Accu-Chek while NPO, and can advance to diabetic diet once okay with Orthopedic surgery team     Hyperlipidemia  Assessment & Plan  Continue pravachol as substitute for lovastatin    Hypothyroidism  Assessment & Plan  Clarified pt's dose she takes levothyroxine 50mg daily M-F and 75mg Sat/Sun  Continue levothyroxine as noted above    Morbid obesity ([address] )  Assessment & Plan  Patient would benefit from diet and lifestyle modifications          VTE Pharmacologic Prophylaxis:   Pharmacologic: Enoxaparin (Lovenox)  Mechanical VTE Prophylaxis in Place: Yes    Patient Centered Rounds: I have performed bedside rounds with nursing staff today  Discussions with Specialists or Other Care Team Provider:  Yes    Education and Discussions with Family / Patient:  Yes    Time Spent for Care: 15 minutes  More than 50% of total time spent on counseling and coordination of care as described above  Current Length of Stay: 1 day(s)    Current Patient Status: Inpatient   Certification Statement: The patient will continue to require additional inpatient hospital stay due to As per Orthopedic surgery service    Discharge Plan: To be determined    Code Status: No Order      Subjective:   Patient states pain in her right knee is much better after receiving morphine  She denies any nausea or vomiting  Objective:     Vitals:   Temp (24hrs), Av 2 °F (36 8 °C), Min:96 6 °F (35 9 °C), Max:99 8 °F (37 7 °C)    Temp:  [96 6 °F (35 9 °C)-99 8 °F (37 7 °C)] 99 8 °F (37 7 °C)  HR:  [64-82] 82  Resp:  [14-21] 18  BP: (100-138)/(51-74) 119/51  SpO2:  [93 %-98 %] 93 %  Body mass index is 39 99 kg/m²  Input and Output Summary (last 24 hours): Intake/Output Summary (Last 24 hours) at 2019 1058  Last data filed at 2019 1002  Gross per 24 hour   Intake 815 ml   Output 1485 ml   Net -670 ml       Physical Exam:     Physical Exam   Constitutional: She is oriented to person, place, and time  She appears well-developed and well-nourished  No distress  Morbidly obese body habitus   HENT:   Head: Normocephalic and atraumatic  Eyes: Pupils are equal, round, and reactive to light  EOM are normal    Cardiovascular: Normal rate, regular rhythm and normal heart sounds  Pulmonary/Chest: Effort normal and breath sounds normal    Abdominal: Soft  Bowel sounds are normal    Musculoskeletal: She exhibits tenderness  Intact bandage dressing with SCDs above right lower extremity  Neurological: She is alert and oriented to person, place, and time  Skin: Skin is warm and dry  She is not diaphoretic  Additional Data:     Labs:    Results from last 7 days   Lab Units 11/27/19  0421   WBC Thousand/uL 10 93*   HEMOGLOBIN g/dL 11 6   HEMATOCRIT % 36 3   PLATELETS Thousands/uL 277     Results from last 7 days   Lab Units 11/27/19  0421   SODIUM mmol/L 140   POTASSIUM mmol/L 4 4   CHLORIDE mmol/L 110*   CO2 mmol/L 23   BUN mg/dL 15   CREATININE mg/dL 0 81   ANION GAP mmol/L 7   CALCIUM mg/dL 8 8   GLUCOSE RANDOM mg/dL 110         Results from last 7 days   Lab Units 11/27/19  0625 11/26/19  2048 11/26/19  1655 11/26/19  1209 11/26/19  0608   POC GLUCOSE mg/dl 136 170* 195* 113 113                   * I Have Reviewed All Lab Data Listed Above  * Additional Pertinent Lab Tests Reviewed:  [address] Admission Reviewed    Imaging:    Imaging Reports Reviewed Today Include:  None available  Imaging Personally Reviewed by Myself Includes:  None    Recent Cultures (last 7 days):           Last 24 Hours Medication List:     Current Facility-Administered Medications:  acetaminophen 650 mg Oral Q4H PRN Abi Ponce PA-C    ascorbic acid 500 mg Oral BID Abi Ponce PA-C    calcium carbonate 1,000 mg Oral Daily PRN Abi Ponce PA-C    docusate sodium 100 mg Oral BID Abi Ponce PA-C    enoxaparin 40 mg Subcutaneous Daily Abi Ponce PA-C    ferrous sulfate 325 mg Oral BID With Meals Abi Ponce PA-C    folic acid 1 mg Oral Daily Abi Ponce PA-C    insulin lispro 1-5 Units Subcutaneous 4x Daily (AC & HS) Maryellen Maddox PA-C    iron sucrose 300 mg Intravenous Daily Abi Ponce PA-C Last Rate: 0 mg (11/26/19 1533)   lactated ringers 1,000 mL Intravenous Once PRN Abi Ponce PA-C    And        lactated ringers 1,000 mL Intravenous Once PRN Abi Ponce PA-C    levothyroxine 50 mcg Oral Once per day on Mon Tue Wed Thu Fri Maryellen Maddox PA-C    [START ON 11/30/2019] levothyroxine 75 mcg Oral Once per day on Sun Sat Brandan Bartholomew PA-C lisinopril 20 mg Oral Daily Hamilton Loveless, PA-MAYA    morphine injection 4 mg Intravenous Q4H PRN Dominga Loveless, PA-MAYA    multivitamin-minerals 1 tablet Oral Daily Dominga Loveless, [place]    oxyCODONE 10 mg Oral Q4H PRN Dominga Loveless, PA-MAYA    oxyCODONE 5 mg Oral Q4H PRN Dominga Loveless, PA-MAYA    pantoprazole 40 mg Oral Early Morning Dominga Loveless, PA-C    pravastatin 40 mg Oral Daily With [employer], PA-C    senna 1 tablet Oral Daily Hamilton Loveless, PA-C    sodium chloride 1,000 mL Intravenous Once PRN Hamilton Loveless, PA-C    And        sodium chloride 1,000 mL Intravenous Once PRN Dominga Loveless, PA-C    sodium chloride 100 mL/hr Intravenous Continuous Hamilton Loveless, PA-C Last Rate: 100 mL/hr (11/26/19 8192)        Today, Patient Was Seen By: Aisha Babcock MD    ** Please Note: Dictation voice to text software may have been used in the creation of this document   **

## 2019-11-28 VITALS
TEMPERATURE: 98 F | BODY MASS INDEX: 39.92 KG/M2 | WEIGHT: 198 LBS | HEIGHT: 59 IN | HEART RATE: 85 BPM | OXYGEN SATURATION: 91 % | SYSTOLIC BLOOD PRESSURE: 112 MMHG | RESPIRATION RATE: 16 BRPM | DIASTOLIC BLOOD PRESSURE: 43 MMHG

## 2019-11-28 PROBLEM — D62 ACUTE BLOOD LOSS ANEMIA: Status: ACTIVE | Noted: 2019-11-28

## 2019-11-28 LAB
ANION GAP SERPL CALCULATED.3IONS-SCNC: 7 MMOL/L (ref 4–13)
BUN SERPL-MCNC: 13 MG/DL (ref 5–25)
CALCIUM SERPL-MCNC: 9.5 MG/DL (ref 8.3–10.1)
CHLORIDE SERPL-SCNC: 105 MMOL/L (ref 100–108)
CO2 SERPL-SCNC: 23 MMOL/L (ref 21–32)
CREAT SERPL-MCNC: 0.78 MG/DL (ref 0.6–1.3)
ERYTHROCYTE [DISTWIDTH] IN BLOOD BY AUTOMATED COUNT: 15.3 % (ref 11.6–15.1)
GFR SERPL CREATININE-BSD FRML MDRD: 76 ML/MIN/1.73SQ M
GLUCOSE SERPL-MCNC: 107 MG/DL (ref 65–140)
GLUCOSE SERPL-MCNC: 130 MG/DL (ref 65–140)
GLUCOSE SERPL-MCNC: 150 MG/DL (ref 65–140)
HCT VFR BLD AUTO: 35.1 % (ref 34.8–46.1)
HGB BLD-MCNC: 11.1 G/DL (ref 11.5–15.4)
MCH RBC QN AUTO: 27.1 PG (ref 26.8–34.3)
MCHC RBC AUTO-ENTMCNC: 31.6 G/DL (ref 31.4–37.4)
MCV RBC AUTO: 86 FL (ref 82–98)
PLATELET # BLD AUTO: 260 THOUSANDS/UL (ref 149–390)
PMV BLD AUTO: 11 FL (ref 8.9–12.7)
POTASSIUM SERPL-SCNC: 4.2 MMOL/L (ref 3.5–5.3)
RBC # BLD AUTO: 4.09 MILLION/UL (ref 3.81–5.12)
SODIUM SERPL-SCNC: 135 MMOL/L (ref 136–145)
WBC # BLD AUTO: 12.51 THOUSAND/UL (ref 4.31–10.16)

## 2019-11-28 PROCEDURE — 97535 SELF CARE MNGMENT TRAINING: CPT

## 2019-11-28 PROCEDURE — 82948 REAGENT STRIP/BLOOD GLUCOSE: CPT

## 2019-11-28 PROCEDURE — 85027 COMPLETE CBC AUTOMATED: CPT | Performed by: PHYSICIAN ASSISTANT

## 2019-11-28 PROCEDURE — 99232 SBSQ HOSP IP/OBS MODERATE 35: CPT | Performed by: FAMILY MEDICINE

## 2019-11-28 PROCEDURE — 97116 GAIT TRAINING THERAPY: CPT

## 2019-11-28 PROCEDURE — 80048 BASIC METABOLIC PNL TOTAL CA: CPT | Performed by: PHYSICIAN ASSISTANT

## 2019-11-28 PROCEDURE — 97530 THERAPEUTIC ACTIVITIES: CPT

## 2019-11-28 PROCEDURE — 97110 THERAPEUTIC EXERCISES: CPT

## 2019-11-28 PROCEDURE — NC001 PR NO CHARGE: Performed by: PHYSICIAN ASSISTANT

## 2019-11-28 PROCEDURE — 99024 POSTOP FOLLOW-UP VISIT: CPT | Performed by: PHYSICIAN ASSISTANT

## 2019-11-28 RX ORDER — LISINOPRIL 10 MG/1
10 TABLET ORAL DAILY
Status: DISCONTINUED | OUTPATIENT
Start: 2019-11-28 | End: 2019-11-28

## 2019-11-28 RX ORDER — LISINOPRIL 10 MG/1
10 TABLET ORAL DAILY
Status: DISCONTINUED | OUTPATIENT
Start: 2019-11-28 | End: 2019-11-28 | Stop reason: HOSPADM

## 2019-11-28 RX ADMIN — SODIUM CHLORIDE 1000 ML: 0.9 INJECTION, SOLUTION INTRAVENOUS at 09:17

## 2019-11-28 RX ADMIN — FOLIC ACID 1 MG: 1 TABLET ORAL at 09:16

## 2019-11-28 RX ADMIN — Medication 1 TABLET: at 09:16

## 2019-11-28 RX ADMIN — LEVOTHYROXINE SODIUM 50 MCG: 50 TABLET ORAL at 06:21

## 2019-11-28 RX ADMIN — OXYCODONE HYDROCHLORIDE 10 MG: 10 TABLET ORAL at 06:21

## 2019-11-28 RX ADMIN — SENNOSIDES 8.6 MG: 8.6 TABLET, FILM COATED ORAL at 09:16

## 2019-11-28 RX ADMIN — OXYCODONE HYDROCHLORIDE 5 MG: 5 TABLET ORAL at 15:30

## 2019-11-28 RX ADMIN — FERROUS SULFATE TAB 325 MG (65 MG ELEMENTAL FE) 325 MG: 325 (65 FE) TAB at 09:16

## 2019-11-28 RX ADMIN — PANTOPRAZOLE SODIUM 40 MG: 40 TABLET, DELAYED RELEASE ORAL at 06:21

## 2019-11-28 RX ADMIN — DOCUSATE SODIUM 100 MG: 100 CAPSULE, LIQUID FILLED ORAL at 09:16

## 2019-11-28 RX ADMIN — OXYCODONE HYDROCHLORIDE AND ACETAMINOPHEN 500 MG: 500 TABLET ORAL at 09:16

## 2019-11-28 RX ADMIN — ENOXAPARIN SODIUM 40 MG: 40 INJECTION SUBCUTANEOUS at 09:14

## 2019-11-28 RX ADMIN — OXYCODONE HYDROCHLORIDE 10 MG: 10 TABLET ORAL at 02:26

## 2019-11-28 NOTE — ASSESSMENT & PLAN NOTE
BP is currently stable and optimal   Continue lisinopril substituted for enalapril  Dose of lisinopril decreased from 20 mg to 10 mg daily due to hypotension noted on vital signs which was also an issue postoperatively

## 2019-11-28 NOTE — ASSESSMENT & PLAN NOTE
Patient is POD#2 status post right total knee arthroplasty due to osteoarthritis  Patient is currently doing well post surgery  Continue pain control as per Orthopedic Service, encourage incentive spirometry use, out of bed to chair, DC Romeo catheter once patient able to use commode and okay with Orthopedic surgery  Continue DVT prophylaxis with Lovenox 40 mg subcutaneously daily/ TEDs/SCDs  She will be on daily Lovenox for DVT prophylaxis x4 weeks  WBAT RLE  PT/OT-awaiting PT recs  Further dispo to be determined by primary service

## 2019-11-28 NOTE — ASSESSMENT & PLAN NOTE
Lab Results   Component Value Date    HGBA1C 7 0 (H) 11/06/2019       Recent Labs     11/27/19  1111 11/27/19  1628 11/27/19  2101 11/28/19  0622   POCGLU 148* 165* 175* 107       Blood Sugar Average: Last 72 hrs:  (P) 520 [id number]   Patient's current hemoglobin A1c 7 0 which indicates well-controlled type 2 diabetes mellitus  Patient's blood sugars are well controlled  Patient's home medications of Amaryl and Jardiance currently on hold  Continue sliding scale insulin, diabetic diet, and Accu-Cheks q a c  And HS

## 2019-11-28 NOTE — PHYSICAL THERAPY NOTE
Physical Therapy Progress Note     11/28/19 1030   Pain Assessment   Pain Assessment 0-10   Pain Score 4   Pain Type Surgical pain   Pain Location Knee   Pain Orientation Right   Hospital Pain Intervention(s) Ambulation/increased activity;Repositioned;Cold applied   Response to Interventions Tolerated  Restrictions/Precautions   Weight Bearing Precautions Per Order Yes   RLE Weight Bearing Per Order WBAT   Other Precautions WBS; Fall Risk;Pain   General   Chart Reviewed Yes   Response to Previous Treatment Patient reporting fatigue but able to participate  Family/Caregiver Present No   Subjective   Subjective Willing to participate in therapy this AM    Bed Mobility   Supine to Sit 4  Minimal assistance   Additional items Assist x 1;HOB elevated; Bedrails;Leg ; Increased time required;Verbal cues;LE management   Sit to Supine 4  Minimal assistance   Additional items Assist x 1;Bedrails;Leg ; Increased time required;Verbal cues;LE management   Transfers   Sit to Stand 5  Supervision   Additional items Assist x 1;Bedrails; Increased time required;Armrests; Verbal cues   Stand to Sit 5  Supervision   Additional items Assist x 1;Bedrails;Armrests; Increased time required;Verbal cues   Ambulation/Elevation   Gait pattern Decreased foot clearance; Forward Flexion; Short stride; Excessively slow; Inconsistent agustin; Antalgic;Decreased R stance; Step to   Gait Assistance 5  Supervision   Additional items Assist x 1;Verbal cues; Tactile cues   Assistive Device Rolling walker   Distance 110'   Stair Management Assistance Not tested   Balance   Static Sitting Fair   Dynamic Sitting Fair   Static Standing Fair   Dynamic Standing Fair -   Ambulatory Fair -   Endurance Deficit   Endurance Deficit Yes   Endurance Deficit Description fatigue/pain   Activity Tolerance   Activity Tolerance Patient tolerated treatment well   Medical Staff Made Arely Pan [address]    Nurse Made Aware Yes   Exercises   TKR Sitting;Supine;10 reps;AAROM; Bilateral   Assessment   Prognosis Good   Problem List Decreased strength;Decreased range of motion;Decreased endurance; Impaired balance;Decreased mobility; Decreased skin integrity;Orthopedic restrictions;Pain   Assessment Pt  supine in bed upon my arrival  Pt  eager to participate in therapy this AM  Performance of HEP supine with cues provided for proper completion  Progressed with transfers being able to complete practicing proper technique with no noted LOB and Garrick of therapist provided  Pt  progressed with an increased amb  trial with standbyA of therapist with cues provided for LE sequencing  Pt  requested to use br, female OTR present for A with pericare  Pt  continued with an increased amb  trial with eventual return to room  Pt  repositioned supine in bed at end of treatment session  Pt  will continue progression of PT goals with intent of d/c home with HHPT and family support as needed when medically stable  Barriers to Discharge Inaccessible home environment   Barriers to Discharge Comments RAJAT   Goals   Patient Goals To go home  STG Expiration Date 11/29/19   PT Treatment Day 3   Plan   Treatment/Interventions Functional transfer training;LE strengthening/ROM; Endurance training; Therapeutic exercise; Bed mobility;Gait training;Spoke to nursing;Spoke to case management;OT;Family   Progress Progressing toward goals   PT Frequency 7x/wk; Twice a day;Weekend   Recommendation   Recommendation Home PT; Home with family support   Equipment Recommended Other (Comment)  (has DME at home  )   PT - OK to Discharge No  (Continued progression of PT goals prior to d/c )     Reinaldo Archuleta, PTA

## 2019-11-28 NOTE — SOCIAL WORK
Holiday on-call CM received TT from therapist making me aware that pt will require PT & OT  Pt was already accepted by [facility] from referral sent by [place] [id number] [facility]  Covering CM updated referral to include SN/PT/OT by [facility]  AVS updated       NEETA Monterroso  11/28/2019  1034

## 2019-11-28 NOTE — OCCUPATIONAL THERAPY NOTE
OccupationalTherapy Progress Note     Patient Name: Clint Junior  [id number] Date: 11/28/2019  Problem List  Principal Problem:    Primary osteoarthritis of right knee  Active Problems:    Diabetes mellitus type 2, controlled ([facility] )    Hypothyroidism    Benign essential HTN    Hyperlipidemia    Morbid obesity ([facility] )          11/28/19 1005   Restrictions/Precautions   Weight Bearing Precautions Per Order Yes   RLE Weight Bearing Per Order WBAT   Other Precautions WBS; Fall Risk;Pain   General   Response to Previous Treatment Patient with no complaints from previous session   Lifestyle   Autonomy At baseline, pt was I w/ ADLs, IADLs, and functional mobility/transfers w/ use of SPC and reports 1 fall PTA  Reciprocal Relationships Dtrs, son-in-law   Service to Others Retired   Intrinsic Gratification Volunteer work at [employer]   Pain Assessment   Pain Assessment 0-10   Pain Score 4   Pain Type Surgical pain   Pain Location Knee   Pain Orientation Right   Clinical Progression Gradually improving   Patient's Stated Pain Goal No pain   Hospital Pain Intervention(s) Repositioned; Ambulation/increased activity; Emotional support; Rest   Response to Interventions Tolerated OT   Multiple Pain Sites No   ADL   Where Assessed Chair   Grooming Assistance 5  Supervision/Setup   Grooming Deficit Setup; Increased time to complete;Standing with assistive device   Grooming Comments Standing at sink    [facility] [address]  Supervision/Setup   UB Dressing Deficit Setup;Supervision/safety; Increased time to complete   UB Dressing Comments Don/doff hospital gown, setup required   LB Dressing Assistance 5  Supervision/Setup   LB Dressing Deficit Setup;Verbal cueing;Supervision/safety; Increased time to complete; Don/doff R sock; Don/doff L sock; Use of adaptive equipment   LB Dressing Comments Pt demonstrated impaired functional reach during LB dressing, therefore educated pt on available LH AE through verbal instructions and demonstration   After education, pt able to don/doff B/L socks with Supervision with cues for technique  Pt reports owning LH shoehorn, sock aid, and reacher in home environment  Educated pt on donning operative LE first during LB ADLs   Toileting Assistance  5  Supervision/Setup   Toileting Deficit Supervison/safety; Increased time to complete;Perineal hygiene   Toileting Comments Toileting tasks completed with Supervision with cues for unilateral UE support on RW when standing for perineal hygiene tasks   Functional Standing Tolerance   Time 5 mins   Activity ADL tasks   Comments No LOBs noted   Bed Mobility   Supine to Sit 4  Minimal assistance   Additional items Assist x 1;HOB elevated; Bedrails; Increased time required;Verbal cues;LE management   Sit to Supine Unable to assess   Additional Comments Pt seated OOB in chair at end of session with call bell and phone within reach  PT present  Transfers   Sit to Stand 5  Supervision   Additional items Bedrails;Armrests; Increased time required;Verbal cues   Stand to Sit 5  Supervision   Additional items Armrests; Increased time required;Verbal cues   Toilet transfer 5  Supervision   Additional items Increased time required;Verbal cues; Commode   Additional Comments Cues for placement of operative LE and hands    Functional Mobility   Functional Mobility 5  Supervision   Additional Comments Assist x1   Additional items Rolling walker   Toilet Transfers   Toilet Transfer From Rolling walker   Toilet Transfer Type To and from   Toilet Transfer to Standard bedside commode   Toilet Transfer Technique Ambulating   Toilet Transfers Supervision;Verbal cues   Toilet Transfers Comments Grab bar use   Cognition   Overall Cognitive Status [facility]   Arousal/Participation Alert; Cooperative   Attention Within functional limits   Orientation Level Oriented X4   Memory Within functional limits   Following Commands Follows multistep commands with increased time or repetition   Activity Tolerance   Activity Tolerance Patient tolerated treatment well   Medical Staff Made Aware Phillip Cortez, PTA; Mary RN   Assessment   Assessment Pt seen for OT treatment session this AM focusing on functional activity tolerance, ADLs, bed mobility, and functional mobility/transfers  Pt alert and cooperative throughout session  Pt lying supine at start of session, requiring Min A for supine>sit with assist for LE management  Transfers completed with Supervision with cues for safe technique and placement of hands and operative LE  Supervision required for functional mobility with increased dynamic standing tolerance demonstrated  Toileting tasks completed with Supervision with cues for unilateral UE support on RW when standing for perineal hygiene tasks  Light grooming tasks completed while standing at sink with Supervision 2* setup required  Dressing tasks completed while seated in chair  UB dressing completed with Supervision 2* setup required and increased time to complete  Pt demonstrated impaired functional reach during LB dressing, therefore educated pt on available LH AE through verbal instructions and demonstration  After education, pt able to don/doff B/L socks with Supervision with cues for technique  Pt reports owning LH shoehorn, sock aid, and reacher in home environment  Pt seated OOB in chair at end of session with call bell and phone within reach  PT present  Continue to recommend Home OT when medically cleared  OT to follow pt on caseload  Plan   Treatment Interventions ADL retraining;Functional transfer training;UE strengthening/ROM; Endurance training;Patient/family training;Equipment evaluation/education; Compensatory technique education; Energy conservation; Activityengagement   Goal Expiration Date 12/10/19   OT Treatment Day 2   OT Frequency 3-5x/wk   Recommendation   OT Discharge Recommendation Home OT   OT - OK to Discharge Yes  (when medically cleared)   Barthel Index   Feeding 10   Bathing 0   Grooming Score 5   Dressing Score 5   Bladder Score 10   Bowels Score 10   Toilet Use Score 5   Transfers (Bed/Chair) Score 10   Mobility (Level Surface) Score 0   Stairs Score 0   Barthel Index Score 55   Modified [place] Scale   Modified Sheila Scale 3         Penny Parker, OTR/L

## 2019-11-28 NOTE — PHYSICAL THERAPY NOTE
Physical Therapy Progress Note     11/28/19 1448   Pain Assessment   Pain Assessment 0-10   Pain Score 4   Pain Type Surgical pain   Pain Location Knee   Pain Orientation Right   Hospital Pain Intervention(s) Ambulation/increased activity;Repositioned;Cold applied   Response to Interventions Tolerated  Restrictions/Precautions   Weight Bearing Precautions Per Order Yes   RLE Weight Bearing Per Order WBAT   Other Precautions WBS; Fall Risk;Pain   General   Chart Reviewed Yes   Response to Previous Treatment Patient reporting fatigue but able to participate  Family/Caregiver Present Yes   Subjective   Subjective Willing to participate in therapy this PM    Bed Mobility   Supine to Sit 4  Minimal assistance   Additional items Assist x 1;HOB elevated; Bedrails;Leg ; Increased time required;Verbal cues;LE management   Sit to Supine 4  Minimal assistance   Additional items Assist x 1;Bedrails;Leg ; Increased time required;Verbal cues;LE management   Transfers   Sit to Stand 5  Supervision   Additional items Assist x 1;Bedrails; Increased time required;Verbal cues   Stand to Sit 5  Supervision   Additional items Assist x 1;Bedrails; Increased time required;Verbal cues   Ambulation/Elevation   Gait pattern Decreased foot clearance; Antalgic; Forward Flexion; Short stride; Step to;Excessively slow; Inconsistent agustin;Decreased R stance   Gait Assistance 5  Supervision   Additional items Assist x 1;Verbal cues; Tactile cues   Assistive Device Rolling walker   Distance 100'   Stair Management Assistance 5  Supervision   Additional items Assist x 1; Tactile cues; Verbal cues   Stair Management Technique Two rails; Step to pattern; Foreward;Nonreciprocal   Number of Stairs 10   Balance   Static Sitting Fair   Dynamic Sitting Fair   Static Standing Fair   Dynamic Standing Fair -   Ambulatory Fair -   Endurance Deficit   Endurance Deficit No   Activity Tolerance   Activity Tolerance Patient tolerated treatment well   Nurse Made Aware Yes   Exercises   TKR Supine;10 reps;AAROM; Bilateral   Assessment   Prognosis Good   Problem List Decreased strength;Decreased range of motion;Decreased endurance; Impaired balance;Decreased mobility; Decreased skin integrity;Obesity;Orthopedic restrictions;Pain   Assessment Pt  supine in bed upon my arrival  Pt  eager to participate in therapy this PM, in hopes of going home today  Performance of HEP supine in bed with cues provided for proper completion  Progressed with transfers being able to complete practicing proper technique with no noted LOB  Taken to steps via [employer]  Pt  able to complete stair training, practicing proper technique with no noted LOB  Both pt and pt's family report good understanding at this time with no questions  Pt  returned to room and repositioned supine in bed at end of treatment session with ice applied  Pt  has completed all her PT goals and is safe for d/c home with HHPT and family support as needed when medically stable  Barriers to Discharge None   Goals   Patient Goals To go home  STG Expiration Date 11/29/19   PT Treatment Day 4   Plan   Treatment/Interventions Functional transfer training;LE strengthening/ROM; Therapeutic exercise; Endurance training;Elevations; Bed mobility;Gait training;Spoke to nursing;Spoke to case management; Family   Progress Progressing toward goals   PT Frequency 7x/wk; Twice a day;Weekend   Recommendation   Recommendation Home PT; Home with family support   PT - OK to Discharge Yes  (if d/c when medically stable )     Mandi Roche PTA

## 2019-11-28 NOTE — PLAN OF CARE
Problem: PHYSICAL THERAPY ADULT  Goal: Performs mobility at highest level of function for planned discharge setting  See evaluation for individualized goals  Description  Treatment/Interventions: Functional transfer training, LE strengthening/ROM, ADL retraining, Elevations, Therapeutic exercise, Endurance training, Patient/family training, Equipment eval/education, Bed mobility, Gait training, Compensatory technique education, Spoke to nursing, OT  Equipment Recommended: Walker(pt owns RW)       See flowsheet documentation for full assessment, interventions and recommendations  11/28/2019 1515 by Reinaldo Archuleta PTA  Outcome: Progressing  Note:   Prognosis: Good  Problem List: Decreased strength, Decreased range of motion, Decreased endurance, Impaired balance, Decreased mobility, Decreased skin integrity, Obesity, Orthopedic restrictions, Pain  Assessment: Pt  supine in bed upon my arrival  Pt  eager to participate in therapy this PM, in hopes of going home today  Performance of HEP supine in bed with cues provided for proper completion  Progressed with transfers being able to complete practicing proper technique with no noted LOB  Taken to steps via [employer]  Pt  able to complete stair training, practicing proper technique with no noted LOB  Both pt and pt's family report good understanding at this time with no questions  Pt  returned to room and repositioned supine in bed at end of treatment session with ice applied  Pt  has completed all her PT goals and is safe for d/c home with HHPT and family support as needed when medically stable  Barriers to Discharge: None  Barriers to Discharge Comments: RAJAT  Recommendation: Home PT, Home with family support     PT - OK to Discharge: Yes(if d/c when medically stable )    See flowsheet documentation for full assessment       11/28/2019 1154 by Reinaldo Archuleta PTA  Outcome: Progressing  Note:   Prognosis: Good  Problem List: Decreased strength, Decreased range of motion, Decreased endurance, Impaired balance, Decreased mobility, Decreased skin integrity, Orthopedic restrictions, Pain  Assessment: Pt  supine in bed upon my arrival  Pt  eager to participate in therapy this AM  Performance of HEP supine with cues provided for proper completion  Progressed with transfers being able to complete practicing proper technique with no noted LOB and Garrick of therapist provided  Pt  progressed with an increased amb  trial with standbyA of therapist with cues provided for LE sequencing  Pt  requested to use br, female OTR present for A with pericare  Pt  continued with an increased amb  trial with eventual return to room  Pt  repositioned supine in bed at end of treatment session  Pt  will continue progression of PT goals with intent of d/c home with HHPT and family support as needed when medically stable  Barriers to Discharge: Inaccessible home environment  Barriers to Discharge Comments: RAJAT  Recommendation: Home PT, Home with family support     PT - OK to Discharge: No(Continued progression of PT goals prior to d/c )    See flowsheet documentation for full assessment

## 2019-11-28 NOTE — PLAN OF CARE
Problem: OCCUPATIONAL THERAPY ADULT  Goal: Performs self-care activities at highest level of function for planned discharge setting  See evaluation for individualized goals  Description  Treatment Interventions: ADL retraining, Functional transfer training, UE strengthening/ROM, Endurance training, Patient/family training, Equipment evaluation/education, Compensatory technique education, Energy conservation, Activityengagement          See flowsheet documentation for full assessment, interventions and recommendations  Outcome: Progressing  Note:   Limitation: Decreased ADL status, Decreased UE strength, Decreased endurance, Decreased self-care trans, Decreased high-level ADLs  Prognosis: Good  Assessment: Pt seen for OT treatment session this AM focusing on functional activity tolerance, ADLs, bed mobility, and functional mobility/transfers  Pt alert and cooperative throughout session  Pt lying supine at start of session, requiring Min A for supine>sit with assist for LE management  Transfers completed with Supervision with cues for safe technique and placement of hands and operative LE  Supervision required for functional mobility with increased dynamic standing tolerance demonstrated  Toileting tasks completed with Supervision with cues for unilateral UE support on RW when standing for perineal hygiene tasks  Light grooming tasks completed while standing at sink with Supervision 2* setup required  Dressing tasks completed while seated in chair  UB dressing completed with Supervision 2* setup required and increased time to complete  Pt demonstrated impaired functional reach during LB dressing, therefore educated pt on available LH AE through verbal instructions and demonstration  After education, pt able to don/doff B/L socks with Supervision with cues for technique  Pt reports owning  shoehorn, sock aid, and reacher in home environment   Pt seated OOB in chair at end of session with call bell and phone within reach  PT present  Continue to recommend Home OT when medically cleared  OT to follow pt on caseload        OT Discharge Recommendation: Home OT  OT - OK to Discharge: Yes(when medically cleared)

## 2019-11-28 NOTE — DISCHARGE SUMMARY
Admission Diagnosis: Osteoarthritis, Right knee  D/C Diagnosis:  S/P Right total knee arthroplasty   Procedure: Right total knee arthroplasty  Date of sugery: 11/26/19  Admission Dates: 11/26/19-11/28/19      Christian Reyes presented to the hospital on 11/26/2019 with Right knee arthritis for a total knee arthoplasty  She underwent the procedure that same day and the details of the procedure can be found in the operative note  Post-operatively She was placed on a pain and bowel regimen  She was made WBAT of the Right lower extremity and consults were placed to physical therapy and occupational therapy as well as internal medicine who all saw him throughout Her admission  She was started on Lovenox and sequential compression as well as ISAAK stockings were used for DVT prophylaxis  She received 24 hours of antibiotic prophylaxis  On POD 1 labs were checked  Her dressing was found to be clean, dry, and intact  She worked with physical therapy  On POD 2 labs were checked  Her  Dressing was changed and incision was found to be clean, dry, and intact  She worked with physical therapy and made progress  Later that day She was found to be safe and stable for discharge to Home  Discharge instructions were provided as well as needed prescriptions        Results from last 7 days   Lab Units 11/28/19  0510 11/27/19  0421   WBC Thousand/uL 12 51* 10 93*   HEMOGLOBIN g/dL 11 1* 11 6   HEMATOCRIT % 35 1 36 3   PLATELETS Thousands/uL 260 277         Results from last 7 days   Lab Units 11/28/19  0510 11/27/19  0421   POTASSIUM mmol/L 4 2 4 4   CHLORIDE mmol/L 105 110*   CO2 mmol/L 23 23   BUN mg/dL 13 15   CREATININE mg/dL 0 78 0 81   CALCIUM mg/dL 9 5 8 8

## 2019-11-28 NOTE — PLAN OF CARE
Problem: PHYSICAL THERAPY ADULT  Goal: Performs mobility at highest level of function for planned discharge setting  See evaluation for individualized goals  Description  Treatment/Interventions: Functional transfer training, LE strengthening/ROM, ADL retraining, Elevations, Therapeutic exercise, Endurance training, Patient/family training, Equipment eval/education, Bed mobility, Gait training, Compensatory technique education, Spoke to nursing, OT  Equipment Recommended: Walker(pt owns RW)       See flowsheet documentation for full assessment, interventions and recommendations  Outcome: Progressing  Note:   Prognosis: Good  Problem List: Decreased strength, Decreased range of motion, Decreased endurance, Impaired balance, Decreased mobility, Decreased skin integrity, Orthopedic restrictions, Pain  Assessment: Pt  supine in bed upon my arrival  Pt  eager to participate in therapy this AM  Performance of HEP supine with cues provided for proper completion  Progressed with transfers being able to complete practicing proper technique with no noted LOB and Garrick of therapist provided  Pt  progressed with an increased amb  trial with standbyA of therapist with cues provided for LE sequencing  Pt  requested to use br, female OTR present for A with pericare  Pt  continued with an increased amb  trial with eventual return to room  Pt  repositioned supine in bed at end of treatment session  Pt  will continue progression of PT goals with intent of d/c home with HHPT and family support as needed when medically stable  Barriers to Discharge: Inaccessible home environment  Barriers to Discharge Comments: RAJAT  Recommendation: Home PT, Home with family support     PT - OK to Discharge: No(Continued progression of PT goals prior to d/c )    See flowsheet documentation for full assessment

## 2019-11-28 NOTE — PROGRESS NOTES
Progress Note - Orthopedics   Tamela Calderon 69 y o  female MRN: [id number]  Unit/Bed#: E2 -01      Subjective:    69 y  o female seen and examined this morning  Postoperative day 2 status post right total knee arthroplasty  Overall, patient is doing well  Her pain is well controlled  Nursing reports that they discontinued the thigh-high ISAAK stocking last night because it was severely cutting into her thigh  They did apply a knee-high ISAAK stocking    Denies fevers chills, CP, SOB    Labs:  0   Lab Value Date/Time    HCT 35 1 11/28/2019 0510    HCT 36 3 11/27/2019 0421    HCT 45 1 10/28/2019 1145    HGB 11 1 (L) 11/28/2019 0510    HGB 11 6 11/27/2019 0421    HGB 14 2 10/28/2019 1145    INR 0 92 10/28/2019 1145    WBC 12 51 (H) 11/28/2019 0510    WBC 10 93 (H) 11/27/2019 0421    WBC 9 19 10/28/2019 1145    CRP 4 1 (H) 10/28/2019 1145       Meds:    Current Facility-Administered Medications:     acetaminophen (TYLENOL) tablet 650 mg, 650 mg, Oral, Q4H PRN, Kesha Madaiy, PA-C, 650 mg at 11/26/19 2245    ascorbic acid (VITAMIN C) tablet 500 mg, 500 mg, Oral, BID, Kesha Buffy, PA-C, 500 mg at 11/27/19 1727    calcium carbonate (TUMS) chewable tablet 1,000 mg, 1,000 mg, Oral, Daily PRN, Kesha Ajy, PA-C    docusate sodium (COLACE) capsule 100 mg, 100 mg, Oral, BID, Kesha Buffy, PA-C, 100 mg at 11/27/19 1727    enoxaparin (LOVENOX) subcutaneous injection 40 mg, 40 mg, Subcutaneous, Daily, Kesha Madaiy, PA-C, 40 mg at 11/27/19 0808    ferrous sulfate tablet 325 mg, 325 mg, Oral, BID With Meals, Kesha Buffy, PA-C, 325 mg at 01/77/52 7819    folic acid (FOLVITE) tablet 1 mg, 1 mg, Oral, Daily, Kesha Madaiy, PA-C, 1 mg at 11/27/19 0809    HYDROmorphone (DILAUDID) injection 0 2 mg, 0 2 mg, Intravenous, Q4H PRN, Katie Garcia DO    insulin lispro (HumaLOG) 100 units/mL subcutaneous injection 1-5 Units, 1-5 Units, Subcutaneous, 4x Daily (AC & HS), 1 Units at 11/27/19 5529 **AND** Fingerstick Glucose (POCT), , , 4x Daily AC and at bedtime, Maryellen Maddox PA-C    levothyroxine tablet 50 mcg, 50 mcg, Oral, Once per day on Mon Tue Wed Thu Fri, Maryellen Maddox PA-C, 50 mcg at 11/28/19 9086    [START ON 11/30/2019] levothyroxine tablet 75 mcg, 75 mcg, Oral, Once per day on Sun Sat, Ya Maddox PA-C    lisinopril (ZESTRIL) tablet 10 mg, 10 mg, Oral, Daily, Jacinda Denise MD    multivitamin-minerals (CENTRUM) tablet 1 tablet, 1 tablet, Oral, Daily, Hailey Leach PA-C, 1 tablet at 11/27/19 0809    oxyCODONE (ROXICODONE) immediate release tablet 10 mg, 10 mg, Oral, Q3H PRN, Wynema Aquas, DO, 10 mg at 11/28/19 1182    oxyCODONE (ROXICODONE) IR tablet 5 mg, 5 mg, Oral, Q3H PRN, Wynema Aquas, DO    pantoprazole (PROTONIX) EC tablet 40 mg, 40 mg, Oral, Early Morning, Hailey Leach PA-C, 40 mg at 11/28/19 8405    pravastatin (PRAVACHOL) tablet 40 mg, 40 mg, Oral, Daily With Boni Mark, GINNA, 40 mg at 11/27/19 1727    senna (SENOKOT) tablet 8 6 mg, 1 tablet, Oral, Daily, Hailey Leach PA-C, 8 6 mg at 11/27/19 0808    sodium chloride 0 9 % bolus 1,000 mL, 1,000 mL, Intravenous, Once, Jacinda Denise MD    sodium chloride 0 9 % infusion, 100 mL/hr, Intravenous, Continuous, Hailey Leach PA-C, Stopped at 11/27/19 1433    Blood Culture:   No results found for: BLOODCX    Wound Culture:   No results found for: WOUNDCULT    Ins and Outs:  I/O last 24 hours: In: 3040 [P O :120; I V :2655; IV Piggyback:265]  Out: 525 [Urine:525]          Physical:  Vitals:    11/28/19 0720   BP: 102/50   Pulse: 85   Resp: 16   Temp: 98 °F (36 7 °C)   SpO2: 91%     Musculoskeletal: right Lower Extremity  · Incision is well approximated with skin glue  No erythema, no drainage  · Dressing:  Changed today  · TTP:  Khadra-incisional tenderness as to be expected postoperatively  · Right knee and lower extremity swelling as to be expected  No active signs of bleeding at this time    · SILT s/s/sp/dp/t  +fhl/ehl, +ankle dorsi/plantar flexion  · 2+ DP pulse  Limb is warm and well perfused  Good color and capillary refill to toes  Assessment:    69 y  o female POD 2 status post right total knee arthroplasty, ABLA  Plan:  · WBAT RLE  · Dressing changed today, continue dressing changes daily prn  · Hgb 11 1 today, down from 14 2 preoperatively  Greater than 2 gram drop which qualifies for diagnosis of acute blood loss anemia, will monitor and administer IVF/prbc per SLIM as indicated   · PT/OT  · Pain control prn, hold for sedation  · DVT ppx:  Lovenox/TEDs/SCDs  Will be on Lovenox x 4 weeks  · Bowel regimen prn  · Plan for discharge home pending PT clearance      Mary Mera PA-C

## 2019-11-28 NOTE — PROGRESS NOTES
Progress Note - Laura Lewis 1945, 69 y o  female MRN: [id number]    Unit/Bed#: E2 -01 Encounter: [id number]    Primary Care Provider: Fabiola Torres MD   Date and time admitted to hospital: 11/26/2019  5:21 AM        * Primary osteoarthritis of right knee  Assessment & Plan  Patient is POD#2 status post right total knee arthroplasty due to osteoarthritis  Patient is currently doing well post surgery  Continue pain control as per Orthopedic Service, encourage incentive spirometry use, out of bed to chair, DC Romeo catheter once patient able to use commode and okay with Orthopedic surgery  Continue DVT prophylaxis with Lovenox 40 mg subcutaneously daily/ TEDs/SCDs  She will be on daily Lovenox for DVT prophylaxis x4 weeks  WBAT RLE  PT/OT-awaiting PT recs  Further dispo to be determined by primary service  Acute blood loss anemia  Assessment & Plan  Patient with acute blood loss anemia with drop in hemoglobin from 14 2 preoperatively on 10/28/2019--> 11 1 today 11/28/2019  This is most likely acute postsurgical blood loss  Continue to monitor hemoglobin and hematocrit daily  Maintain low threshold for transfusion if hemoglobin drops below 7  Benign essential HTN  Assessment & Plan  BP is currently stable and optimal   Continue lisinopril substituted for enalapril  Dose of lisinopril decreased from 20 mg to 10 mg daily due to hypotension noted on vital signs which was also an issue postoperatively  Diabetes mellitus type 2, controlled [facility])  Assessment & Plan  Lab Results   Component Value Date    HGBA1C 7 0 (H) 11/06/2019       Recent Labs     11/27/19  1111 11/27/19  1628 11/27/19  2101 11/28/19  0622   POCGLU 148* 165* 175* 107       Blood Sugar Average: Last 72 hrs:  (P) 789 [id number]   Patient's current hemoglobin A1c 7 0 which indicates well-controlled type 2 diabetes mellitus  Patient's blood sugars are well controlled    Patient's home medications of Amaryl and Freddie Pulido currently on hold  Continue sliding scale insulin, diabetic diet, and Accu-Cheks q a c  And HS  Hyperlipidemia  Assessment & Plan  Continue pravachol as substitute for lovastatin    Hypothyroidism  Assessment & Plan  Clarified pt's dose she takes levothyroxine 50mg daily - and 75mg Sat/Sun  Continue levothyroxine as noted above    Morbid obesity ([address] )  Assessment & Plan  Patient would benefit from diet and lifestyle modifications  VTE Pharmacologic Prophylaxis:   Pharmacologic: Enoxaparin (Lovenox)  Mechanical VTE Prophylaxis in Place: Yes    Patient Centered Rounds: I have performed bedside rounds with nursing staff today  Discussions with Specialists or Other Care Team Provider:  Yes    Education and Discussions with Family / Patient:  Yes with patient and her daughter at the bedside    Time Spent for Care: 15 minutes  More than 50% of total time spent on counseling and coordination of care as described above  Current Length of Stay: 2 day(s)    Current Patient Status: Inpatient   Certification Statement: The patient will continue to require additional inpatient hospital stay due to As per Orthopedic surgery service    Discharge Plan: To be determined by Orthopedic surgery service    Code Status: Level 1 - Full Code      Subjective:   Patient states pain in her right knee is better controlled  She denies any nausea or vomiting  Objective:     Vitals:   Temp (24hrs), Av 2 °F (36 8 °C), Min:97 5 °F (36 4 °C), Max:99 °F (37 2 °C)    Temp:  [97 5 °F (36 4 °C)-99 °F (37 2 °C)] 98 °F (36 7 °C)  HR:  [76-85] 85  Resp:  [16-18] 16  BP: (102-159)/(50-63) 102/50  SpO2:  [91 %-98 %] 91 %  Body mass index is 39 99 kg/m²  Input and Output Summary (last 24 hours):        Intake/Output Summary (Last 24 hours) at 2019 1033  Last data filed at 2019 1900  Gross per 24 hour   Intake 3040 ml   Output 400 ml   Net 2640 ml       Physical Exam:     Physical Exam   Constitutional: She is oriented to person, place, and time  She appears well-developed  No distress  Morbidly obese body habitus   HENT:   Head: Normocephalic and atraumatic  Eyes: Pupils are equal, round, and reactive to light  EOM are normal    Neck: Normal range of motion  Neck supple  Cardiovascular: Normal rate, regular rhythm and normal heart sounds  No murmur heard  Pulmonary/Chest: Effort normal and breath sounds normal    Abdominal: Soft  Bowel sounds are normal  She exhibits no distension  There is no tenderness  Musculoskeletal: She exhibits tenderness  Right lower extremity in gauze dressing with ice packs  Neurological: She is alert and oriented to person, place, and time  Skin: Skin is warm and dry  She is not diaphoretic  Additional Data:     Labs:    Results from last 7 days   Lab Units 11/28/19  0510   WBC Thousand/uL 12 51*   HEMOGLOBIN g/dL 11 1*   HEMATOCRIT % 35 1   PLATELETS Thousands/uL 260     Results from last 7 days   Lab Units 11/28/19  0510   SODIUM mmol/L 135*   POTASSIUM mmol/L 4 2   CHLORIDE mmol/L 105   CO2 mmol/L 23   BUN mg/dL 13   CREATININE mg/dL 0 78   ANION GAP mmol/L 7   CALCIUM mg/dL 9 5   GLUCOSE RANDOM mg/dL 130         Results from last 7 days   Lab Units 11/28/19  0622 11/27/19  2101 11/27/19  1628 11/27/19  1111 11/27/19  0625 11/26/19  2048 11/26/19  1655 11/26/19  1209 11/26/19  0608   POC GLUCOSE mg/dl 107 175* 165* 148* 136 170* 195* 113 113                   * I Have Reviewed All Lab Data Listed Above  * Additional Pertinent Lab Tests Reviewed:  [facility] 68 Admission Reviewed    Imaging:    Imaging Reports Reviewed Today Include:  None available  Imaging Personally Reviewed by Myself Includes:  None    Recent Cultures (last 7 days):           Last 24 Hours Medication List:     Current Facility-Administered Medications:  acetaminophen 650 mg Oral Q4H PRN Kathya Bean PA-C    ascorbic acid 500 mg Oral BID Kathya Bean PA-C    calcium carbonate 1,000 mg Oral Daily PRN Reji Calix PA-C    docusate sodium 100 mg Oral BID Reji Calix PA-C    enoxaparin 40 mg Subcutaneous Daily Reji Calix PA-C    ferrous sulfate 325 mg Oral BID With Meals Reji Calix PA-C    folic acid 1 mg Oral Daily Reji Calix PA-C    HYDROmorphone 0 2 mg Intravenous Q4H PRN Griselda Ganmaker, DO    insulin lispro 1-5 Units Subcutaneous 4x Daily (AC & HS) Maryellen Maddox PA-C    levothyroxine 50 mcg Oral Once per day on Mon Tue Wed Thu Fri Maryellen Maddox PA-C    [START ON 11/30/2019] levothyroxine 75 mcg Oral Once per day on Sun Sat 120 Lyndsay Maddox PA-C    lisinopril 10 mg Oral Daily Allison Whiteside MD    multivitamin-minerals 1 tablet Oral Daily Reji Calix PA-C    oxyCODONE 10 mg Oral Q3H PRN Griselda Dean, DO    oxyCODONE 5 mg Oral Q3H PRN Griselda Dean, DO    pantoprazole 40 mg Oral Early Morning Reji Calix PA-C    pravastatin 40 mg Oral Daily With [employer], GINNA    senna 1 tablet Oral Daily Reji Calix PA-C    sodium chloride 1,000 mL Intravenous Once Allison Whiteside MD Last Rate: 1,000 mL (11/28/19 0917)   sodium chloride 100 mL/hr Intravenous Continuous Reji Calix PA-C Last Rate: Stopped (11/27/19 1433)        Today, Patient Was Seen By: Allison Whiteside MD    ** Please Note: Dictation voice to text software may have been used in the creation of this document   **

## 2019-11-28 NOTE — ASSESSMENT & PLAN NOTE
Patient with acute blood loss anemia with drop in hemoglobin from 14 2 preoperatively on 10/28/2019--> 11 1 today 11/28/2019  This is most likely acute postsurgical blood loss  Continue to monitor hemoglobin and hematocrit daily  Maintain low threshold for transfusion if hemoglobin drops below 7

## 2019-11-29 ENCOUNTER — TRANSITIONAL CARE MANAGEMENT (OUTPATIENT)
Dept: FAMILY MEDICINE CLINIC | Facility: CLINIC | Age: 74
End: 2019-11-29

## 2019-11-29 ENCOUNTER — TELEPHONE (OUTPATIENT)
Dept: OBGYN CLINIC | Facility: HOSPITAL | Age: 74
End: 2019-11-29

## 2019-12-02 ENCOUNTER — TELEPHONE (OUTPATIENT)
Dept: OBGYN CLINIC | Facility: HOSPITAL | Age: 74
End: 2019-12-02

## 2019-12-02 NOTE — TELEPHONE ENCOUNTER
Pt contacted with  #[phone] and patient answered, requesting a callback in 10 minutes as she needs to use the bathroom  Pt contacted again at 11:50 and used  #[id number], no answer, VM left for patient

## 2019-12-02 NOTE — TELEPHONE ENCOUNTER
Pt contacted today to complete a postoperative follow up call assessment  [id number] [facility], #[id number] used  Pt reports doing "very good "     Pt's AVS and AVS medication list were reviewed  Pt reports taking tylenol 325mg for pain, denies taking any oxycodone today yet but reports current 7 out 10 pain at this time  Pt reports planning to take 5mg of oxycodone at end of the call for pain  Pt also taking gabapentin 100 mg BID as prescribed  Pt reports taking "everything on the list" from DC  Pt also taking lovenox every morning for blood clot prevention, and has been taking colace BID  Pt reports 2 episodes of diarrhea and was advised to discontinue colace administration  Pt denies any nausea, vomiting or abdominal pain  Pt reports swelling "went down a lot" and reports icing and elevating  Pt reports dressing is dry and denies any drainage  Pt reports "everyday a little more progress " Pt reports ambulating with the walker and "been walking" around the home  Pt reports "everything is fine "    Pt denies any chest pain, SOB, dizziness, fever, calf pain, and/or any addtl symptoms  pt encouraged to call me with questions, concerns or issues

## 2019-12-06 DIAGNOSIS — M25.551 PAIN OF RIGHT HIP JOINT: ICD-10-CM

## 2019-12-06 RX ORDER — PURIFIED WATER 99.05 G/100ML
LIQUID OPHTHALMIC
Qty: 90 TABLET | Refills: 0 | OUTPATIENT
Start: 2019-12-06

## 2019-12-09 ENCOUNTER — OFFICE VISIT (OUTPATIENT)
Dept: FAMILY MEDICINE CLINIC | Facility: CLINIC | Age: 74
End: 2019-12-09

## 2019-12-09 VITALS
WEIGHT: 193 LBS | BODY MASS INDEX: 38.91 KG/M2 | HEIGHT: 59 IN | RESPIRATION RATE: 16 BRPM | DIASTOLIC BLOOD PRESSURE: 60 MMHG | SYSTOLIC BLOOD PRESSURE: 126 MMHG | TEMPERATURE: 98.1 F | HEART RATE: 77 BPM | OXYGEN SATURATION: 96 %

## 2019-12-09 DIAGNOSIS — Z96.651 STATUS POST RIGHT KNEE REPLACEMENT: ICD-10-CM

## 2019-12-09 DIAGNOSIS — M17.11 PRIMARY OSTEOARTHRITIS OF RIGHT KNEE: ICD-10-CM

## 2019-12-09 DIAGNOSIS — M25.561 RIGHT KNEE PAIN, UNSPECIFIED CHRONICITY: Primary | ICD-10-CM

## 2019-12-09 PROBLEM — Z76.89 ENCOUNTER FOR SUPPORT AND COORDINATION OF TRANSITION OF CARE: Status: ACTIVE | Noted: 2019-12-09

## 2019-12-09 PROCEDURE — 1111F DSCHRG MED/CURRENT MED MERGE: CPT | Performed by: FAMILY MEDICINE

## 2019-12-09 PROCEDURE — 1160F RVW MEDS BY RX/DR IN RCRD: CPT | Performed by: FAMILY MEDICINE

## 2019-12-09 PROCEDURE — 99496 TRANSJ CARE MGMT HIGH F2F 7D: CPT | Performed by: FAMILY MEDICINE

## 2019-12-09 RX ORDER — ENALAPRIL MALEATE 2.5 MG/1
2.5 TABLET ORAL EVERY EVENING
Refills: 1 | COMMUNITY
Start: 2019-11-15

## 2019-12-09 NOTE — PROGRESS NOTES
Assessment/Plan:    Primary osteoarthritis of right knee  Patient is doing well after surgery, no signs of infection, no distress from pain  She is really walking and doing her medications as recommended  She is injecting Lovenox daily  I encouraged the patient to finish Lovenox treatment ends on December 26, 2019  I did conciliation of her meds and we discussed about chronic conditions and trying to control her polypharmacy  She is a diabetic patient, and suffer hypertension  She has obesity eating and is losing weight nicely under Semaglutide (Ozempic)  She will return one month to follow up her coordination of medications and her chronic conditions  Diagnoses and all orders for this visit:    Right knee pain, unspecified chronicity  -     Ambulatory referral to Family Practice    Primary osteoarthritis of right knee  -     Ambulatory referral to Family Practice    Status post right knee replacement    Other orders  -     enalapril (VASOTEC) 2 5 mg tablet; Take 2 5 mg by mouth daily          Subjective:      Patient ID: Kenzie Rossi is a 69 y o  female  Patient has surgery on November 26, 2019, she was discharged on November 28, 2019  She had total knee replacement of right knee due to osteoarthritis  She has being recovering very well  She does not have much complaints  She is injecting herself daily with Lovenox, her last dose will be on December 26, 2019  She denies pain in that posterior area of her lower legs, she denies shortness of breath, she denies fever  The following portions of the patient's history were reviewed and updated as appropriate: allergies, current medications, past family history, past medical history, past social history, past surgical history and problem list     Review of Systems   Constitutional: Negative for diaphoresis, fatigue, fever and unexpected weight change  Respiratory: Negative for cough, chest tightness, shortness of breath and wheezing  Cardiovascular: Negative for chest pain, palpitations and leg swelling  Gastrointestinal: Negative for abdominal pain, blood in stool and constipation  Musculoskeletal: Positive for arthralgias (From recent surgery  )  Skin:        Edema of right leg and  mild redness  Neurological: Negative for dizziness, syncope, light-headedness and headaches  Hematological: Does not bruise/bleed easily  Psychiatric/Behavioral: Negative for behavioral problems, self-injury and sleep disturbance  The patient is not nervous/anxious  Objective:    TCM Call (since 11/8/2019)     Date and time call was made  11/29/2019  1:37 PM    Hospital care reviewed  Records reviewed    Patient was hospitialized at  [facility]     Date of Admission  11/26/19    Date of discharge  11/28/19    Diagnosis  Primary osteoarthritis of right knee     Disposition  Home    Current Symptoms  None      TCM Call (since 11/8/2019)     Post hospital issues  None    Should patient be enrolled in anticoag monitoring? No    Scheduled for follow up? Yes    Did you obtain your prescribed medications  Yes    Do you need help managing your prescriptions or medications  No    Is transportation to your appointment needed  No    I have advised the patient to call PCP with any new or worsening symptoms  Anabell Marie, Practice Administrator        /60 (BP Location: Left arm, Patient Position: Sitting, Cuff Size: Standard)   Pulse 77   Temp 98 1 °F (36 7 °C) (Oral)   Resp 16   Ht 4' 11" (1 499 m)   Wt 87 5 kg (193 lb)   SpO2 96%   Breastfeeding? No   BMI 38 98 kg/m²          Physical Exam   Constitutional:   She is walking with a walker, she looks very steady on her feet  HENT:   Head: Normocephalic  Eyes: Pupils are equal, round, and reactive to light  EOM are normal    Neck: Neck supple  Cardiovascular: Normal rate and regular rhythm  Pulmonary/Chest: Effort normal    Abdominal: Soft     Musculoskeletal: She exhibits edema (Mild, that improved after elevating the leg in this office  )  Normal decreased range of motion and pain with movement of the knee after surgery  Healing very well, no sign of infection and the surgical area  Neurological: She is alert  Skin: Skin is warm and dry  No sign of infections,   Psychiatric: She has a normal mood and affect   Her behavior is normal

## 2019-12-09 NOTE — ASSESSMENT & PLAN NOTE
Patient is doing well after surgery, no signs of infection, no distress from pain  She is really walking and doing her medications as recommended  She is injecting Lovenox daily  I encouraged the patient to finish Lovenox treatment ends on December 26, 2019  I did conciliation of her meds and we discussed about chronic conditions and trying to control her polypharmacy  She is a diabetic patient, and suffer hypertension  She has obesity eating and is losing weight nicely under Semaglutide (Ozempic)  She will return one month to follow up her coordination of medications and her chronic conditions

## 2019-12-12 ENCOUNTER — OFFICE VISIT (OUTPATIENT)
Dept: OBGYN CLINIC | Facility: MEDICAL CENTER | Age: 74
End: 2019-12-12

## 2019-12-12 ENCOUNTER — APPOINTMENT (OUTPATIENT)
Dept: RADIOLOGY | Facility: MEDICAL CENTER | Age: 74
End: 2019-12-12
Payer: COMMERCIAL

## 2019-12-12 VITALS
DIASTOLIC BLOOD PRESSURE: 64 MMHG | HEIGHT: 59 IN | BODY MASS INDEX: 39.03 KG/M2 | SYSTOLIC BLOOD PRESSURE: 105 MMHG | WEIGHT: 193.6 LBS | HEART RATE: 73 BPM

## 2019-12-12 DIAGNOSIS — Z96.651 STATUS POST TOTAL KNEE REPLACEMENT, RIGHT: ICD-10-CM

## 2019-12-12 DIAGNOSIS — Z96.651 STATUS POST TOTAL KNEE REPLACEMENT, RIGHT: Primary | ICD-10-CM

## 2019-12-12 PROCEDURE — 99024 POSTOP FOLLOW-UP VISIT: CPT | Performed by: ORTHOPAEDIC SURGERY

## 2019-12-12 PROCEDURE — 73562 X-RAY EXAM OF KNEE 3: CPT

## 2019-12-12 RX ORDER — ASPIRIN 325 MG
325 TABLET, DELAYED RELEASE (ENTERIC COATED) ORAL 2 TIMES DAILY
Qty: 28 TABLET | Refills: 0 | Status: SHIPPED | OUTPATIENT
Start: 2019-12-12 | End: 2019-12-21 | Stop reason: SDUPTHER

## 2019-12-12 NOTE — PROGRESS NOTES
Assessment/Plan:  1  Status post total knee replacement, right      Orders Placed This Encounter   Procedures    XR knee 3 vw right non injury    Ambulatory referral to Physical Therapy     Transition to outpatient PT  Pain control prn- oxycodone and tylenol, patient aware to wean away from narcotics  Continue with Lovenox   for DVT prophylaxis for 2 more weeks and then transition to  BID for two weeks   ISAAK stockings as needed for swelling  May shower and let water run over incision, do not submerge incision  Patient aware to call ahead for abx prior to dental appts  Return in about 4 weeks (around 1/9/2020) for PO Right TKA   I answered all of the patient's questions during the visit and provided education of the patient's condition during the visit  The patient verbalized understanding of the information given and agrees with the plan  This note was dictated using [employer] software  It may contain errors including improperly dictated words  Please contact physician directly for any questions  Subjective   Chief Complaint:   Chief Complaint   Patient presents with    Right Knee - Post-op       Yury Langley is a 69 y o  female who presents for 2 week  follow up s/p right TKA on 11/16/19  Patient states she doing well over all  She notes some soreness over anterior right knee  She is using a walker when walking  She states she is currently doing home physical therapy and tolerating the sessions well  She is taking Tylenol prn for pain and at its worse she is taking Oxycodone 5 mg 1-2 times a day  She is on Lovenox for DVT prophylaxis  Denies any chills or fevers  Denies any shortness of breath or tightness in the chest      Review of Systems  ROS:    See HPI for musculoskeletal review     All other systems reviewed are negative     History:  Past Medical History:   Diagnosis Date    Arthritis     [facility]     Endometrial ca ([facility] ) 2000    GERD (gastroesophageal reflux disease)     Hard of hearing     Hyperlipidemia     Hypertension     Obesity     Stress incontinence     Swelling of both lower extremities     Thyroid disease     hypo    Use of cane as ambulatory aid     Wears glasses      Past Surgical History:   Procedure Laterality Date    CATARACT EXTRACTION Bilateral      SECTION      COLONOSCOPY  02/15/2016    small polyp, repeat 5yrs    EGD      HYSTERECTOMY      age 48    JOINT REPLACEMENT Right     knee    OOPHORECTOMY      both removed age 48    ME TOTAL KNEE ARTHROPLASTY Right 2019    Procedure: ARTHROPLASTY KNEE TOTAL;  Surgeon: Bre [facility] DO;  [facility];  Service: Orthopedics     Social History   Social History     Substance and Sexual Activity   Alcohol Use Never    Frequency: Never     Social History     Substance and Sexual Activity   Drug Use No     Social History     Tobacco Use   Smoking Status Former Smoker    Last attempt to quit: 10/28/1971    Years since quittin 1   Smokeless Tobacco Never Used     Family History:   Family History   Problem Relation Age of Onset    Diabetes Father     Hypertension Father     Breast cancer Mother [de-identified]    Breast cancer Sister 46    Endometrial cancer Daughter 52       Current Outpatient Medications on File Prior to Visit   Medication Sig Dispense Refill    acetaminophen (TYLENOL) 650 mg CR tablet Take 1 tablet (650 mg total) by mouth every 8 (eight) hours as needed for mild pain 90 tablet 0    ascorbic acid (VITAMIN C) 500 mg tablet TAKE 1 TABLET (500 MG TOTAL) BY MOUTH DAILY 30 tablet 1    ASPIRIN 81 PO Take 81 mg by mouth      docusate sodium (COLACE) 100 mg capsule Take 1 capsule (100 mg total) by mouth 2 (two) times a day 20 capsule 0    enalapril (VASOTEC) 2 5 mg tablet Take 2 5 mg by mouth daily  1    enoxaparin (LOVENOX) 40 mg/0 4 mL Inject 0 4 mL (40 mg total) under the skin daily 28 Syringe 0    Ergocalciferol (VITAMIN D2 PO) Take 50,000 Units by mouth once a week  ferrous sulfate 325 (65 Fe) mg tablet TAKE 1 TABLET (324 MG TOTAL) BY MOUTH DAILY 30 tablet 1    folic acid (FOLVITE) 1 mg tablet TAKE 1 TABLET (1 MG TOTAL) BY MOUTH DAILY 30 tablet 1    gabapentin (NEURONTIN) 100 mg capsule Take 1 capsule (100 mg total) by mouth 2 (two) times a day 60 capsule 0    glimepiride (AMARYL) 2 mg tablet Take 1 tablet (2 mg total) by mouth daily with dinner 30 tablet 5    JARDIANCE 25 MG TABS Take 25 mg by mouth daily  2    levothyroxine 50 mcg tablet TAKE ONE TABLET IN THE MORNING (MONDAY TO FRIDAY)  1    levothyroxine 75 mcg tablet Take 75 mcg by mouth daily      lovastatin (MEVACOR) 40 MG tablet Take 40 mg by mouth daily at bedtime  3    omeprazole (PriLOSEC) 20 mg delayed release capsule Take 20 mg by mouth daily      OZEMPIC, 1 MG/DOSE, 2 MG/1 5ML SOPN 1 5 mg once a week   2     No current facility-administered medications on file prior to visit        Allergies   Allergen Reactions    No Known Allergies         Objective     /64   Pulse 73   Ht 4' 11" (1 499 m)   Wt 87 8 kg (193 lb 9 6 oz)   BMI 39 10 kg/m²      PE:  AAOx 3  WDWN  Hearing intact, no drainage from eyes  no audible wheezing  no abdominal distension  LE compartments soft, AT/GS intact    Ortho Exam:  right Knee:   INC: C/D/I, No erythema, mild swelling  AROM:0 - 90 degrees    Imaging Studies: I have personally reviewed pertinent films in PACS  XR right knee:  S/p TKA, adequate alignment      Scribe Attestation    I,:   Chuck Camarena am acting as a scribe while in the presence of the attending physician :        I,:   Carolann Burdick DO personally performed the services described in this documentation    as scribed in my presence :

## 2019-12-16 ENCOUNTER — TELEPHONE (OUTPATIENT)
Dept: OBGYN CLINIC | Facility: HOSPITAL | Age: 74
End: 2019-12-16

## 2019-12-16 NOTE — TELEPHONE ENCOUNTER
Patient sees Dr Quinten Dyer  Physical therapist is calling for clarification on therapy  He is not sure if he can finish her last two home visits before sending her to outpatient   Please call Gus Sorto

## 2019-12-16 NOTE — TELEPHONE ENCOUNTER
Spoke with Joe, physical therapist  Pt is ok to finish home PT this week then transition to outpatient

## 2019-12-17 ENCOUNTER — TELEPHONE (OUTPATIENT)
Dept: OBGYN CLINIC | Facility: HOSPITAL | Age: 74
End: 2019-12-17

## 2019-12-17 NOTE — TELEPHONE ENCOUNTER
Patient sees Dr José Miguel arreola from [facility] wanted to let the Dr know she is discharging her from home health as she doesn't feel she needs it any longer  Also, she doesn't think the patient has been compliant with taking her vitamins and would like to know if she should just discontinue taking them altogether  She believes she has been taking the gabapentin as prescribed and would also like to know if she should keep taking this or discontinue  She wants to provide all the information to the patient before she is discharged        CB: [phone]

## 2019-12-17 NOTE — TELEPHONE ENCOUNTER
I returned Karin's phone call  No answer  Her voice mail box is full and I am unable to leave a message  Patient does not need to take vitamins any more  She can also stop her gabapentin

## 2019-12-21 DIAGNOSIS — Z96.651 STATUS POST TOTAL KNEE REPLACEMENT, RIGHT: ICD-10-CM

## 2019-12-23 ENCOUNTER — TELEPHONE (OUTPATIENT)
Dept: OBGYN CLINIC | Facility: MEDICAL CENTER | Age: 74
End: 2019-12-23

## 2019-12-23 RX ORDER — ASPIRIN 325 MG
325 TABLET, DELAYED RELEASE (ENTERIC COATED) ORAL 2 TIMES DAILY
Qty: 28 TABLET | Refills: 0 | Status: SHIPPED | OUTPATIENT
Start: 2019-12-23 | End: 2021-08-04 | Stop reason: ALTCHOICE

## 2019-12-23 NOTE — TELEPHONE ENCOUNTER
I spoke with Ms Andres Garcia today  I let her know she can stop lovenox  She should start ASA for 2 weeks  Rx sent to pharmacy  I let her know that she can drive 4-6 weeks after surgery as long as she feels strong enough and not using narcotics prior to driving  She should practice in parking lot first   She verbalized understanding

## 2019-12-24 DIAGNOSIS — M17.11 PRIMARY OSTEOARTHRITIS OF RIGHT KNEE: ICD-10-CM

## 2019-12-27 RX ORDER — FERROUS SULFATE 325(65) MG
TABLET ORAL
Qty: 30 TABLET | Refills: 0 | OUTPATIENT
Start: 2019-12-27

## 2019-12-27 RX ORDER — FOLIC ACID 1 MG/1
1 TABLET ORAL DAILY
Qty: 30 TABLET | Refills: 0 | OUTPATIENT
Start: 2019-12-27

## 2019-12-27 RX ORDER — ASCORBIC ACID 500 MG
500 TABLET ORAL DAILY
Qty: 30 TABLET | Refills: 0 | OUTPATIENT
Start: 2019-12-27

## 2020-01-01 DIAGNOSIS — Z96.651 STATUS POST TOTAL KNEE REPLACEMENT, RIGHT: ICD-10-CM

## 2020-01-02 DIAGNOSIS — Z96.651 STATUS POST RIGHT KNEE REPLACEMENT: Primary | ICD-10-CM

## 2020-01-02 RX ORDER — ASPIRIN 325 MG
325 TABLET, DELAYED RELEASE (ENTERIC COATED) ORAL 2 TIMES DAILY
Qty: 28 TABLET | Refills: 0 | OUTPATIENT
Start: 2020-01-02

## 2020-01-02 RX ORDER — ASPIRIN 325 MG
325 TABLET, DELAYED RELEASE (ENTERIC COATED) ORAL 2 TIMES DAILY
Qty: 28 TABLET | Refills: 0 | Status: SHIPPED | OUTPATIENT
Start: 2020-01-02 | End: 2020-01-13

## 2020-01-02 NOTE — TELEPHONE ENCOUNTER
Sent in prescription for  BID X 2 weeks  Patient states she did not fill the first script that Dr Keo Zuniga sent on 12/23  She verbalized understanding

## 2020-01-06 ENCOUNTER — EVALUATION (OUTPATIENT)
Dept: PHYSICAL THERAPY | Facility: REHABILITATION | Age: 75
End: 2020-01-06
Payer: COMMERCIAL

## 2020-01-06 DIAGNOSIS — Z96.651 STATUS POST TOTAL KNEE REPLACEMENT, RIGHT: Primary | ICD-10-CM

## 2020-01-06 PROCEDURE — 97162 PT EVAL MOD COMPLEX 30 MIN: CPT | Performed by: PHYSICAL THERAPIST

## 2020-01-06 PROCEDURE — 97112 NEUROMUSCULAR REEDUCATION: CPT | Performed by: PHYSICAL THERAPIST

## 2020-01-06 NOTE — PROGRESS NOTES
PT Evaluation     Today's date: 2020  Patient name: Clint Junior  : 1945  MRN: [id number]  Referring provider: Delvis Hawk DO  Dx:   Encounter Diagnosis     ICD-10-CM    1  Status post total knee replacement, right Z96 651                   Assessment  Assessment details: Pt is a 68 yo female presenting to PT approximately 5 weeks s/p a right total knee arthroplasty performed on 2019  Pt has participated in 6 home PT sessions since her surgery  Pt presents with limited knee range of motion, patellar mobility, strength, flexibility and balance  Pt with impaired gait symmetry, activity tolerance, and balance  Pt has considerable right knee and lower leg edema  This pt ambulates with a single point cane, with a goal of being able to walk without an AD  PT should focus on strengthening her R LE, increasing R knee AROM, and reducing edema throughout the R knee  This pt would greatly benefit from skilled PT, so she can maintain independence with ADLs and transition to walking without an AD  Impairments: abnormal or restricted ROM, impaired physical strength and pain with function  Barriers to therapy: Pt's first language is Greek, also speaks [address]  Understanding of Dx/Px/POC: good   Prognosis: good    Goals  ST  Pt will have R knee AROM equal to the L knee  2  Pt will increase knee flexion and extension strength by 1/2 MMT grades  3  Pt will have equal girth measurements between the R and L knees  LT  Pt will be independent with a home exercise program   2  Pt will be able to ascend and descend 1 flight of stairs without knee pain  3  Pt will be able to walk without an AD  4  Pt will be able to maintain semi-tandem stance for 30 seconds or greater       Plan  Patient would benefit from: skilled physical therapy  Planned modality interventions: cryotherapy, thermotherapy: hydrocollator packs and TENS  Planned therapy interventions: manual therapy, massage, neuromuscular re-education, strengthening, stretching, therapeutic exercise, home exercise program, gait training, balance, balance/weight bearing training, therapeutic activities and joint mobilization  Frequency: 2x week  Duration in weeks: 12  Treatment plan discussed with: patient        Subjective Evaluation    History of Present Illness  Date of surgery: 2019  Mechanism of injury: Pt had a R TKA on 2019  Pt has difficulty going up the stairs and getting in and out of cars  Pt reports that she only has very minor pain when standing or walking  She feels more stiffness rather than pain  Pt had 6 visits with home PT since her surgery  Pt has been able to clean her house since having surgery  Recurrent probem    Quality of life: good    Pain  Current pain rating: 3  At best pain ratin  At worst pain ratin  Relieving factors: ice and medications  Aggravating factors: sitting  Progression: improved    Social Support  Stairs in house: yes   8  Patient lives at: 5 story house  Lives with: adult children    Exercise history: never  Life stress: retired       Diagnostic Tests  X-ray: normal  Treatments  Previous treatment: physical therapy (6 home PT visits)  Current treatment: medication  Discharged from (in last 30 days): home health care  Patient Goals  Patient goals for therapy: decreased pain, improved balance, increased motion, increased strength, independence with ADLs/IADLs and decreased edema  Patient goal: walk without a cane        Objective     Observations     Right Knee   Positive for incision  Additional Observation Details  Anterior knee incision with edges approximated and healing, with localized erythema, no drainage present  Moderate right lower leg edema       Active Range of Motion   Left Knee   Flexion: 117 degrees   Extension: -5 degrees     Right Knee   Flexion: 108 degrees with pain  Extension: -8 degrees   Extensor lag: -15 degrees     Passive Range of Motion Right Knee   Flexion: 115 degrees with pain  Extension: -5 degrees     Mobility   Patellar Mobility:   Left Knee   WFL: medial, lateral, superior and inferior  Right Knee   WFL: medial and inferior  Hypomobile: lateral and superior     Strength/Myotome Testing     Left Knee   Flexion: 4-  Extension: 5  Quadriceps contraction: good    Right Knee   Flexion: 3  Extension: 4  Quadriceps contraction: fair    Tests     Additional Tests Details  Luther's Sign: negative bilaterally    Swelling     Left Knee Girth Measurement (cm)   Joint line: 43 cm  10 cm above joint line: 53 cm  10 cm below joint line: 46 cm    Right Knee Girth Measurement (cm)   Joint line: 46 cm  10 cm above joint line: 53 cm  10 cm below joint line: 46 cm    Ambulation     Observational Gait   Gait: antalgic and asymmetric   Increased left stance time  Decreased walking speed, stride length and right stance time  Quality of Movement During Gait   Trunk    Trunk (Left): Positive left lateral lean over stance limb  Knee    Knee (Right): Positive stiff knee  Functional Assessment        Comments  Pt was able to maintain feet together and semi tandem stance w/ EO and EC for 30 seconds, but was unable to assume SLS position  During all balance tests, pt displayed a sway to the left side               Precautions: DM, HTN, GERD    Initial FOTO = 44  Manual  1/6            Knee PROM             Patellar PROM                                                        Exercise Diary  1/6            Bike NV            Quad sets 10x5"            Heel slides 10x            Hamstring stretch - longsit NV            Gastroc stretch with strap 30"            SAQ NV            Bridge NV            Seated AAROM knee flex NV            LAQ NV            Ham curls NV            Mini Squat             Step Ups: Forward             Step Ups: Lateral             Semi-Tandem Balance             FTEC - Airex Modalities  1/6            CP with extension stretch  Post-Tx

## 2020-01-09 ENCOUNTER — OFFICE VISIT (OUTPATIENT)
Dept: OBGYN CLINIC | Facility: MEDICAL CENTER | Age: 75
End: 2020-01-09
Payer: COMMERCIAL

## 2020-01-09 ENCOUNTER — OFFICE VISIT (OUTPATIENT)
Dept: PHYSICAL THERAPY | Facility: REHABILITATION | Age: 75
End: 2020-01-09
Payer: COMMERCIAL

## 2020-01-09 VITALS
BODY MASS INDEX: 38.3 KG/M2 | WEIGHT: 190 LBS | DIASTOLIC BLOOD PRESSURE: 73 MMHG | HEART RATE: 76 BPM | HEIGHT: 59 IN | SYSTOLIC BLOOD PRESSURE: 123 MMHG

## 2020-01-09 DIAGNOSIS — Z96.651 STATUS POST RIGHT KNEE REPLACEMENT: Primary | ICD-10-CM

## 2020-01-09 DIAGNOSIS — L03.90 CELLULITIS, UNSPECIFIED CELLULITIS SITE: ICD-10-CM

## 2020-01-09 DIAGNOSIS — Z96.651 STATUS POST TOTAL KNEE REPLACEMENT, RIGHT: Primary | ICD-10-CM

## 2020-01-09 PROCEDURE — 97112 NEUROMUSCULAR REEDUCATION: CPT

## 2020-01-09 PROCEDURE — 99213 OFFICE O/P EST LOW 20 MIN: CPT | Performed by: ORTHOPAEDIC SURGERY

## 2020-01-09 PROCEDURE — 97140 MANUAL THERAPY 1/> REGIONS: CPT

## 2020-01-09 PROCEDURE — 97110 THERAPEUTIC EXERCISES: CPT

## 2020-01-09 RX ORDER — CEPHALEXIN 500 MG/1
500 CAPSULE ORAL EVERY 8 HOURS SCHEDULED
Qty: 30 CAPSULE | Refills: 0 | Status: SHIPPED | OUTPATIENT
Start: 2020-01-09 | End: 2020-01-19

## 2020-01-09 NOTE — PROGRESS NOTES
Assessment/Plan:  1  Status post right knee replacement    2  Cellulitis, unspecified cellulitis site      No orders of the defined types were placed in this encounter  6 weeks s/p right TKA - 19  · TKA doing well  · Monitor incision  · Patient placed on Kelfex 500mg TID for 10 days for concern for right lower leg cellulitis  · Follow up in one week    · Patient is aware to call our office, call PCPs office, or go to ER if symptoms worsen and do not respond to antibiotics    Return in about 1 week (around 2020)  I answered all of the patient's questions during the visit and provided education of the patient's condition during the visit  The patient verbalized understanding of the information given and agrees with the plan  This note was dictated using [employer] software  It may contain errors including improperly dictated words  Please contact physician directly for any questions  Subjective   Chief Complaint:   Chief Complaint   Patient presents with    Right Knee - Post-op, Follow-up       Claudia Smith is a 69 y o  female who presents for 6 weeks s/p right TKA, 19  She is doing well  Today she complains of mild right knee discomfort  She does use a cane for ambulation  She denies medications for pain  She continues physical therapy  She denies fever or chills  Review of Systems  ROS:    See HPI for musculoskeletal review     All other systems reviewed are negative     History:  Past Medical History:   Diagnosis Date    Arthritis     [facility]     [facility]ometrial ca [facility]     GERD (gastroesophageal reflux disease)     Hard of hearing     Hyperlipidemia     Hypertension     Obesity     Stress incontinence     Swelling of both lower extremities     Thyroid disease     hypo    Use of cane as ambulatory aid     Wears glasses      Past Surgical History:   Procedure Laterality Date    CATARACT EXTRACTION Bilateral      SECTION      COLONOSCOPY 02/15/2016    small polyp, repeat 5yrs    EGD      HYSTERECTOMY      age 48    JOINT REPLACEMENT Right     knee    OOPHORECTOMY      both removed age 48    [facility] TOTAL KNEE ARTHROPLASTY Right 2019    Procedure: ARTHROPLASTY KNEE TOTAL;  Surgeon: Martin Moreau DO;  Location: [facility];  Service: Orthopedics     Social History   Social History     Substance and Sexual Activity   Alcohol Use Never    Frequency: Never     Social History     Substance and Sexual Activity   Drug Use No     Social History     Tobacco Use   Smoking Status Former Smoker    Last attempt to quit: 10/28/1971    Years since quittin 2   Smokeless Tobacco Never Used     Family History:   Family History   Problem Relation Age of Onset    Diabetes Father     Hypertension Father     Breast cancer Mother [de-identified]    Breast cancer Sister 46    Endometrial cancer Daughter 52       Meds/Allergies     (Not in a hospital admission)  Allergies   Allergen Reactions    No Known Allergies           Objective     /73   Pulse 76   Ht 4' 11" (1 499 m)   Wt 86 2 kg (190 lb)   BMI 38 38 kg/m²      PE:  AAOx 3  WDWN  Hearing intact, no drainage from eyes  no audible wheezing  no abdominal distension  LE compartments soft, AT/GS intact    Ortho Exam:  right Knee:   INC: healed, No erythema, mild swelling  Possible spitting suture over far inferior border of incision    AROM:0 - 110 degrees    R lower leg mild diffuse erythema lower leg  Negative negrito's sign      Scribe Attestation    I,:   Trip Ochoa am acting as a scribe while in the presence of the attending physician :        I,:   Martin Moreau DO personally performed the services described in this documentation    as scribed in my presence :

## 2020-01-09 NOTE — PROGRESS NOTES
Daily Note     Today's date: 2020  Patient name: Charles Lizama  : 1945  MRN: [id number]  Referring provider: Aria Elliott DO  Dx:   Encounter Diagnosis     ICD-10-CM    1  Status post total knee replacement, right Z96 651        Start Time: 3133  Stop Time: 1540  Total time in clinic (min): 45 minutes    Subjective: Pt reports she is feeling good at the moment  Pt states she has some stiffness  Objective: See treatment diary below      Assessment: Tolerated treatment well  Patient demonstrated fatigue post treatment and would benefit from continued PT  Pt performed exercises as noted with no signs of increased pain or adverse symptoms  Pt shows appropriate limitations with PROM  Continue to progress as able  Plan: Continue per plan of care        Precautions: DM, HTN, GERD    Initial FOTO = 44  Manual             Knee PROM  HY           Patellar PROM  HY                                                      Exercise Diary             Bike NV 5'           Quad sets 10x5" 10x5"           Heel slides 10x 10x10"           Hamstring stretch - longsit NV 3x30"           Gastroc stretch with strap 30" 3x30"           SAQ NV 20x5"           Bridge NV 10x5"           Seated AAROM knee flex NV 10x5"           LAQ NV 3x10           Ham curls NV 20x           Mini Squat  nv           Step Ups: Forward  nv           Step Ups: Lateral  nv           Semi-Tandem Balance  nv           FTEC - Airex  nv                                                                                                                       Modalities             CP with extension stretch  Post-Tx  np

## 2020-01-13 ENCOUNTER — OFFICE VISIT (OUTPATIENT)
Dept: GASTROENTEROLOGY | Facility: CLINIC | Age: 75
End: 2020-01-13
Payer: COMMERCIAL

## 2020-01-13 VITALS
HEART RATE: 65 BPM | TEMPERATURE: 98.3 F | HEIGHT: 59 IN | SYSTOLIC BLOOD PRESSURE: 125 MMHG | DIASTOLIC BLOOD PRESSURE: 63 MMHG | WEIGHT: 190.2 LBS | BODY MASS INDEX: 38.34 KG/M2

## 2020-01-13 DIAGNOSIS — K59.04 CHRONIC IDIOPATHIC CONSTIPATION: Primary | ICD-10-CM

## 2020-01-13 DIAGNOSIS — K59.04 CHRONIC IDIOPATHIC CONSTIPATION: ICD-10-CM

## 2020-01-13 DIAGNOSIS — Z96.651 STATUS POST RIGHT KNEE REPLACEMENT: ICD-10-CM

## 2020-01-13 DIAGNOSIS — E66.01 MORBID OBESITY (HCC): ICD-10-CM

## 2020-01-13 DIAGNOSIS — K21.9 GASTROESOPHAGEAL REFLUX DISEASE WITHOUT ESOPHAGITIS: ICD-10-CM

## 2020-01-13 DIAGNOSIS — K22.2 ESOPHAGEAL STRICTURE: Primary | ICD-10-CM

## 2020-01-13 PROBLEM — Z86.0100 HISTORY OF COLON POLYPS: Status: ACTIVE | Noted: 2020-01-13

## 2020-01-13 PROBLEM — Z86.010 HISTORY OF COLON POLYPS: Status: ACTIVE | Noted: 2020-01-13

## 2020-01-13 PROCEDURE — 99204 OFFICE O/P NEW MOD 45 MIN: CPT | Performed by: INTERNAL MEDICINE

## 2020-01-13 RX ORDER — ASPIRIN 325 MG
TABLET, DELAYED RELEASE (ENTERIC COATED) ORAL
Qty: 28 TABLET | Refills: 0 | Status: SHIPPED | OUTPATIENT
Start: 2020-01-13 | End: 2020-01-22

## 2020-01-13 RX ORDER — POLYETHYLENE GLYCOL 3350 17 G/17G
POWDER, FOR SOLUTION ORAL
Qty: 5 G | Refills: 0 | Status: SHIPPED | COMMUNITY
Start: 2020-01-13 | End: 2020-01-28

## 2020-01-13 NOTE — H&P (VIEW-ONLY)
[facility] - Outpatient Consultation  Vicky Vazquez 69 y o  female MRN: [id number]  Encounter: [id number]          ASSESSMENT AND PLAN:    Vicky Vazquez is a 69 y o  female with GERD, DM, HTN, HLD, and hypothyroidism here for the evaluation and treatment of esophogeal atresia  1  Dysphagia  Patient reports food getting stuck in her throat which sometimes leads to her choking and gasping for air  She has difficulty swallowing both liquids and solids  Patient reports that when she does occasioanly throw up, she feels relief from the coughing and is able to sleep  She denies any regurgitation  Patient had a barium swallow on 5/30/19 which revealed a short segment area of significant narrowing at the gastroesophageal junction causing significant delay of transit of barium from the esophagus to the stomach which shows esophageal dysmotility  We discussed the importance of chewing her food thoroughly  I will schedule her for an EGD for further evaluation  Risks and benefits of the procedure were discussed  Risks include, but are not limited to infection, bleeding, perforation, missed lesions  She is agreeable to the procedure  2  Chronic constipation  Patient reports that she is unable to have a BM every day and when she does has one after a couple of days, it'll be diarrhea  Patient had a colonoscopy 3 years ago at [facility] on 2/15/16 which revealed one small sessile polyp in the sigmoid, otherwise normal exam   We discussed the importance of increased water intake and physical activity such as walking  I suggested increasing fiber intake and using Citrucel  Will schedule her for colonoscopy    3  Gastroesophageal Reflux Disease:  Continue taking Omeprazole 20 mg 30 minutes before breakfast   We reviewed reflux precautions    4  Diabetes Mellitus:  Managed by PCP      Follow up in 2 months     ______________________________________________________________________    HPI:  Thierry Coca Cristel Weber is a 69 y o  female with GERD, DM, HTN, HLD, and hypothyroidism here for the evaluation and treatment of esophogeal narrowing, dysphagia, and diarrhea  Her  is here today who explained that for a couple of years now patient has been dealing with food getting stuck in her throat which sometimes leads to her choking and gasping for air  She has difficulty swallowing both liquids and solids  She reports that when she does occasioanly throw up, she feels relief from the coughing and is able to sleep  She denies any regurgitation  Patient had a barium swallow on 5/30/19 which revealed a short segment area of significant narrowing at the gastroesophageal junction causing significant delay of transit of barium from the esophagus to the stomach which shows esophageal dysmotility  Patient reports that she is unable to have a BM every day and when she does has one after a couple of days, it'll be diarrhea  This morning she was unable to have  BM due to constipation  She is also unable to pass gas as she needs  Patient had a colonoscopy 3 years ago at [facility] on 2/15/16 which revealed one small sessile polyp in the sigmoid, otherwise normal exam  Patient reports she has been having stomach problems since 2016  Her  reports hat she is also losing weight which may be due to her DM  REVIEW OF SYSTEMS:    CONSTITUTIONAL: Denies any fever, chills, rigors, and weight loss  HEENT: No earache or tinnitus  Denies hearing loss or visual disturbances  CARDIOVASCULAR: No chest pain or palpitations  RESPIRATORY: Denies any cough, hemoptysis, shortness of breath or dyspnea on exertion  GASTROINTESTINAL: As noted in the History of Present Illness  GENITOURINARY: No problems with urination  Denies any hematuria or dysuria  NEUROLOGIC: No dizziness or vertigo, denies headaches  MUSCULOSKELETAL: Denies any muscle or joint pain  SKIN: Denies skin rashes or itching     ENDOCRINE: Denies excessive thirst  Denies intolerance to heat or cold  PSYCHOSOCIAL: Denies depression or anxiety  Denies any recent memory loss         Historical Information   Past Medical History:   Diagnosis Date    Arthritis     [facility] [address] [facility]     [facility]     GERD (gastroesophageal reflux disease)     Hard of hearing     Hyperlipidemia     Hypertension     Obesity     Stress incontinence     Swelling of both lower extremities     Thyroid disease     hypo    Use of cane as ambulatory aid     Wears glasses      Past Surgical History:   Procedure Laterality Date    CATARACT EXTRACTION Bilateral      SECTION      COLONOSCOPY  02/15/2016    small polyp, repeat 5yrs    EGD      HYSTERECTOMY      age 48    JOINT REPLACEMENT Right     knee    OOPHORECTOMY      both removed age 48    [place] TOTAL KNEE ARTHROPLASTY Right 2019    Procedure: ARTHROPLASTY KNEE TOTAL;  Surgeon: Julieta Avila DO;  Location: [facility];  Service: Orthopedics     Social History   Social History     Substance and Sexual Activity   Alcohol Use Never    Frequency: Never     Social History     Substance and Sexual Activity   Drug Use No     Social History     Tobacco Use   Smoking Status Former Smoker    Last attempt to quit: 10/28/1971    Years since quittin 2   Smokeless Tobacco Never Used     Family History   Problem Relation Age of Onset    Diabetes Father     Hypertension Father     Breast cancer Mother [de-identified]    Breast cancer Sister 46    Endometrial cancer Daughter 52       Meds/Allergies       Current Outpatient Medications:     acetaminophen (TYLENOL) 650 mg CR tablet    ascorbic acid (VITAMIN C) 500 mg tablet    aspirin (ECOTRIN) 325 mg EC tablet    aspirin (ECOTRIN) 325 mg EC tablet    ASPIRIN 81 PO    cephalexin (KEFLEX) 500 mg capsule    docusate sodium (COLACE) 100 mg capsule    enalapril (VASOTEC) 2 5 mg tablet    enoxaparin (LOVENOX) 40 mg/0 4 mL    Ergocalciferol (VITAMIN D2 PO)   ferrous sulfate 325 (65 Fe) mg tablet    folic acid (FOLVITE) 1 mg tablet    gabapentin (NEURONTIN) 100 mg capsule    glimepiride (AMARYL) 2 mg tablet    JARDIANCE 25 MG TABS    levothyroxine 50 mcg tablet    levothyroxine 75 mcg tablet    lovastatin (MEVACOR) 40 MG tablet    omeprazole (PriLOSEC) 20 mg delayed release capsule    OZEMPIC, 1 MG/DOSE, 2 MG/1 5ML SOPN    Allergies   Allergen Reactions    No Known Allergies            Objective     not currently breastfeeding  There is no height or weight on file to calculate BMI  PHYSICAL EXAM:      General Appearance:   Alert, cooperative, no distress   HEENT:   Normocephalic, atraumatic, anicteric      Neck:  Supple, symmetrical, trachea midline   Lungs:   Clear to auscultation bilaterally; no rales, rhonchi or wheezing; respirations unlabored    Heart[de-identified]   Regular rate and rhythm; no murmur, rub, or gallop  Abdomen:   Soft, non-tender, non-distended; normal bowel sounds; no masses, no organomegaly    Genitalia:   Deferred    Rectal:   Deferred    Extremities:  No cyanosis, clubbing or edema    Pulses:  2+ and symmetric    Skin:  No jaundice, rashes, or lesions    Lymph nodes:  No palpable cervical lymphadenopathy        Lab Results:   No visits with results within 1 Day(s) from this visit     Latest known visit with results is:   Admission on 11/26/2019, Discharged on 11/28/2019   Component Date Value    POC Glucose 11/26/2019 113     POC Glucose 11/26/2019 113     POC Glucose 11/26/2019 195*    POC Glucose 11/26/2019 170*    WBC 11/27/2019 10 93*    RBC 11/27/2019 4 19     Hemoglobin 11/27/2019 11 6     Hematocrit 11/27/2019 36 3     MCV 11/27/2019 87     MCH 11/27/2019 27 7     MCHC 11/27/2019 32 0     RDW 11/27/2019 15 3*    Platelets 50/24/6804 277     MPV 11/27/2019 10 8     Sodium 11/27/2019 140     Potassium 11/27/2019 4 4     Chloride 11/27/2019 110*    CO2 11/27/2019 23     ANION GAP 11/27/2019 7     BUN 11/27/2019 15  Creatinine 11/27/2019 0 81     Glucose 11/27/2019 110     Calcium 11/27/2019 8 8     eGFR 11/27/2019 73     POC Glucose 11/27/2019 136     POC Glucose 11/27/2019 148*    POC Glucose 11/27/2019 165*    POC Glucose 11/27/2019 175*    WBC 11/28/2019 12 51*    RBC 11/28/2019 4 09     Hemoglobin 11/28/2019 11 1*    Hematocrit 11/28/2019 35 1     MCV 11/28/2019 86     MCH 11/28/2019 27 1     MCHC 11/28/2019 31 6     RDW 11/28/2019 15 3*    Platelets 81/47/9659 260     MPV 11/28/2019 11 0     Sodium 11/28/2019 135*    Potassium 11/28/2019 4 2     Chloride 11/28/2019 105     CO2 11/28/2019 23     ANION GAP 11/28/2019 7     BUN 11/28/2019 13     Creatinine 11/28/2019 0 78     Glucose 11/28/2019 130     Calcium 11/28/2019 9 5     eGFR 11/28/2019 76     POC Glucose 11/28/2019 107     POC Glucose 11/28/2019 150*         Radiology Results:   No results found  Attestation:   By signing my name below, I, Edy Cody, attest that this documentation has    been prepared under the direction and in the presence of TERE Hunter  Electronically Signed: Ruth Her  1/13/2020        I, Cherise Rider, personally performed the services described in this    documentation  All medical record entries made by the scribe were at my    direction and in my presence  I have reviewed the chart and discharge    instructions and agree that the record reflects my personal performance and is    accurate and complete   TERE Hunter  1/13/2020

## 2020-01-13 NOTE — PROGRESS NOTES
[facility] - Outpatient Consultation  Christian Reyes 69 y o  female MRN: [id number]  Encounter: [id number]          ASSESSMENT AND PLAN:    Christian Reyes is a 69 y o  female with GERD, DM, HTN, HLD, and hypothyroidism here for the evaluation and treatment of esophogeal atresia  1  Dysphagia  Patient reports food getting stuck in her throat which sometimes leads to her choking and gasping for air  She has difficulty swallowing both liquids and solids  Patient reports that when she does occasioanly throw up, she feels relief from the coughing and is able to sleep  She denies any regurgitation  Patient had a barium swallow on 5/30/19 which revealed a short segment area of significant narrowing at the gastroesophageal junction causing significant delay of transit of barium from the esophagus to the stomach which shows esophageal dysmotility  We discussed the importance of chewing her food thoroughly  I will schedule her for an EGD for further evaluation  Risks and benefits of the procedure were discussed  Risks include, but are not limited to infection, bleeding, perforation, missed lesions  She is agreeable to the procedure  2  Chronic constipation  Patient reports that she is unable to have a BM every day and when she does has one after a couple of days, it'll be diarrhea  Patient had a colonoscopy 3 years ago at [facility] on 2/15/16 which revealed one small sessile polyp in the sigmoid, otherwise normal exam   We discussed the importance of increased water intake and physical activity such as walking  I suggested increasing fiber intake and using Citrucel  Will schedule her for colonoscopy    3  Gastroesophageal Reflux Disease:  Continue taking Omeprazole 20 mg 30 minutes before breakfast   We reviewed reflux precautions    4  Diabetes Mellitus:  Managed by PCP      Follow up in 2 months     ______________________________________________________________________    HPI:  Errol Castro Andres Garcia is a 69 y o  female with GERD, DM, HTN, HLD, and hypothyroidism here for the evaluation and treatment of esophogeal narrowing, dysphagia, and diarrhea  Her  is here today who explained that for a couple of years now patient has been dealing with food getting stuck in her throat which sometimes leads to her choking and gasping for air  She has difficulty swallowing both liquids and solids  She reports that when she does occasioanly throw up, she feels relief from the coughing and is able to sleep  She denies any regurgitation  Patient had a barium swallow on 5/30/19 which revealed a short segment area of significant narrowing at the gastroesophageal junction causing significant delay of transit of barium from the esophagus to the stomach which shows esophageal dysmotility  Patient reports that she is unable to have a BM every day and when she does has one after a couple of days, it'll be diarrhea  This morning she was unable to have  BM due to constipation  She is also unable to pass gas as she needs  Patient had a colonoscopy 3 years ago at [facility] on 2/15/16 which revealed one small sessile polyp in the sigmoid, otherwise normal exam  Patient reports she has been having stomach problems since 2016  Her  reports hat she is also losing weight which may be due to her DM  REVIEW OF SYSTEMS:    CONSTITUTIONAL: Denies any fever, chills, rigors, and weight loss  HEENT: No earache or tinnitus  Denies hearing loss or visual disturbances  CARDIOVASCULAR: No chest pain or palpitations  RESPIRATORY: Denies any cough, hemoptysis, shortness of breath or dyspnea on exertion  GASTROINTESTINAL: As noted in the History of Present Illness  GENITOURINARY: No problems with urination  Denies any hematuria or dysuria  NEUROLOGIC: No dizziness or vertigo, denies headaches  MUSCULOSKELETAL: Denies any muscle or joint pain  SKIN: Denies skin rashes or itching     ENDOCRINE: Denies excessive thirst  Denies intolerance to heat or cold  PSYCHOSOCIAL: Denies depression or anxiety  Denies any recent memory loss         Historical Information   Past Medical History:   Diagnosis Date    Arthritis     [facility] [address] [facility]     [facility]     GERD (gastroesophageal reflux disease)     Hard of hearing     Hyperlipidemia     Hypertension     Obesity     Stress incontinence     Swelling of both lower extremities     Thyroid disease     hypo    Use of cane as ambulatory aid     Wears glasses      Past Surgical History:   Procedure Laterality Date    CATARACT EXTRACTION Bilateral      SECTION      COLONOSCOPY  02/15/2016    small polyp, repeat 5yrs    EGD      HYSTERECTOMY      age 48    JOINT REPLACEMENT Right     knee    OOPHORECTOMY      both removed age 48    [place] TOTAL KNEE ARTHROPLASTY Right 2019    Procedure: ARTHROPLASTY KNEE TOTAL;  Surgeon: Cruz Granado DO;  Location: [facility];  Service: Orthopedics     Social History   Social History     Substance and Sexual Activity   Alcohol Use Never    Frequency: Never     Social History     Substance and Sexual Activity   Drug Use No     Social History     Tobacco Use   Smoking Status Former Smoker    Last attempt to quit: 10/28/1971    Years since quittin 2   Smokeless Tobacco Never Used     Family History   Problem Relation Age of Onset    Diabetes Father     Hypertension Father     Breast cancer Mother [de-identified]    Breast cancer Sister 46    Endometrial cancer Daughter 52       Meds/Allergies       Current Outpatient Medications:     acetaminophen (TYLENOL) 650 mg CR tablet    ascorbic acid (VITAMIN C) 500 mg tablet    aspirin (ECOTRIN) 325 mg EC tablet    aspirin (ECOTRIN) 325 mg EC tablet    ASPIRIN 81 PO    cephalexin (KEFLEX) 500 mg capsule    docusate sodium (COLACE) 100 mg capsule    enalapril (VASOTEC) 2 5 mg tablet    enoxaparin (LOVENOX) 40 mg/0 4 mL    Ergocalciferol (VITAMIN D2 PO)   ferrous sulfate 325 (65 Fe) mg tablet    folic acid (FOLVITE) 1 mg tablet    gabapentin (NEURONTIN) 100 mg capsule    glimepiride (AMARYL) 2 mg tablet    JARDIANCE 25 MG TABS    levothyroxine 50 mcg tablet    levothyroxine 75 mcg tablet    lovastatin (MEVACOR) 40 MG tablet    omeprazole (PriLOSEC) 20 mg delayed release capsule    OZEMPIC, 1 MG/DOSE, 2 MG/1 5ML SOPN    Allergies   Allergen Reactions    No Known Allergies            Objective     not currently breastfeeding  There is no height or weight on file to calculate BMI  PHYSICAL EXAM:      General Appearance:   Alert, cooperative, no distress   HEENT:   Normocephalic, atraumatic, anicteric      Neck:  Supple, symmetrical, trachea midline   Lungs:   Clear to auscultation bilaterally; no rales, rhonchi or wheezing; respirations unlabored    Heart[de-identified]   Regular rate and rhythm; no murmur, rub, or gallop  Abdomen:   Soft, non-tender, non-distended; normal bowel sounds; no masses, no organomegaly    Genitalia:   Deferred    Rectal:   Deferred    Extremities:  No cyanosis, clubbing or edema    Pulses:  2+ and symmetric    Skin:  No jaundice, rashes, or lesions    Lymph nodes:  No palpable cervical lymphadenopathy        Lab Results:   No visits with results within 1 Day(s) from this visit     Latest known visit with results is:   Admission on 11/26/2019, Discharged on 11/28/2019   Component Date Value    POC Glucose 11/26/2019 113     POC Glucose 11/26/2019 113     POC Glucose 11/26/2019 195*    POC Glucose 11/26/2019 170*    WBC 11/27/2019 10 93*    RBC 11/27/2019 4 19     Hemoglobin 11/27/2019 11 6     Hematocrit 11/27/2019 36 3     MCV 11/27/2019 87     MCH 11/27/2019 27 7     MCHC 11/27/2019 32 0     RDW 11/27/2019 15 3*    Platelets 07/53/2443 277     MPV 11/27/2019 10 8     Sodium 11/27/2019 140     Potassium 11/27/2019 4 4     Chloride 11/27/2019 110*    CO2 11/27/2019 23     ANION GAP 11/27/2019 7     BUN 11/27/2019 15  Creatinine 11/27/2019 0 81     Glucose 11/27/2019 110     Calcium 11/27/2019 8 8     eGFR 11/27/2019 73     POC Glucose 11/27/2019 136     POC Glucose 11/27/2019 148*    POC Glucose 11/27/2019 165*    POC Glucose 11/27/2019 175*    WBC 11/28/2019 12 51*    RBC 11/28/2019 4 09     Hemoglobin 11/28/2019 11 1*    Hematocrit 11/28/2019 35 1     MCV 11/28/2019 86     MCH 11/28/2019 27 1     MCHC 11/28/2019 31 6     RDW 11/28/2019 15 3*    Platelets 61/11/8587 260     MPV 11/28/2019 11 0     Sodium 11/28/2019 135*    Potassium 11/28/2019 4 2     Chloride 11/28/2019 105     CO2 11/28/2019 23     ANION GAP 11/28/2019 7     BUN 11/28/2019 13     Creatinine 11/28/2019 0 78     Glucose 11/28/2019 130     Calcium 11/28/2019 9 5     eGFR 11/28/2019 76     POC Glucose 11/28/2019 107     POC Glucose 11/28/2019 150*         Radiology Results:   No results found  Attestation:   By signing my name below, I, Candi Pineda, attest that this documentation has    been prepared under the direction and in the presence of TERE Posey  Electronically Signed: Ruth Lyman  1/13/2020        I, Jesse Richey, personally performed the services described in this    documentation  All medical record entries made by the scribe were at my    direction and in my presence  I have reviewed the chart and discharge    instructions and agree that the record reflects my personal performance and is    accurate and complete   TERE Posey  1/13/2020

## 2020-01-13 NOTE — PATIENT INSTRUCTIONS
EGD/colonoscopy scheduled 1/28/20 with Dr Karlo Altamirano at [facility]  Will B gave pt Suprep and diabetic instructions during OV

## 2020-01-14 ENCOUNTER — ANESTHESIA EVENT (OUTPATIENT)
Dept: ANESTHESIOLOGY | Facility: HOSPITAL | Age: 75
End: 2020-01-14

## 2020-01-14 ENCOUNTER — ANESTHESIA EVENT (OUTPATIENT)
Dept: GASTROENTEROLOGY | Facility: AMBULARY SURGERY CENTER | Age: 75
End: 2020-01-14

## 2020-01-14 ENCOUNTER — OFFICE VISIT (OUTPATIENT)
Dept: PHYSICAL THERAPY | Facility: REHABILITATION | Age: 75
End: 2020-01-14
Payer: COMMERCIAL

## 2020-01-14 ENCOUNTER — ANESTHESIA (OUTPATIENT)
Dept: ANESTHESIOLOGY | Facility: HOSPITAL | Age: 75
End: 2020-01-14

## 2020-01-14 DIAGNOSIS — Z96.651 STATUS POST TOTAL KNEE REPLACEMENT, RIGHT: Primary | ICD-10-CM

## 2020-01-14 PROCEDURE — 97110 THERAPEUTIC EXERCISES: CPT | Performed by: PHYSICAL THERAPIST

## 2020-01-14 PROCEDURE — 97140 MANUAL THERAPY 1/> REGIONS: CPT | Performed by: PHYSICAL THERAPIST

## 2020-01-14 PROCEDURE — 97112 NEUROMUSCULAR REEDUCATION: CPT | Performed by: PHYSICAL THERAPIST

## 2020-01-14 NOTE — PROGRESS NOTES
Daily Note     Today's date: 2020  Patient name: Rocky Suresh  : 1945  MRN: [id number]  Referring provider: Angela Harvey DO  Dx:   Encounter Diagnosis     ICD-10-CM    1  Status post total knee replacement, right Z96 651                 Subjective: Pt reports she hasn't been wearing the compression stocking consistently and notes increased knee pain for unknown reason today  Objective: See treatment diary below  Precautions: DM, HTN, GERD  Daily Treatment Diary   Assessment                   Eval/Reval  DK                     FOTO  44       **         **   HEP Issued   Yes                     Manuals                    Patellar Mobs    HY  MD                 Knee PROM  HY MD          Exercise Diary                     Bike    5' L1x5'                  Gastroc Stretch  - strap  NV  30"x3  30"x3                  Hamstring Stretch - longsit  30"x3  30"x3  30"x3                  Heel slides  10  10"x10  10"x10                  Quad Set  5"x10 5"x10  5"x15                  SAQ    5"x20  5"x20                  Bridge    5"x10  5"x10                  LAQ    3x10  5"x20                  Standing Knee Flexion    20  5"x15                  Mini Squats      10                  Step Ups:  Forward      NV                  Step Ups: Lateral      NV                  Semi Tandem Balance                        FTEC - Airex                       Modalities                    CP with knee extension stretch  Post-Tx      10'                 * = HEP    Assessment: Pt with good tolerance to progression of program with addition of mini squats and progression of repetitions with strengthening exercises  Pt with good improvement in right knee range of motion and patellar mobility  She continues with moderate right lower extremity edema, and was advised to utilize compression stocking (tubigrip) consistently to address this   Pt will benefit from continued skilled PT intervention in order to address  remaining limitations and to restore maximal function  Plan: Continue per plan of care

## 2020-01-16 ENCOUNTER — OFFICE VISIT (OUTPATIENT)
Dept: OBGYN CLINIC | Facility: MEDICAL CENTER | Age: 75
End: 2020-01-16

## 2020-01-16 ENCOUNTER — OFFICE VISIT (OUTPATIENT)
Dept: PHYSICAL THERAPY | Facility: REHABILITATION | Age: 75
End: 2020-01-16
Payer: COMMERCIAL

## 2020-01-16 VITALS
SYSTOLIC BLOOD PRESSURE: 100 MMHG | WEIGHT: 190 LBS | HEIGHT: 59 IN | HEART RATE: 69 BPM | BODY MASS INDEX: 38.3 KG/M2 | DIASTOLIC BLOOD PRESSURE: 59 MMHG

## 2020-01-16 DIAGNOSIS — Z96.651 STATUS POST TOTAL KNEE REPLACEMENT, RIGHT: Primary | ICD-10-CM

## 2020-01-16 DIAGNOSIS — M17.11 PRIMARY OSTEOARTHRITIS OF RIGHT KNEE: Primary | ICD-10-CM

## 2020-01-16 PROCEDURE — 97112 NEUROMUSCULAR REEDUCATION: CPT | Performed by: PHYSICAL THERAPIST

## 2020-01-16 PROCEDURE — 97110 THERAPEUTIC EXERCISES: CPT | Performed by: PHYSICAL THERAPIST

## 2020-01-16 PROCEDURE — 99024 POSTOP FOLLOW-UP VISIT: CPT | Performed by: ORTHOPAEDIC SURGERY

## 2020-01-16 NOTE — PROGRESS NOTES
Daily Note     Today's date: 2020  Patient name: Torsten Medrano  : 1945  MRN: [id number]  Referring provider: Laurie Jim DO  Dx:   Encounter Diagnosis     ICD-10-CM    1  Status post total knee replacement, right Z96 651                 Subjective: Pt reports she wore her compression stocking and that seems to help with her swelling  She c/o medial knee pain when walking - pt advised to reduce toe out gait and utilize [facility] in left hand to help alleviate this  Objective: See treatment diary below  Precautions: DM, HTN, GERD  Daily Treatment Diary   Assessment                 Eval/Reval  DK                     FOTO  44       **         **   HEP Issued   Yes                     Manuals                  Patellar Mobs    GULSHAN BROWNE                 Knee PROM  HY MD          Exercise Diary                   Bike    5' L1x5'  L1x5'                Gastroc Stretch  - strap  NV  30"x3  30"x3 30"x3                Hamstring Stretch - longsit  30"x3  30"x3  30"x3  30"x3                Heel slides  10  10"x10  10"x10                  Quad Set  5"x10 5"x10  5"x15  5"x20                SAQ    5"x20  5"x20  5"x20                Bridge    5"x10  5"x10  5"x15                LAQ    3x10  5"x20  5"x20                Standing Knee Flexion    20  5"x15  5"x20                Mini Squats      10 15                Step Ups:  Forward      NV                  Step Ups: Lateral      NV                  Semi Tandem Balance                        FTEC - Airex                       Modalities                  CP with knee extension stretch  Post-Tx      10'  10'  Supine  Block RLE ER               * = HEP    Assessment: Pt with good tolerance to progression of program with addition of mini squats and progression of repetitions with strengthening exercises  Pt with good improvement in right knee range of motion and patellar mobility   She continues to require moderate verbal and manual cues to ensure proper performance of all exercises  Pt will benefit from continued skilled PT intervention in order to address  remaining limitations and to restore maximal function  Plan: Continue per plan of care

## 2020-01-16 NOTE — PROGRESS NOTES
Assessment/Plan:  1  Primary osteoarthritis of right knee      No orders of the defined types were placed in this encounter  Lower leg cellulitis improved  Finish remaining days on Keflex prescription  Continue outpatient physical therapy  Pain control prn-OTC pain medication as needed  Patient should call ahead for abx prior to dental appts  Return in about 1 week (around 1/23/2020) for Recheck  I answered all of the patient's questions during the visit and provided education of the patient's condition during the visit  The patient verbalized understanding of the information given and agrees with the plan  This note was dictated using [employer] software  It may contain errors including improperly dictated words  Please contact physician directly for any questions  Subjective   Chief Complaint:   Chief Complaint   Patient presents with    Right Knee - Post-op, Follow-up       Kirk Rosales is a 69 y o  female who presents for 7 week follow up s/p right TKA  Patient states she feels that she is improving  She has mild occasional pain on the medial aspect of the right knee especially after physical therapy  Patient is not taking anything for pain currently  She still notes swelling and erythema in bilateral lower extremities which is the worst at the end of the day  She wears compression stockings bilaterally  She feels her incision is healing well  She has several days left on her Keflex prescription  Review of Systems  ROS:    See HPI for musculoskeletal review     All other systems reviewed are negative     History:  Past Medical History:   Diagnosis Date    Arthritis     Diabetes [facility]     Endometrial ca ([facility] ) 2000    GERD (gastroesophageal reflux disease)     Hard of hearing     Hyperlipidemia     Hypertension     Obesity     Stress incontinence     Swelling of both lower extremities     Thyroid disease     hypo    Use of cane as ambulatory aid     Wears glasses      Past Surgical History:   Procedure Laterality Date    CATARACT EXTRACTION Bilateral      SECTION      COLONOSCOPY  02/15/2016    small polyp, repeat 5yrs    EGD      HYSTERECTOMY      age 48    JOINT REPLACEMENT Right     knee    OOPHORECTOMY      both removed age 48    [place] TOTAL KNEE ARTHROPLASTY Right 2019    Procedure: ARTHROPLASTY KNEE TOTAL;  Surgeon: Griselda Dean DO;  Location: [facility];  Service: Orthopedics    UPPER GASTROINTESTINAL ENDOSCOPY       Social History   Social History     Substance and Sexual Activity   Alcohol Use Never    Frequency: Never     Social History     Substance and Sexual Activity   Drug Use No     Social History     Tobacco Use   Smoking Status Former Smoker    Last attempt to quit: 10/28/1971    Years since quittin 2   Smokeless Tobacco Never Used     Family History:   Family History   Problem Relation Age of Onset    Diabetes Father     Hypertension Father     Breast cancer Mother [de-identified]    Breast cancer Sister 46    Endometrial cancer Daughter 52       Current Outpatient Medications on File Prior to Visit   Medication Sig Dispense Refill    acetaminophen (TYLENOL) 650 mg CR tablet Take 1 tablet (650 mg total) by mouth every 8 (eight) hours as needed for mild pain 90 tablet 0    ascorbic acid (VITAMIN C) 500 mg tablet TAKE 1 TABLET (500 MG TOTAL) BY MOUTH DAILY 30 tablet 1    aspirin (ECOTRIN) 325 mg EC tablet TAKE 1 TABLET (325 MG TOTAL) BY MOUTH 2 (TWO) TIMES A DAY START WHEN FINISHED WITH LOVENOX 28 tablet 0    aspirin (ECOTRIN) 325 mg EC tablet TAKE ONE TABLET (325 MG TOTAL) BY MOUTH 2 (TWO) TIMES A DAY 28 tablet 0    ASPIRIN 81 PO Take 81 mg by mouth      cephalexin (KEFLEX) 500 mg capsule Take 1 capsule (500 mg total) by mouth every 8 (eight) hours for 10 days 30 capsule 0    docusate sodium (COLACE) 100 mg capsule Take 1 capsule (100 mg total) by mouth 2 (two) times a day 20 capsule 0    enalapril (VASOTEC) 2 5 mg tablet Take 2 5 mg by mouth daily  1    enoxaparin (LOVENOX) 40 mg/0 4 mL Inject 0 4 mL (40 mg total) under the skin daily 28 Syringe 0    Ergocalciferol (VITAMIN D2 PO) Take 50,000 Units by mouth once a week       ferrous sulfate 325 (65 Fe) mg tablet TAKE 1 TABLET (324 MG TOTAL) BY MOUTH DAILY 30 tablet 1    folic acid (FOLVITE) 1 mg tablet TAKE 1 TABLET (1 MG TOTAL) BY MOUTH DAILY 30 tablet 1    gabapentin (NEURONTIN) 100 mg capsule Take 1 capsule (100 mg total) by mouth 2 (two) times a day 60 capsule 0    glimepiride (AMARYL) 2 mg tablet Take 1 tablet (2 mg total) by mouth daily with dinner 30 tablet 5    JARDIANCE 25 MG TABS Take 25 mg by mouth daily  2    levothyroxine 50 mcg tablet TAKE ONE TABLET IN THE MORNING (MONDAY TO FRIDAY)  1    levothyroxine 75 mcg tablet Take 75 mcg by mouth daily      lovastatin (MEVACOR) 40 MG tablet Take 40 mg by mouth daily at bedtime  3    Na Sulfate-K Sulfate-Mg Sulf (SUPREP BOWEL PREP KIT) 17 5-3 13-1 6 GM/177ML SOLN Take 177 mL by mouth once for 1 dose 2 Bottle 0    omeprazole (PriLOSEC) 20 mg delayed release capsule Take 20 mg by mouth daily      OZEMPIC, 1 MG/DOSE, 2 MG/1 5ML SOPN 1 5 mg once a week   2    polyethylene glycol (GLYCOLAX) powder Lot # [id number]  Exp 1/21 5 g 0     No current facility-administered medications on file prior to visit        Allergies   Allergen Reactions    No Known Allergies         Objective     /59   Pulse 69   Ht 4' 11" (1 499 m)   Wt 86 2 kg (190 lb)   BMI 38 38 kg/m²      PE:  AAOx 3  WDWN  Hearing intact, no drainage from eyes  no audible wheezing  no abdominal distension  LE compartments soft, AT/GS intact    Ortho Exam:  right Knee:   INC: healed, mild erythema, mild swelling  AROM:0 - 110 degrees    Swelling of bilateral lower extremities with diffuse erythema, improved from 1 week ago

## 2020-01-17 ENCOUNTER — TRANSCRIBE ORDERS (OUTPATIENT)
Dept: GASTROENTEROLOGY | Facility: CLINIC | Age: 75
End: 2020-01-17

## 2020-01-22 ENCOUNTER — OFFICE VISIT (OUTPATIENT)
Dept: FAMILY MEDICINE CLINIC | Facility: CLINIC | Age: 75
End: 2020-01-22
Payer: COMMERCIAL

## 2020-01-22 VITALS
HEART RATE: 70 BPM | WEIGHT: 190 LBS | SYSTOLIC BLOOD PRESSURE: 122 MMHG | OXYGEN SATURATION: 98 % | RESPIRATION RATE: 18 BRPM | TEMPERATURE: 98 F | DIASTOLIC BLOOD PRESSURE: 80 MMHG | BODY MASS INDEX: 38.3 KG/M2 | HEIGHT: 59 IN

## 2020-01-22 DIAGNOSIS — Z96.651 STATUS POST RIGHT KNEE REPLACEMENT: ICD-10-CM

## 2020-01-22 DIAGNOSIS — K21.9 GASTROESOPHAGEAL REFLUX DISEASE WITHOUT ESOPHAGITIS: ICD-10-CM

## 2020-01-22 DIAGNOSIS — E11.65 UNCONTROLLED TYPE 2 DIABETES MELLITUS WITH HYPERGLYCEMIA (HCC): ICD-10-CM

## 2020-01-22 DIAGNOSIS — Z23 NEED FOR VACCINATION AGAINST STREPTOCOCCUS PNEUMONIAE USING PNEUMOCOCCAL CONJUGATE VACCINE 13: ICD-10-CM

## 2020-01-22 DIAGNOSIS — Z00.00 MEDICARE ANNUAL WELLNESS VISIT, SUBSEQUENT: Primary | ICD-10-CM

## 2020-01-22 DIAGNOSIS — K59.04 CHRONIC IDIOPATHIC CONSTIPATION: ICD-10-CM

## 2020-01-22 DIAGNOSIS — B37.3 VAGINAL CANDIDIASIS: ICD-10-CM

## 2020-01-22 PROCEDURE — 90670 PCV13 VACCINE IM: CPT | Performed by: NURSE PRACTITIONER

## 2020-01-22 PROCEDURE — G0009 ADMIN PNEUMOCOCCAL VACCINE: HCPCS | Performed by: NURSE PRACTITIONER

## 2020-01-22 PROCEDURE — 1160F RVW MEDS BY RX/DR IN RCRD: CPT | Performed by: NURSE PRACTITIONER

## 2020-01-22 PROCEDURE — 1170F FXNL STATUS ASSESSED: CPT | Performed by: NURSE PRACTITIONER

## 2020-01-22 PROCEDURE — G0439 PPPS, SUBSEQ VISIT: HCPCS | Performed by: NURSE PRACTITIONER

## 2020-01-22 PROCEDURE — 4040F PNEUMOC VAC/ADMIN/RCVD: CPT | Performed by: NURSE PRACTITIONER

## 2020-01-22 PROCEDURE — 1125F AMNT PAIN NOTED PAIN PRSNT: CPT | Performed by: NURSE PRACTITIONER

## 2020-01-22 RX ORDER — ASPIRIN 325 MG
TABLET, DELAYED RELEASE (ENTERIC COATED) ORAL
Qty: 28 TABLET | Refills: 0 | Status: SHIPPED | OUTPATIENT
Start: 2020-01-22 | End: 2021-08-04 | Stop reason: ALTCHOICE

## 2020-01-22 RX ORDER — OMEPRAZOLE 20 MG/1
20 CAPSULE, DELAYED RELEASE ORAL DAILY
Qty: 30 CAPSULE | Refills: 1 | Status: SHIPPED | OUTPATIENT
Start: 2020-01-22 | End: 2020-07-23 | Stop reason: SDUPTHER

## 2020-01-22 RX ORDER — FLUCONAZOLE 150 MG/1
150 TABLET ORAL ONCE
Qty: 2 TABLET | Refills: 0 | Status: SHIPPED | OUTPATIENT
Start: 2020-01-22 | End: 2020-01-22

## 2020-01-22 NOTE — PROGRESS NOTES
WOUND CHECK    2018    Lucia Stevens, : 1957    NURSE [facility]    B/P:  (Sitting)  (Standing)     Pulse:     Patient has fever: [] Temperature if indicated: No fevers noted per patient.    Wound Location: left infraclavicular    Dressing Removed [x]        Old Dressing Appearance:  Clean, dry [x]                 Old, bloody drainage [x]                            Moist, serous drainage []                Moist, thick yellow/green drainage []       Wound Appearance: Redness []  none               Drainage []   none               Culture obtained []   no     Color:      Consistency:      Amount:          Gloves used, wound cleansed with sterile 4x4 and peroxide [x]       MD notified [] MD orders:     Antibiotic started []  If checked, type  Other: Wound is clean, dry and intact. No s/s of infection. She is going to return to work in 3 weeks. 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    Appointment for follow-up scheduled for 3 months post procedure [x]    Future Appointments  Date Time Provider Department Center   2018 11:15 AM Sarbjit Ellison DO [facility] None   2018 11:00 AM NURSE [facility] CARD [facility] None           Rosmery Daniels, RN, 18      MD Signature:______________________________ Completed By/Date:            Assessment and Plan:     Problem List Items Addressed This Visit        Digestive    Gastroesophageal reflux disease without esophagitis    Relevant Medications    omeprazole (PriLOSEC) 20 mg delayed release capsule    Chronic idiopathic constipation       Endocrine    Uncontrolled type 2 diabetes mellitus with hyperglycemia ([facility] [facility] )       Other    Medicare annual wellness visit, subsequent - Primary     Today we discussed relevant labs indicated to check cardiovascular status and endocrine and explained importance of blood work  I also reviewed with patient education and counseling about preventive services, including these: Certain screenings, flu and pneumococcal shots, and referrals for other care as needed  Pneumonia vaccine given today  Discussed with patient 5 wishes and blue folder instructions to go in her freezer  Pt made aware to have this Medicare physical done once a year  Other Visit Diagnoses     Need for vaccination against Streptococcus pneumoniae using pneumococcal conjugate vaccine 13        Relevant Orders    PNEUMOCOCCAL CONJUGATE VACCINE 13-VALENT GREATER THAN 6 MONTHS (Completed)    Vaginal candidiasis        Relevant Medications    fluconazole (DIFLUCAN) 150 mg tablet        BMI Counseling: Body mass index is 38 38 kg/m²  The BMI is above normal  Nutrition recommendations include encouraging healthy choices of fruits and vegetables, consuming healthier snacks, limiting drinks that contain sugar, moderation in carbohydrate intake, reducing intake of saturated and trans fat and reducing intake of cholesterol  Exercise recommendations include moderate physical activity 150 minutes/week  Depression Screening and Follow-up Plan: Patient's depression screening was positive with a PHQ-2 score of 0  Clincally patient does not have depression  No treatment is required  Falls Plan of Care: balance, strength, and gait training instructions were provided  Assessed feet and footwear  Cognitive screening performed  Preventive health issues were discussed with patient, and age appropriate screening tests were ordered as noted in patient's After Visit Summary  Personalized health advice and appropriate referrals for health education or preventive services given if needed, as noted in patient's After Visit Summary       History of Present Illness:     Patient presents for Medicare Annual Wellness visit    Patient Care Team:  Ena Barron MD as PCP - General (Family Medicine)     Problem List:     Patient Active Problem List   Diagnosis    [facility]    Hypothyroidism    Bronchitis    Cough    Hypercalcemia    Primary osteoarthritis of right knee    Viral upper respiratory tract infection    Localized edema    Esophageal stricture    Venous (peripheral) insufficiency    Benign essential HTN    Hyperlipidemia    Morbid obesity [facility]    Acute blood loss anemia    Encounter for support and coordination of transition of care    Gastroesophageal reflux disease without esophagitis    Chronic idiopathic constipation    History of colon polyps    Uncontrolled type 2 diabetes mellitus with hyperglycemia ([facility]    Medicare annual wellness visit, subsequent      Past Medical and Surgical History:     Past Medical History:   Diagnosis Date    Arthritis     Diabetes [facility] [facility]     Endometrial ca [facility]     GERD (gastroesophageal reflux disease)     Hard of hearing     Hyperlipidemia     Hypertension     Obesity     Stress incontinence     Swelling of both lower extremities     Thyroid disease     hypo    Use of cane as ambulatory aid     Wears glasses      Past Surgical History:   Procedure Laterality Date    CATARACT EXTRACTION Bilateral      SECTION      COLONOSCOPY  02/15/2016    small polyp, repeat 5yrs    EGD      HYSTERECTOMY      age 48    JOINT REPLACEMENT Right     knee    OOPHORECTOMY      both removed age 48    [place] TOTAL KNEE ARTHROPLASTY Right 2019    Procedure: ARTHROPLASTY KNEE TOTAL;  Surgeon: Bre Reynolds DO;  Location: [facility];  Service: Orthopedics    UPPER GASTROINTESTINAL ENDOSCOPY        Family History:     Family History   Problem Relation Age of Onset    Diabetes Father     Hypertension Father    [place] Case Breast cancer Mother [de-identified]    Breast cancer Sister 46    Endometrial cancer Daughter 52      Social History:     Social History     Socioeconomic History    Marital status:       Spouse name: None    Number of children: None    Years of education: None    Highest education level: None   Occupational History    None   Social Needs    Financial resource strain: None    Food insecurity:     Worry: None     Inability: None    Transportation needs:     Medical: None     Non-medical: None   Tobacco Use    Smoking status: Former Smoker     Last attempt to quit: 10/28/1971     Years since quittin 2    Smokeless tobacco: Never Used   Substance and Sexual Activity    Alcohol use: Never     Frequency: Never    Drug use: No    Sexual activity: Not Currently     Partners: Male   Lifestyle    Physical activity:     Days per week: None     Minutes per session: None    Stress: None   Relationships    Social connections:     Talks on phone: None     Gets together: None     Attends Hoahaoism service: None     Active member of club or organization: None     Attends meetings of clubs or organizations: None     Relationship status: None    Intimate partner violence:     Fear of current or ex partner: None     Emotionally abused: None     Physically abused: None     Forced sexual activity: None   Other Topics Concern    None   Social History Narrative    None       Medications and Allergies:     Current Outpatient Medications   Medication Sig Dispense Refill    acetaminophen (TYLENOL) 650 mg CR tablet Take 1 tablet (650 mg total) by mouth every 8 (eight) hours as needed for mild pain 90 tablet 0  ascorbic acid (VITAMIN C) 500 mg tablet TAKE 1 TABLET (500 MG TOTAL) BY MOUTH DAILY 30 tablet 1    aspirin (ECOTRIN) 325 mg EC tablet TAKE 1 TABLET (325 MG TOTAL) BY MOUTH 2 (TWO) TIMES A DAY START WHEN FINISHED WITH LOVENOX 28 tablet 0    aspirin (ECOTRIN) 325 mg EC tablet TAKE ONE TABLET (325 MG TOTAL) BY MOUTH 2 (TWO) TIMES A DAY 28 tablet 0    ASPIRIN 81 PO Take 81 mg by mouth      docusate sodium (COLACE) 100 mg capsule Take 1 capsule (100 mg total) by mouth 2 (two) times a day 20 capsule 0    enalapril (VASOTEC) 2 5 mg tablet Take 2 5 mg by mouth daily  1    Ergocalciferol (VITAMIN D2 PO) Take 50,000 Units by mouth once a week       ferrous sulfate 325 (65 Fe) mg tablet TAKE 1 TABLET (324 MG TOTAL) BY MOUTH DAILY 30 tablet 1    folic acid (FOLVITE) 1 mg tablet TAKE 1 TABLET (1 MG TOTAL) BY MOUTH DAILY 30 tablet 1    gabapentin (NEURONTIN) 100 mg capsule Take 1 capsule (100 mg total) by mouth 2 (two) times a day 60 capsule 0    glimepiride (AMARYL) 2 mg tablet Take 1 tablet (2 mg total) by mouth daily with dinner 30 tablet 5    JARDIANCE 25 MG TABS Take 25 mg by mouth daily  2    levothyroxine 50 mcg tablet TAKE ONE TABLET IN THE MORNING (MONDAY TO FRIDAY)  1    levothyroxine 75 mcg tablet Take 75 mcg by mouth daily      lovastatin (MEVACOR) 40 MG tablet Take 40 mg by mouth daily at bedtime  3    omeprazole (PriLOSEC) 20 mg delayed release capsule Take 1 capsule (20 mg total) by mouth daily 30 capsule 1    OZEMPIC, 1 MG/DOSE, 2 MG/1 5ML SOPN 1 5 mg once a week   2    polyethylene glycol (GLYCOLAX) powder Lot # [id number]  Exp 1/21 5 g 0    enoxaparin (LOVENOX) 40 mg/0 4 mL Inject 0 4 mL (40 mg total) under the skin daily 28 Syringe 0    fluconazole (DIFLUCAN) 150 mg tablet Take 1 tablet (150 mg total) by mouth once for 1 dose 2 tablet 0    Na Sulfate-K Sulfate-Mg Sulf (SUPREP BOWEL PREP KIT) 17 5-3 13-1 6 GM/177ML SOLN Take 177 mL by mouth once for 1 dose 2 Bottle 0     No current facility-administered medications for this visit  Allergies   Allergen Reactions    No Known Allergies       Immunizations:     Immunization History   Administered Date(s) Administered    INFLUENZA 01/10/2013, 10/30/2015, 11/01/2016, 10/02/2017, 11/26/2018    Influenza Split 11/19/2013, 12/18/2014    Influenza Split High Dose Preservative Free IM 10/02/2017    Influenza, high dose seasonal 0 5 mL 11/26/2018, 11/11/2019    Pneumococcal Conjugate 13-Valent 01/22/2020    Pneumococcal Polysaccharide PPV23 12/01/2014      Health Maintenance:         Topic Date Due    MAMMOGRAM  04/25/2020    CRC Screening: Colonoscopy  02/15/2021    DXA SCAN  11/19/2021    Hepatitis C Screening  Completed         Topic Date Due    Hepatitis B Vaccine (1 of 3 - Risk 3-dose series) [birth year]/1966      Medicare Health Risk Assessment:     /80 (BP Location: Right arm, Patient Position: Sitting, Cuff Size: Standard)   Pulse 70   Temp 98 °F (36 7 °C) (Oral)   Resp 18   Ht 4' 11" (1 499 m)   Wt 86 2 kg (190 lb)   SpO2 98%   BMI 38 38 kg/m²          Health Risk Assessment:   Patient rates overall health as very good  Patient feels that their physical health rating is much better  Eyesight was rated as same  Hearing was rated as slightly worse  Patient feels that their emotional and mental health rating is much better  Pain experienced in the last 7 days has been some  Patient's pain rating has been 3/10  Patient states that she has experienced no weight loss or gain in last 6 months  Depression Screening:   PHQ-2 Score: 0      Fall Risk Screening: In the past year, patient has experienced: no history of falling in past year      Urinary Incontinence Screening:   Patient has not leaked urine accidently in the last six months  Home Safety:  Patient does not have trouble with stairs inside or outside of their home  Patient has working smoke alarms and has working carbon monoxide detector   Home safety hazards include: none  Nutrition:   Current diet is Regular  Medications:   Patient is not currently taking any over-the-counter supplements  Patient is able to manage medications  Activities of Daily Living (ADLs)/Instrumental Activities of Daily Living (IADLs):   Walk and transfer into and out of bed and chair?: Yes  Dress and groom yourself?: Yes    Bathe or shower yourself?: Yes    Feed yourself?  Yes  Do your laundry/housekeeping?: Yes  Manage your money, pay your bills and track your expenses?: Yes  Make your own meals?: Yes    Do your own shopping?: Yes    Previous Hospitalizations:   Any hospitalizations or ED visits within the last 12 months?: No      Advance Care Planning:   Living will: Yes    Advanced directive: Yes      PREVENTIVE SCREENINGS      Cardiovascular Screening:    General: Screening Not Indicated and History Lipid Disorder      Diabetes Screening:     General: Screening Not Indicated and History Diabetes      Colorectal Cancer Screening:     General: Screening Current    Due for: Colonoscopy - Low Risk      Breast Cancer Screening:     General: Screening Current    Due for: Mammogram        Cervical Cancer Screening:    General: Screening Not Indicated      Osteoporosis Screening:    General: Screening Current      Abdominal Aortic Aneurysm (AAA) Screening:        General: Screening Not Indicated      Lung Cancer Screening:     General: Screening Not Indicated      Hepatitis C Screening:    General: Screening Current      RAVI Senior

## 2020-01-22 NOTE — ASSESSMENT & PLAN NOTE
Today we discussed relevant labs indicated to check cardiovascular status and endocrine and explained importance of blood work  I also reviewed with patient education and counseling about preventive services, including these: Certain screenings, flu and pneumococcal shots, and referrals for other care as needed  Pneumonia vaccine given today  Discussed with patient 5 wishes and blue folder instructions to go in her freezer  Pt made aware to have this Medicare physical done once a year

## 2020-01-28 ENCOUNTER — HOSPITAL ENCOUNTER (OUTPATIENT)
Dept: GASTROENTEROLOGY | Facility: AMBULARY SURGERY CENTER | Age: 75
Setting detail: OUTPATIENT SURGERY
Discharge: HOME | End: 2020-01-28
Attending: INTERNAL MEDICINE | Admitting: INTERNAL MEDICINE
Payer: COMMERCIAL

## 2020-01-28 ENCOUNTER — PREP FOR PROCEDURE (OUTPATIENT)
Dept: GASTROENTEROLOGY | Facility: CLINIC | Age: 75
End: 2020-01-28

## 2020-01-28 ENCOUNTER — ANESTHESIA (OUTPATIENT)
Dept: GASTROENTEROLOGY | Facility: AMBULARY SURGERY CENTER | Age: 75
End: 2020-01-28

## 2020-01-28 VITALS
HEART RATE: 69 BPM | TEMPERATURE: 98.1 F | HEIGHT: 59 IN | WEIGHT: 186 LBS | OXYGEN SATURATION: 100 % | RESPIRATION RATE: 18 BRPM | SYSTOLIC BLOOD PRESSURE: 152 MMHG | DIASTOLIC BLOOD PRESSURE: 67 MMHG | BODY MASS INDEX: 37.5 KG/M2

## 2020-01-28 DIAGNOSIS — R13.19 ESOPHAGEAL DYSPHAGIA: Primary | ICD-10-CM

## 2020-01-28 DIAGNOSIS — K21.9 GASTROESOPHAGEAL REFLUX DISEASE WITHOUT ESOPHAGITIS: ICD-10-CM

## 2020-01-28 DIAGNOSIS — K59.04 CHRONIC IDIOPATHIC CONSTIPATION: ICD-10-CM

## 2020-01-28 DIAGNOSIS — K22.2 ESOPHAGEAL STRICTURE: ICD-10-CM

## 2020-01-28 PROCEDURE — 88305 TISSUE EXAM BY PATHOLOGIST: CPT | Performed by: PATHOLOGY

## 2020-01-28 PROCEDURE — NC001 PR NO CHARGE: Performed by: INTERNAL MEDICINE

## 2020-01-28 PROCEDURE — 45378 DIAGNOSTIC COLONOSCOPY: CPT | Performed by: INTERNAL MEDICINE

## 2020-01-28 PROCEDURE — 43239 EGD BIOPSY SINGLE/MULTIPLE: CPT | Performed by: INTERNAL MEDICINE

## 2020-01-28 RX ORDER — LIDOCAINE HYDROCHLORIDE 20 MG/ML
INJECTION, SOLUTION EPIDURAL; INFILTRATION; INTRACAUDAL; PERINEURAL AS NEEDED
Status: DISCONTINUED | OUTPATIENT
Start: 2020-01-28 | End: 2020-01-28 | Stop reason: SURG

## 2020-01-28 RX ORDER — SODIUM CHLORIDE, SODIUM LACTATE, POTASSIUM CHLORIDE, CALCIUM CHLORIDE 600; 310; 30; 20 MG/100ML; MG/100ML; MG/100ML; MG/100ML
100 INJECTION, SOLUTION INTRAVENOUS CONTINUOUS
Status: DISCONTINUED | OUTPATIENT
Start: 2020-01-28 | End: 2020-02-01 | Stop reason: HOSPADM

## 2020-01-28 RX ORDER — PROPOFOL 10 MG/ML
INJECTION, EMULSION INTRAVENOUS AS NEEDED
Status: DISCONTINUED | OUTPATIENT
Start: 2020-01-28 | End: 2020-01-28 | Stop reason: SURG

## 2020-01-28 RX ADMIN — PROPOFOL 20 MG: 10 INJECTION, EMULSION INTRAVENOUS at 08:25

## 2020-01-28 RX ADMIN — PROPOFOL 30 MG: 10 INJECTION, EMULSION INTRAVENOUS at 08:19

## 2020-01-28 RX ADMIN — PROPOFOL 30 MG: 10 INJECTION, EMULSION INTRAVENOUS at 08:14

## 2020-01-28 RX ADMIN — LIDOCAINE HYDROCHLORIDE 100 MG: 20 INJECTION, SOLUTION EPIDURAL; INFILTRATION; INTRACAUDAL; PERINEURAL at 08:04

## 2020-01-28 RX ADMIN — PROPOFOL 20 MG: 10 INJECTION, EMULSION INTRAVENOUS at 08:21

## 2020-01-28 RX ADMIN — PROPOFOL 20 MG: 10 INJECTION, EMULSION INTRAVENOUS at 08:31

## 2020-01-28 RX ADMIN — PROPOFOL 30 MG: 10 INJECTION, EMULSION INTRAVENOUS at 08:08

## 2020-01-28 RX ADMIN — PROPOFOL 120 MG: 10 INJECTION, EMULSION INTRAVENOUS at 08:04

## 2020-01-28 RX ADMIN — PROPOFOL 30 MG: 10 INJECTION, EMULSION INTRAVENOUS at 08:23

## 2020-01-28 RX ADMIN — SODIUM CHLORIDE, SODIUM LACTATE, POTASSIUM CHLORIDE, AND CALCIUM CHLORIDE 100 ML/HR: .6; .31; .03; .02 INJECTION, SOLUTION INTRAVENOUS at 07:43

## 2020-01-28 RX ADMIN — PROPOFOL 20 MG: 10 INJECTION, EMULSION INTRAVENOUS at 08:17

## 2020-01-28 RX ADMIN — PROPOFOL 20 MG: 10 INJECTION, EMULSION INTRAVENOUS at 08:28

## 2020-01-28 NOTE — ANESTHESIA PREPROCEDURE EVALUATION
Stricture, gerd, constipation eval    Review of Systems/Medical History  Patient summary reviewed  Chart reviewed  No history of anesthetic complications     Cardiovascular  EKG reviewed, Exercise tolerance (METS): >4,  Hyperlipidemia, Hypertension ,    Pulmonary  Smoker ex-smoker  ,        GI/Hepatic    GERD ,        Negative  ROS        Endo/Other  Diabetes type 2 Oral agent, History of thyroid disease , hypothyroidism,      GYN  Negative gynecology ROS          Hematology  Anemia ,     Musculoskeletal    Arthritis     Neurology  Negative neurology ROS      Psychology   Negative psychology ROS              Physical Exam    Airway    Mallampati score: II  TM Distance: >3 FB  Neck ROM: full     Dental   No notable dental hx     Cardiovascular      Pulmonary      Other Findings        Anesthesia Plan  ASA Score- 3     Anesthesia Type- IV sedation with anesthesia with ASA Monitors  Additional Monitors:   Airway Plan:     Comment: Per patient, appropriately NPO, denies active CP/SOB/wheezing/symptoms related to heartburn/nausea/vomiting  Plan Factors-  Patient did not smoke on day of surgery  Induction- intravenous  Postoperative Plan- Plan for postoperative opioid use  Informed Consent- Anesthetic plan and risks discussed with patient  I personally reviewed this patient with the CRNA  Discussed and agreed on the Anesthesia Plan with the CRNA  Dave Ernst

## 2020-01-28 NOTE — INTERVAL H&P NOTE
H&P reviewed  After examining the patient I find no changes in the patients condition since the H&P had been written      Vitals:    01/28/20 0738   BP: 126/58   Pulse: 84   Resp: 18   Temp: 97 7 °F (36 5 °C)   SpO2: 99%

## 2020-01-28 NOTE — ANESTHESIA POSTPROCEDURE EVALUATION
Post-Op Assessment Note    CV Status:  Stable  Pain Score: 0    Pain management: adequate     Mental Status:  Sleepy   Hydration Status:  Euvolemic   PONV Controlled:  Controlled   Airway Patency:  Patent and adequate   Post Op Vitals Reviewed: Yes      Staff: CRNA           BP   112/75   Temp      Pulse  78   Resp   14   SpO2   96%

## 2020-01-31 DIAGNOSIS — B37.3 VAGINAL CANDIDIASIS: ICD-10-CM

## 2020-01-31 RX ORDER — FLUCONAZOLE 150 MG/1
TABLET ORAL
Qty: 2 TABLET | Refills: 0 | OUTPATIENT
Start: 2020-01-31

## 2020-02-01 DIAGNOSIS — Z96.651 STATUS POST RIGHT KNEE REPLACEMENT: ICD-10-CM

## 2020-02-04 RX ORDER — ASPIRIN 325 MG
TABLET, DELAYED RELEASE (ENTERIC COATED) ORAL
Qty: 28 TABLET | Refills: 0 | OUTPATIENT
Start: 2020-02-04

## 2020-02-06 ENCOUNTER — TELEPHONE (OUTPATIENT)
Dept: GASTROENTEROLOGY | Facility: CLINIC | Age: 75
End: 2020-02-06

## 2020-02-06 NOTE — TELEPHONE ENCOUNTER
----- Message from Darlene Paiz sent at 1/31/2020  9:48 AM EST -----  Regarding: FW: Please schedule this patient for esophageal manometry      ----- Message -----  From: Gely Duffy MD  Sent: 1/28/2020   9:08 AM EST  To: Gastroenterology Surgery Coordinator  Subject: Please schedule this patient for esophageal #    Please schedule this patient for esophageal manometry        Thank you,    Vasquez Higginbotham

## 2020-02-07 ENCOUNTER — HOSPITAL ENCOUNTER (OUTPATIENT)
Dept: GASTROENTEROLOGY | Facility: HOSPITAL | Age: 75
Discharge: HOME | End: 2020-02-07
Attending: INTERNAL MEDICINE
Payer: COMMERCIAL

## 2020-02-07 VITALS
SYSTOLIC BLOOD PRESSURE: 146 MMHG | RESPIRATION RATE: 16 BRPM | TEMPERATURE: 97.8 F | DIASTOLIC BLOOD PRESSURE: 64 MMHG | HEART RATE: 64 BPM | OXYGEN SATURATION: 98 %

## 2020-02-07 DIAGNOSIS — R13.19 ESOPHAGEAL DYSPHAGIA: ICD-10-CM

## 2020-02-07 PROCEDURE — 91020 GASTRIC MOTILITY STUDIES: CPT

## 2020-02-11 ENCOUNTER — TELEPHONE (OUTPATIENT)
Dept: GASTROENTEROLOGY | Facility: CLINIC | Age: 75
End: 2020-02-11

## 2020-02-11 ENCOUNTER — LAB (OUTPATIENT)
Dept: LAB | Facility: IMAGING CENTER | Age: 75
End: 2020-02-11
Payer: COMMERCIAL

## 2020-02-11 ENCOUNTER — TRANSCRIBE ORDERS (OUTPATIENT)
Dept: ADMINISTRATIVE | Facility: HOSPITAL | Age: 75
End: 2020-02-11

## 2020-02-11 DIAGNOSIS — E11.9 TYPE 2 DIABETES MELLITUS WITHOUT COMPLICATION, WITHOUT LONG-TERM CURRENT USE OF INSULIN (HCC): ICD-10-CM

## 2020-02-11 DIAGNOSIS — E06.3 CHRONIC LYMPHOCYTIC THYROIDITIS: ICD-10-CM

## 2020-02-11 DIAGNOSIS — E03.9 HYPOTHYROIDISM (ACQUIRED): ICD-10-CM

## 2020-02-11 DIAGNOSIS — E78.5 HYPERLIPIDEMIA, UNSPECIFIED HYPERLIPIDEMIA TYPE: ICD-10-CM

## 2020-02-11 DIAGNOSIS — E11.9 TYPE 2 DIABETES MELLITUS WITHOUT COMPLICATION, WITHOUT LONG-TERM CURRENT USE OF INSULIN (HCC): Primary | ICD-10-CM

## 2020-02-11 LAB
ALBUMIN SERPL BCP-MCNC: 3.5 G/DL (ref 3.5–5)
ALP SERPL-CCNC: 120 U/L (ref 46–116)
ALT SERPL W P-5'-P-CCNC: 16 U/L (ref 12–78)
ANION GAP SERPL CALCULATED.3IONS-SCNC: 4 MMOL/L (ref 4–13)
AST SERPL W P-5'-P-CCNC: 9 U/L (ref 5–45)
BASOPHILS # BLD AUTO: 0.06 THOUSANDS/ΜL (ref 0–0.1)
BASOPHILS NFR BLD AUTO: 1 % (ref 0–1)
BILIRUB SERPL-MCNC: 0.65 MG/DL (ref 0.2–1)
BUN SERPL-MCNC: 16 MG/DL (ref 5–25)
CALCIUM SERPL-MCNC: 9.9 MG/DL (ref 8.3–10.1)
CHLORIDE SERPL-SCNC: 109 MMOL/L (ref 100–108)
CHOLEST SERPL-MCNC: 135 MG/DL (ref 50–200)
CO2 SERPL-SCNC: 27 MMOL/L (ref 21–32)
CREAT SERPL-MCNC: 0.79 MG/DL (ref 0.6–1.3)
EOSINOPHIL # BLD AUTO: 0.18 THOUSAND/ΜL (ref 0–0.61)
EOSINOPHIL NFR BLD AUTO: 2 % (ref 0–6)
ERYTHROCYTE [DISTWIDTH] IN BLOOD BY AUTOMATED COUNT: 14.9 % (ref 11.6–15.1)
EST. AVERAGE GLUCOSE BLD GHB EST-MCNC: 160 MG/DL
GFR SERPL CREATININE-BSD FRML MDRD: 75 ML/MIN/1.73SQ M
GLUCOSE P FAST SERPL-MCNC: 112 MG/DL (ref 65–99)
HBA1C MFR BLD: 7.2 %
HCT VFR BLD AUTO: 46.2 % (ref 34.8–46.1)
HDLC SERPL-MCNC: 54 MG/DL
HGB BLD-MCNC: 14.3 G/DL (ref 11.5–15.4)
IMM GRANULOCYTES # BLD AUTO: 0.05 THOUSAND/UL (ref 0–0.2)
IMM GRANULOCYTES NFR BLD AUTO: 1 % (ref 0–2)
LDLC SERPL CALC-MCNC: 53 MG/DL (ref 0–100)
LYMPHOCYTES # BLD AUTO: 2.62 THOUSANDS/ΜL (ref 0.6–4.47)
LYMPHOCYTES NFR BLD AUTO: 29 % (ref 14–44)
MCH RBC QN AUTO: 27.7 PG (ref 26.8–34.3)
MCHC RBC AUTO-ENTMCNC: 31 G/DL (ref 31.4–37.4)
MCV RBC AUTO: 90 FL (ref 82–98)
MONOCYTES # BLD AUTO: 0.63 THOUSAND/ΜL (ref 0.17–1.22)
MONOCYTES NFR BLD AUTO: 7 % (ref 4–12)
NEUTROPHILS # BLD AUTO: 5.62 THOUSANDS/ΜL (ref 1.85–7.62)
NEUTS SEG NFR BLD AUTO: 60 % (ref 43–75)
NONHDLC SERPL-MCNC: 81 MG/DL
NRBC BLD AUTO-RTO: 0 /100 WBCS
PLATELET # BLD AUTO: 319 THOUSANDS/UL (ref 149–390)
PMV BLD AUTO: 10.9 FL (ref 8.9–12.7)
POTASSIUM SERPL-SCNC: 4 MMOL/L (ref 3.5–5.3)
PROT SERPL-MCNC: 6.9 G/DL (ref 6.4–8.2)
RBC # BLD AUTO: 5.16 MILLION/UL (ref 3.81–5.12)
SODIUM SERPL-SCNC: 140 MMOL/L (ref 136–145)
T4 FREE SERPL-MCNC: 1.08 NG/DL (ref 0.76–1.46)
TRIGL SERPL-MCNC: 142 MG/DL
TSH SERPL DL<=0.05 MIU/L-ACNC: 2.51 UIU/ML (ref 0.36–3.74)
WBC # BLD AUTO: 9.16 THOUSAND/UL (ref 4.31–10.16)

## 2020-02-11 PROCEDURE — 80061 LIPID PANEL: CPT

## 2020-02-11 PROCEDURE — 83036 HEMOGLOBIN GLYCOSYLATED A1C: CPT

## 2020-02-11 PROCEDURE — 84439 ASSAY OF FREE THYROXINE: CPT

## 2020-02-11 PROCEDURE — 84443 ASSAY THYROID STIM HORMONE: CPT

## 2020-02-11 PROCEDURE — 85025 COMPLETE CBC W/AUTO DIFF WBC: CPT

## 2020-02-11 PROCEDURE — 36415 COLL VENOUS BLD VENIPUNCTURE: CPT

## 2020-02-11 PROCEDURE — 80053 COMPREHEN METABOLIC PANEL: CPT

## 2020-02-11 NOTE — TELEPHONE ENCOUNTER
----- Message from Kay Lennox, MD sent at 2/11/2020  9:56 AM EST -----  Biopsy from esophagus was negative for eosinophilic esophagitis    Overall normal biopsy

## 2020-02-17 PROCEDURE — 91010 ESOPHAGUS MOTILITY STUDY: CPT | Performed by: INTERNAL MEDICINE

## 2020-03-17 ENCOUNTER — OFFICE VISIT (OUTPATIENT)
Dept: GASTROENTEROLOGY | Facility: CLINIC | Age: 75
End: 2020-03-17
Payer: COMMERCIAL

## 2020-03-17 VITALS
DIASTOLIC BLOOD PRESSURE: 59 MMHG | BODY MASS INDEX: 37.38 KG/M2 | TEMPERATURE: 97.4 F | SYSTOLIC BLOOD PRESSURE: 119 MMHG | HEART RATE: 63 BPM | WEIGHT: 185.4 LBS | HEIGHT: 59 IN

## 2020-03-17 DIAGNOSIS — K22.2 ESOPHAGEAL STRICTURE: Primary | ICD-10-CM

## 2020-03-17 DIAGNOSIS — K22.0 ACHALASIA: ICD-10-CM

## 2020-03-17 DIAGNOSIS — J40 BRONCHITIS: ICD-10-CM

## 2020-03-17 PROCEDURE — 1160F RVW MEDS BY RX/DR IN RCRD: CPT | Performed by: INTERNAL MEDICINE

## 2020-03-17 PROCEDURE — 3051F HG A1C>EQUAL 7.0%<8.0%: CPT | Performed by: INTERNAL MEDICINE

## 2020-03-17 PROCEDURE — 3008F BODY MASS INDEX DOCD: CPT | Performed by: INTERNAL MEDICINE

## 2020-03-17 PROCEDURE — 3078F DIAST BP <80 MM HG: CPT | Performed by: INTERNAL MEDICINE

## 2020-03-17 PROCEDURE — 99214 OFFICE O/P EST MOD 30 MIN: CPT | Performed by: INTERNAL MEDICINE

## 2020-03-17 PROCEDURE — 4040F PNEUMOC VAC/ADMIN/RCVD: CPT | Performed by: INTERNAL MEDICINE

## 2020-03-17 PROCEDURE — 1036F TOBACCO NON-USER: CPT | Performed by: INTERNAL MEDICINE

## 2020-03-17 PROCEDURE — 3074F SYST BP LT 130 MM HG: CPT | Performed by: INTERNAL MEDICINE

## 2020-03-17 RX ORDER — FLUCONAZOLE 150 MG/1
TABLET ORAL
COMMUNITY
Start: 2020-03-08 | End: 2021-12-09 | Stop reason: ALTCHOICE

## 2020-03-17 NOTE — PATIENT INSTRUCTIONS
Achalasia     Achalasia is a chronic condition that can increase the risk for developing esophageal cancer  It occurs when the valve between the esophagus and the stomach does not open properly and the esophageal pump does not push food down, allowing it to collect within the esophagus  This promotes enlarging and stretching of the organ  This rare illness occurs when the nerves that signal swallowing become damaged  It is unclear why achalasia develops  We also dont know why some people who have it go on to develop esophageal cancer  Symptoms of Achalasia   The main symptom of achalasia is difficulty swallowing liquids  People with achalasia may also experience refluxlike symptoms such as heartburn, regurgitation, and pain with swallowing  Diagnosis of Achalasia   To diagnose this condition, we use endoscopy, x-rays of the esophagus, and tests to measure pressure within the esophagus  Treatment for Achalasia   The most effective treatment involves cutting the sphincter muscle, either surgically or endoscopically, to allow food to pass through the esophagus  For patients who are older and frail, we might recommend endoscopic therapy, which involves expanding or stretching the sphincter or injecting botulinum toxin (Botox) into the sphincter  The drug paralyzes and relaxes the muscle, allowing you to swallow more normally  However, this benefit is often temporary  Surgery for Achalasia   Surgery is the most effective treatment for people with achalasia  Our surgeons use a procedure called a Heller myotomy, which involves cutting the abnormally thickened muscle surrounding the esophageal valve  This approach allows liquid and food to pass normally through the esophagus and dramatically improves quality of life for many people with achalasia  A separate operation called a fundoplication procedure, in which a surgeon tightens the valve between the esophagus and the stomach, is always performed at the same time  This is done to prevent gastroesophageal reflux disease (GERD), which is prevalent after cutting the esophageal valve  Both procedures are performed with minimally invasive techniques  Peroral Endoscopic Myotomy (POEM)   The thickened muscle around the esophageal valve can be cut without making any incisions in the skin  This is a new approach and theres not yet much information about its long-term benefits, especially in regards to the incidence of GERD after this procedure

## 2020-03-17 NOTE — PROGRESS NOTES
[facility] - Outpatient Follow-up Note  Pawel Craven 69 y o  female MRN: [id number]  Encounter: [id number]          ASSESSMENT AND PLAN:  Ms Mecca Victoria was seen today for dysphagia  Diagnoses and all orders for this visit:     Achalasia: Ms Mecca Victoria has continued dysphagia with both liquids and solid foods, with occasional choking and regurgitation for the past 3-4 years, manometry showed  type 2 achalasia  Esophageal manometry  Esophageal motility- all 10 swallows demonstrated abnormal esophageal contractibility pattern consistent with pan esophageal contraction/pressurization  Mean DCI is 870 mmHg  s cm  LES- median IRP is 20 mmHg which is elevated  Impedance-10% complete clearance of liquid bolus swallows     [place] classification- findings concerning for type 2 achalasia    Pj symptom score is 4  We discussed possible treatments including botox injection, which is not ideal due to need for repeat treatment, Heller myotomy and POEM  She is not a candidate for calcium channel blockers due to hypotension  We discussed surgical procedures in detail and possible referral for POEM  I provided her with detailed information regarding achalasia and treatment in the patient instructions  She and her son are leaning toward the [facility] myotomy  with fundoplication  I will refer her to Dr Doyle President for surgical consult  She will continue omeprazole 20 mg 30 minutes prior to breakfast  I will order BMP and CT chest with contrast for achalasia to rule out malignancy  Follow-up in 4 months        ______________________________________________________________________    SUBJECTIVE:  Pawel Craven is a 69 y o  female with GERD, DM, HTN, HLD and hypothyroidism on the office today for management of achalasia  I last saw Ms Mecca Victoria in the office 1/13/2020  She had continued dysphagia with both liquids and solid foods, with occasional choking and vomiting   Barium swallow 5/30/19 revealed a short segment area of significant narrowing at the GE junction causing significant delay of transit of barium from the esophagus to the stomach, indicating esophageal dysmotility  I performed EGD 1/28/2020  Mild eroded and erythematous mucosa in the esophagus, large amount of fluid was suctioned from esophagus  Moderately dilated distal and mid esophagus  GE junction appeared to be slightly tight while passing the scope otherwise no apparent stricture or mass in this area, concerning for achalasia  The stomach and duodenum appeared normal  Biopsies negative for eosinophilic esophagitis  Esophageal manometry recommended and performed 2/7/2020  This showed type 2 achalasia  Today, she reports continued dysphagia with both liquids and solid foods  She denies chest pain or weight loss  She normally eats rice, beans and meat  She is able to swallow food, but reports food stops in her epigastrium  She has to wait or shoe sometimes chokes  Eckardt symptom scoring  Dysphagia: each meal (3)  Regurgitation:  Occasional (1)  Chest pain: none (0)  Weight loss: none (0)  Eckardt symptom score= 4    At our last visit, she reported she was unable to have a bowel movement every day followed by diarrhea after several days  Prior colonoscopy at [facility] on 2/15/16 revealed one small sessile polyp in the sigmoid, otherwise normal exam   We discussed the importance of increased water intake and physical activity such as walking  I suggested increasing fiber intake and using Citrucel  Colonocopy 1/28/2020 showed small, external and internal hemorrhoids, otherwise normal     She continues omeprazole 20 mg 30 minutes before breakfast     She is accompanied by her son today  REVIEW OF SYSTEMS IS OTHERWISE NEGATIVE        Historical Information   Past Medical History:   Diagnosis Date    Arthritis     Colon polyp     [facility] [address] [facility]     [facility]    GERD (gastroesophageal reflux disease)     Hard of hearing     Hyperlipidemia     Hypertension     Obesity     Stress incontinence     Swelling of both lower extremities     Thyroid disease     hypo    Use of cane as ambulatory aid     Wears glasses      Past Surgical History:   Procedure Laterality Date    CATARACT EXTRACTION Bilateral      SECTION      COLONOSCOPY  02/15/2016    small polyp, repeat 5yrs    EGD      HYSTERECTOMY      age 48    JOINT REPLACEMENT Right     knee    OOPHORECTOMY      both removed age 48    ND TOTAL KNEE ARTHROPLASTY Right 2019    Procedure: ARTHROPLASTY KNEE TOTAL;  Surgeon: Josefa Puckett DO;  Location: [facility];  Service: Orthopedics    UPPER GASTROINTESTINAL ENDOSCOPY       Social History   Social History     Substance and Sexual Activity   Alcohol Use Never    Frequency: Never     Social History     Substance and Sexual Activity   Drug Use No     Social History     Tobacco Use   Smoking Status Former Smoker    Last attempt to quit: 10/28/1971    Years since quittin 4   Smokeless Tobacco Never Used     Family History   Problem Relation Age of Onset    Diabetes Father     Hypertension Father     Breast cancer Mother [de-identified]    Breast cancer Sister 46    Endometrial cancer Daughter 52       Meds/Allergies       Current Outpatient Medications:     acetaminophen (TYLENOL) 650 mg CR tablet    ascorbic acid (VITAMIN C) 500 mg tablet    aspirin (ECOTRIN) 325 mg EC tablet    aspirin (ECOTRIN) 325 mg EC tablet    enalapril (VASOTEC) 2 5 mg tablet    fluconazole (DIFLUCAN) 150 mg tablet    glimepiride (AMARYL) 2 mg tablet    JARDIANCE 25 MG TABS    levothyroxine 50 mcg tablet    levothyroxine 75 mcg tablet    lovastatin (MEVACOR) 40 MG tablet    omeprazole (PriLOSEC) 20 mg delayed release capsule    OZEMPIC, 1 MG/DOSE, 2 MG/1 5ML SOPN    Allergies   Allergen Reactions    No Known Allergies            Objective     Blood pressure 119/59, pulse 63, temperature (!) 97 4 °F (36 3 °C), temperature source Tympanic, height 4' 11" (1 499 m), weight 84 1 kg (185 lb 6 4 oz), not currently breastfeeding  Body mass index is 37 45 kg/m²  PHYSICAL EXAM:      General Appearance:   Alert, cooperative, no distress   HEENT:   Normocephalic, atraumatic, anicteric      Neck:  Supple, symmetrical, trachea midline   Lungs:   Clear to auscultation bilaterally; no rales, rhonchi or wheezing; respirations unlabored    Heart[de-identified]   Regular rate and rhythm; no murmur, rub, or gallop  Abdomen:   Soft, non-tender, non-distended; normal bowel sounds; no masses, no organomegaly    Genitalia:   Deferred    Rectal:   Deferred    Extremities:  No cyanosis, clubbing or edema    Pulses:  2+ and symmetric    Skin:  No jaundice, rashes, or lesions    Lymph nodes:  No palpable cervical lymphadenopathy        Lab Results:   No visits with results within 1 Day(s) from this visit     Latest known visit with results is:   Lab on 02/11/2020   Component Date Value    TSH 3RD GENERATON 02/11/2020 2 510     Sodium 02/11/2020 140     Potassium 02/11/2020 4 0     Chloride 02/11/2020 109*    CO2 02/11/2020 27     ANION GAP 02/11/2020 4     BUN 02/11/2020 16     Creatinine 02/11/2020 0 79     Glucose, Fasting 02/11/2020 112*    Calcium 02/11/2020 9 9     AST 02/11/2020 9     ALT 02/11/2020 16     Alkaline Phosphatase 02/11/2020 120*    Total Protein 02/11/2020 6 9     Albumin 02/11/2020 3 5     Total Bilirubin 02/11/2020 0 65     eGFR 02/11/2020 75     Hemoglobin A1C 02/11/2020 7 2*    EAG 02/11/2020 160     Cholesterol 02/11/2020 135     Triglycerides 02/11/2020 142     HDL, Direct 02/11/2020 54     LDL Calculated 02/11/2020 53     Non-HDL-Chol (CHOL-HDL) 02/11/2020 81     WBC 02/11/2020 9 16     RBC 02/11/2020 5 16*    Hemoglobin 02/11/2020 14 3     Hematocrit 02/11/2020 46 2*    MCV 02/11/2020 90     MCH 02/11/2020 27 7     MCHC 02/11/2020 31 0*    RDW 02/11/2020 14 9     MPV 02/11/2020 10 9     Platelets 49/49/1735 319     nRBC 02/11/2020 0     Neutrophils Relative 02/11/2020 60     Immat GRANS % 02/11/2020 1     Lymphocytes Relative 02/11/2020 29     Monocytes Relative 02/11/2020 7     Eosinophils Relative 02/11/2020 2     Basophils Relative 02/11/2020 1     Neutrophils Absolute 02/11/2020 5 62     Immature Grans Absolute 02/11/2020 0 05     Lymphocytes Absolute 02/11/2020 2 62     Monocytes Absolute 02/11/2020 0 63     Eosinophils Absolute 02/11/2020 0 18     Basophils Absolute 02/11/2020 0 06     Free T4 02/11/2020 1 08          Radiology Results:   No results found  Attestation:   By signing my name below, Beny Anderson, attest that this documentation has been prepared under the direction and in the presence of TERE Gage  Electronically Signed: Ruth Rajan  3/17/2020        I, Ravindra Chapman, personally performed the services described in this documentation  All medical record entries made by the scribgil were at my direction and in my presence  I have reviewed the chart and discharge instructions and agree that the record reflects my personal performance and is accurate and complete  TERE Gage  3/17/2020

## 2020-03-24 ENCOUNTER — TELEPHONE (OUTPATIENT)
Dept: BARIATRICS | Facility: CLINIC | Age: 75
End: 2020-03-24

## 2020-03-24 NOTE — TELEPHONE ENCOUNTER
Patient was referred to dr Jj Gonzales for achalasia by dr Madden Cerulean, patient would like to daughter to be available to schedule an appt, patient was also offered a virtual visit

## 2020-03-25 ENCOUNTER — TELEPHONE (OUTPATIENT)
Dept: BARIATRICS | Facility: CLINIC | Age: 75
End: 2020-03-25

## 2020-06-25 ENCOUNTER — APPOINTMENT (OUTPATIENT)
Dept: LAB | Facility: IMAGING CENTER | Age: 75
End: 2020-06-25
Payer: COMMERCIAL

## 2020-06-25 DIAGNOSIS — J40 BRONCHITIS: ICD-10-CM

## 2020-06-25 DIAGNOSIS — K22.2 ESOPHAGEAL STRICTURE: ICD-10-CM

## 2020-06-25 LAB
ANION GAP SERPL CALCULATED.3IONS-SCNC: 3 MMOL/L (ref 4–13)
BUN SERPL-MCNC: 22 MG/DL (ref 5–25)
CALCIUM SERPL-MCNC: 9.5 MG/DL (ref 8.3–10.1)
CHLORIDE SERPL-SCNC: 106 MMOL/L (ref 100–108)
CO2 SERPL-SCNC: 30 MMOL/L (ref 21–32)
CREAT SERPL-MCNC: 0.79 MG/DL (ref 0.6–1.3)
GFR SERPL CREATININE-BSD FRML MDRD: 74 ML/MIN/1.73SQ M
GLUCOSE P FAST SERPL-MCNC: 104 MG/DL (ref 65–99)
POTASSIUM SERPL-SCNC: 4.3 MMOL/L (ref 3.5–5.3)
SODIUM SERPL-SCNC: 139 MMOL/L (ref 136–145)

## 2020-06-25 PROCEDURE — 80048 BASIC METABOLIC PNL TOTAL CA: CPT

## 2020-06-25 PROCEDURE — 36415 COLL VENOUS BLD VENIPUNCTURE: CPT

## 2020-06-26 ENCOUNTER — HOSPITAL ENCOUNTER (OUTPATIENT)
Dept: RADIOLOGY | Facility: IMAGING CENTER | Age: 75
Discharge: HOME | End: 2020-06-26
Payer: COMMERCIAL

## 2020-06-26 DIAGNOSIS — J40 BRONCHITIS: ICD-10-CM

## 2020-06-26 DIAGNOSIS — K22.2 ESOPHAGEAL STRICTURE: ICD-10-CM

## 2020-06-26 PROCEDURE — 71260 CT THORAX DX C+: CPT

## 2020-06-26 RX ADMIN — IOHEXOL 85 ML: 350 INJECTION, SOLUTION INTRAVENOUS at 13:11

## 2020-07-01 ENCOUNTER — OFFICE VISIT (OUTPATIENT)
Dept: BARIATRICS | Facility: CLINIC | Age: 75
End: 2020-07-01
Payer: COMMERCIAL

## 2020-07-01 VITALS
WEIGHT: 185 LBS | TEMPERATURE: 97.8 F | SYSTOLIC BLOOD PRESSURE: 120 MMHG | HEART RATE: 73 BPM | HEIGHT: 59 IN | BODY MASS INDEX: 37.29 KG/M2 | DIASTOLIC BLOOD PRESSURE: 62 MMHG

## 2020-07-01 DIAGNOSIS — E11.9 CONTROLLED TYPE 2 DIABETES MELLITUS WITHOUT COMPLICATION, WITHOUT LONG-TERM CURRENT USE OF INSULIN (HCC): ICD-10-CM

## 2020-07-01 DIAGNOSIS — K22.0 ACHALASIA: Primary | ICD-10-CM

## 2020-07-01 DIAGNOSIS — K21.9 GASTROESOPHAGEAL REFLUX DISEASE WITHOUT ESOPHAGITIS: ICD-10-CM

## 2020-07-01 DIAGNOSIS — K22.2 ESOPHAGEAL STRICTURE: ICD-10-CM

## 2020-07-01 PROCEDURE — 4040F PNEUMOC VAC/ADMIN/RCVD: CPT | Performed by: SURGERY

## 2020-07-01 PROCEDURE — 3051F HG A1C>EQUAL 7.0%<8.0%: CPT | Performed by: SURGERY

## 2020-07-01 PROCEDURE — 3078F DIAST BP <80 MM HG: CPT | Performed by: SURGERY

## 2020-07-01 PROCEDURE — 3074F SYST BP LT 130 MM HG: CPT | Performed by: SURGERY

## 2020-07-01 PROCEDURE — 3008F BODY MASS INDEX DOCD: CPT | Performed by: SURGERY

## 2020-07-01 PROCEDURE — 1036F TOBACCO NON-USER: CPT | Performed by: SURGERY

## 2020-07-01 PROCEDURE — 99204 OFFICE O/P NEW MOD 45 MIN: CPT | Performed by: SURGERY

## 2020-07-01 PROCEDURE — 1160F RVW MEDS BY RX/DR IN RCRD: CPT | Performed by: SURGERY

## 2020-07-01 RX ORDER — ACETAMINOPHEN 325 MG/1
975 TABLET ORAL ONCE
Status: CANCELLED | OUTPATIENT
Start: 2020-07-08 | End: 2020-07-01

## 2020-07-01 RX ORDER — CEFAZOLIN SODIUM 2 G/50ML
2000 SOLUTION INTRAVENOUS ONCE
Status: CANCELLED | OUTPATIENT
Start: 2020-07-08 | End: 2020-07-01

## 2020-07-01 RX ORDER — CELECOXIB 200 MG/1
200 CAPSULE ORAL ONCE
Status: CANCELLED | OUTPATIENT
Start: 2020-07-08 | End: 2020-07-01

## 2020-07-01 RX ORDER — GABAPENTIN 300 MG/1
300 CAPSULE ORAL ONCE
Status: CANCELLED | OUTPATIENT
Start: 2020-07-08 | End: 2020-07-01

## 2020-07-01 RX ORDER — HEPARIN SODIUM 5000 [USP'U]/ML
5000 INJECTION, SOLUTION INTRAVENOUS; SUBCUTANEOUS
Status: CANCELLED | OUTPATIENT
Start: 2020-07-09 | End: 2020-07-10

## 2020-07-01 NOTE — PROGRESS NOTES
Final H&P - BARIATRIC SURGERY  Katie Combs 70 y o  female MRN: [id number]  Unit/Bed#:  Encounter: [id number]      HPI:  Katie Combs is a 70 y o  female referred by GI for achalasia  Subjective     The patient is referred to us by Dr Shanae Lewis  Duration of symptoms: >2y  Main symptoms: Progressive dysphagia to solids first and now to liquids, with chest pain, shortness of breath  The patient does not vomit but she does experience reflux and heartburn  The patient also c/o intermittent diarrhea and constipation starting around the time that dysphagia to liquids started  The patient has noticed approx 10 lbs weight loss in 6 months  Current treatment: Prilosec 20mg qd    The patient denies any cardiac issues or any recent workup  Surgical history only significant for   Review of Systems   Constitutional: Negative  HENT: Negative  Eyes: Negative  Respiratory: Negative  Cardiovascular: Negative  Gastrointestinal: Positive for abdominal distention, nausea and vomiting  Endocrine: Negative  Genitourinary: Negative  Musculoskeletal: Negative  Skin: Negative  Allergic/Immunologic: Negative  Neurological: Negative  Hematological: Negative  Psychiatric/Behavioral: Negative          Historical Information   Past Medical History:   Diagnosis Date    Arthritis     Colon polyp     [facility] [address] [facility]     [facility]     GERD (gastroesophageal reflux disease)     Hard of hearing     Hyperlipidemia     Hypertension     Obesity     Stress incontinence     Swelling of both lower extremities     Thyroid disease     hypo    Use of cane as ambulatory aid     Wears glasses      Past Surgical History:   Procedure Laterality Date    CATARACT EXTRACTION Bilateral      SECTION      COLONOSCOPY  02/15/2016    small polyp, repeat 5yrs    EGD      HYSTERECTOMY      age 48    JOINT REPLACEMENT Right     knee    OOPHORECTOMY      both removed age 48  NC TOTAL KNEE ARTHROPLASTY Right 2019    Procedure: ARTHROPLASTY KNEE TOTAL;  Surgeon: Darshan Ruvalcaba DO;  Location: [facility];  Service: Orthopedics    UPPER GASTROINTESTINAL ENDOSCOPY       Social History   Social History     Substance and Sexual Activity   Alcohol Use Never    Frequency: Never     Social History     Substance and Sexual Activity   Drug Use No     Social History     Tobacco Use   Smoking Status Former Smoker    Last attempt to quit: 10/28/1971    Years since quittin 7   Smokeless Tobacco Never Used       Objective       Current Vitals:   Blood Pressure: 120/62 (20)  Pulse: 73 (20)  Temperature: 97 8 °F (36 6 °C) (20)  Temp Source: Temporal (20)  Height: 4' 11" (149 9 cm) (20)  Weight - Scale: 83 9 kg (185 lb) (20)    Invasive Devices     None                 Physical Exam   Constitutional: She is oriented to person, place, and time  She appears well-developed and well-nourished  HENT:   Head: Normocephalic and atraumatic  Nose: Nose normal    Eyes: Conjunctivae and EOM are normal    Neck: Normal range of motion  Cardiovascular: Normal rate  Pulmonary/Chest: Effort normal    Abdominal: Soft  Bowel sounds are normal  She exhibits no distension and no mass  There is no tenderness  There is no rebound and no guarding  No hernia  The abdomen is scaphoid and benign  Well healed  Pfannenstiel incision  Musculoskeletal: Normal range of motion  Neurological: She is alert and oriented to person, place, and time  Skin: Skin is warm  Psychiatric: She has a normal mood and affect   Her behavior is normal  Judgment and thought content normal          Assessment/PLAN:    Kirstin Felix is a 70 y o  female referred to us by GI for type 2 achalasia     ---------------------------------------------------    The patient's workup thus far was reviewed, and includes:    Barium swallow (5/30/19)    IMPRESSION:     Today's findings seem to match the description of the prior study from 2012 with a short segment area of significant narrowing at the gastroesophageal junction causing significant delay of transit of barium from the esophagus to the stomach  There is   esophageal dysmotility  I do not see any definitive evidence of mass or ulceration  A repeat upper endoscopy may be helpful for more definitive assessment of this area  Correlate clinically for the need for possible balloon dilatation  CT Chest (6/26/2020)    IMPRESSION:     Dilated fluid and debris-filled esophagus to the level of the gastroesophageal junction compatible with the history of achalasia with nothing to suggest malignancy  EGD - (1/28/2020 Geme)    FINDINGS:  · Mild eroded and erythematous mucosa in the esophagus  · Large amount of fluid was suctioned from esophagus  Moderately dilated distal and mid esophagus  GE junction appeared to be slightly tight while passing the scope otherwise no apparent stricture or mass in this area  This finding is concerning for  achalasia  · The stomach and duodenum appeared normal  · Performed biopsies in the upper third of the esophagus and lower third of the esophagus    IMPRESSION:  Fluid filled esophagus  Moderately dilated mid and distal esophagus  Mild esophagitis at the GE junction  Slightly tight GE junction noted  No mass or stricture at the GE junction  Normal stomach and duodenum  Random biopsy taken from esophagus  Pathology    Final Diagnosis   A  Esophagus, distal, biopsy:  -  Benign squamous mucosa with mild reactive change and mixed inflammation, cannot exclude mild esophagitis  -  Intraepithelial eosinophils are not increased, negative for features of eosinophilic esophagitis  -  Negative for dysplasia or carcinoma      B    Esophagus, proximal, biopsy:  -  Benign squamous mucosa with mild reactive change and mixed inflammation, cannot exclude mild esophagitis  -  Intraepithelial eosinophils are not increased, negative for features of eosinophilic esophagitis  -  Negative for dysplasia or carcinoma  MANOMETRY    Esophageal manometry  Esophageal motility- all 10 swallows demonstrated abnormal esophageal contractibility pattern consistent with pan esophageal contraction/pressurization  Mean DCI is 870 mmHg  s cm  LES- median IRP is 20 mmHg which is elevated  Impedance-10% complete clearance of liquid bolus swallows     [place] classification- findings concerning for type 2 achalasia    --------------------------------------------------------------------  Her workup is complete  Patient denies any cardiac history  EKG reviewed and MARCIO Tran was reviewed with anesthesia and we will proceed  The patient was consented for a robotic Heller myotomy with Toupet fundoplication                Funmi Jones MD  Bariatric Surgery Fellow  7/1/2020  10:54 AM

## 2020-07-02 ENCOUNTER — TELEPHONE (OUTPATIENT)
Dept: BARIATRICS | Facility: CLINIC | Age: 75
End: 2020-07-02

## 2020-07-02 DIAGNOSIS — K21.9 GASTROESOPHAGEAL REFLUX DISEASE WITHOUT ESOPHAGITIS: Primary | ICD-10-CM

## 2020-07-02 DIAGNOSIS — R10.9 ABDOMINAL PAIN: Primary | ICD-10-CM

## 2020-07-02 RX ORDER — OXYCODONE HYDROCHLORIDE 5 MG/1
5 TABLET ORAL EVERY 4 HOURS PRN
Qty: 30 TABLET | Refills: 0 | Status: SHIPPED | OUTPATIENT
Start: 2020-07-02 | End: 2021-08-04 | Stop reason: ALTCHOICE

## 2020-07-02 NOTE — TELEPHONE ENCOUNTER
patient called for jhonny but was unavailable so she called me she could not go to her PCP for the EKG PCP was on vacation only coming back on 7/8 day of her surgery  I gave Dr Jill Mata patients info to order EKG so she can go to any location

## 2020-07-03 ENCOUNTER — CLINICAL SUPPORT (OUTPATIENT)
Dept: URGENT CARE | Age: 75
End: 2020-07-03
Payer: COMMERCIAL

## 2020-07-03 ENCOUNTER — TRANSCRIBE ORDERS (OUTPATIENT)
Dept: ADMINISTRATIVE | Facility: HOSPITAL | Age: 75
End: 2020-07-03

## 2020-07-03 DIAGNOSIS — K21.9 GASTROESOPHAGEAL REFLUX DISEASE WITHOUT ESOPHAGITIS: Primary | ICD-10-CM

## 2020-07-03 DIAGNOSIS — Z20.822 ENCOUNTER FOR LABORATORY TESTING FOR SEVERE ACUTE RESPIRATORY SYNDROME CORONAVIRUS 2 (SARS-COV-2): ICD-10-CM

## 2020-07-03 DIAGNOSIS — K21.9 GASTROESOPHAGEAL REFLUX DISEASE WITHOUT ESOPHAGITIS: ICD-10-CM

## 2020-07-03 PROCEDURE — U0003 INFECTIOUS AGENT DETECTION BY NUCLEIC ACID (DNA OR RNA); SEVERE ACUTE RESPIRATORY SYNDROME CORONAVIRUS 2 (SARS-COV-2) (CORONAVIRUS DISEASE [COVID-19]), AMPLIFIED PROBE TECHNIQUE, MAKING USE OF HIGH THROUGHPUT TECHNOLOGIES AS DESCRIBED BY CMS-2020-01-R: HCPCS | Performed by: SURGERY

## 2020-07-03 PROCEDURE — 93005 ELECTROCARDIOGRAM TRACING: CPT

## 2020-07-06 ENCOUNTER — TELEPHONE (OUTPATIENT)
Dept: BARIATRICS | Facility: CLINIC | Age: 75
End: 2020-07-06

## 2020-07-06 LAB
ATRIAL RATE: 59 BPM
P AXIS: 55 DEGREES
PR INTERVAL: 186 MS
QRS AXIS: 73 DEGREES
QRSD INTERVAL: 72 MS
QT INTERVAL: 402 MS
QTC INTERVAL: 397 MS
SARS-COV-2 RNA SPEC QL NAA+PROBE: NOT DETECTED
T WAVE AXIS: 45 DEGREES
VENTRICULAR RATE: 59 BPM

## 2020-07-06 PROCEDURE — 93010 ELECTROCARDIOGRAM REPORT: CPT | Performed by: INTERNAL MEDICINE

## 2020-07-06 NOTE — TELEPHONE ENCOUNTER
Spoke with Rosalba @ [facility] in [place] [address] where patient was to have EKG done prior to surgery due to her PCP being on vacation  Patient completed a rhythm strip that was unable to be reviewed for PAT'S  Per Mason Romeo she will scan into patients chart for PAT'S and Asiya Turk in [address] will have a cardiologist review  Also spoke to patient to inform her if anything looks abnormal once reviewed someone would be in touch with her

## 2020-07-07 ENCOUNTER — ANESTHESIA EVENT (OUTPATIENT)
Dept: PERIOP | Facility: HOSPITAL | Age: 75
DRG: 328 | End: 2020-07-07
Payer: COMMERCIAL

## 2020-07-07 NOTE — PRE-PROCEDURE INSTRUCTIONS
Pre-Surgery Instructions:   Medication Instructions    acetaminophen (TYLENOL) 650 mg CR tablet Instructed patient per Anesthesia Guidelines   ascorbic acid (VITAMIN C) 500 mg tablet Instructed patient per Anesthesia Guidelines   Dulaglutide (Trulicity) 1 5 UZ/3 9BR SOPN Instructed patient per Anesthesia Guidelines   enalapril (VASOTEC) 2 5 mg tablet Instructed patient per Anesthesia Guidelines   fluconazole (DIFLUCAN) 150 mg tablet Instructed patient per Anesthesia Guidelines   glimepiride (AMARYL) 2 mg tablet Instructed patient per Anesthesia Guidelines   JARDIANCE 25 MG TABS Instructed patient per Anesthesia Guidelines   levothyroxine 50 mcg tablet Instructed patient per Anesthesia Guidelines   levothyroxine 75 mcg tablet Instructed patient per Anesthesia Guidelines   lovastatin (MEVACOR) 40 MG tablet Instructed patient per Anesthesia Guidelines   omeprazole (PriLOSEC) 20 mg delayed release capsule Instructed patient per Anesthesia Guidelines   VITAMIN D PO Instructed patient per Anesthesia Guidelines  Patient per anesth guidelines  instructed *to take*omeprazole and levothyroxine with a sip of water the morning of surgery  Patient given/ instructed on use of chlorhexidine soap per hospital protocol    Patient instructed to stop all ASA, NSAIDS, vitamins and herbal supplements one week prior to surgery or per Dr Sheng Grimaldo

## 2020-07-08 ENCOUNTER — ANESTHESIA (OUTPATIENT)
Dept: PERIOP | Facility: HOSPITAL | Age: 75
DRG: 328 | End: 2020-07-08
Payer: COMMERCIAL

## 2020-07-08 ENCOUNTER — HOSPITAL ENCOUNTER (INPATIENT)
Facility: HOSPITAL | Age: 75
LOS: 1 days | Discharge: HOME | DRG: 328 | End: 2020-07-09
Attending: SURGERY | Admitting: SURGERY
Payer: COMMERCIAL

## 2020-07-08 DIAGNOSIS — K22.2 ESOPHAGEAL STRICTURE: ICD-10-CM

## 2020-07-08 DIAGNOSIS — K22.0 ACHALASIA: Primary | ICD-10-CM

## 2020-07-08 DIAGNOSIS — K21.9 GASTROESOPHAGEAL REFLUX DISEASE: ICD-10-CM

## 2020-07-08 DIAGNOSIS — E78.2 MIXED HYPERLIPIDEMIA: ICD-10-CM

## 2020-07-08 DIAGNOSIS — E11.69 DIABETES MELLITUS TYPE 2 IN OBESE (HCC): ICD-10-CM

## 2020-07-08 DIAGNOSIS — I10 BENIGN ESSENTIAL HTN: ICD-10-CM

## 2020-07-08 DIAGNOSIS — E03.9 ACQUIRED HYPOTHYROIDISM: ICD-10-CM

## 2020-07-08 DIAGNOSIS — E66.9 DIABETES MELLITUS TYPE 2 IN OBESE (HCC): ICD-10-CM

## 2020-07-08 LAB
ANION GAP SERPL CALCULATED.3IONS-SCNC: 6 MMOL/L (ref 4–13)
BASOPHILS # BLD AUTO: 0.04 THOUSANDS/ΜL (ref 0–0.1)
BASOPHILS NFR BLD AUTO: 1 % (ref 0–1)
BUN SERPL-MCNC: 23 MG/DL (ref 5–25)
CALCIUM SERPL-MCNC: 9.4 MG/DL (ref 8.3–10.1)
CHLORIDE SERPL-SCNC: 105 MMOL/L (ref 100–108)
CO2 SERPL-SCNC: 29 MMOL/L (ref 21–32)
CREAT SERPL-MCNC: 0.87 MG/DL (ref 0.6–1.3)
EOSINOPHIL # BLD AUTO: 0.14 THOUSAND/ΜL (ref 0–0.61)
EOSINOPHIL NFR BLD AUTO: 2 % (ref 0–6)
ERYTHROCYTE [DISTWIDTH] IN BLOOD BY AUTOMATED COUNT: 14.6 % (ref 11.6–15.1)
GFR SERPL CREATININE-BSD FRML MDRD: 66 ML/MIN/1.73SQ M
GLUCOSE P FAST SERPL-MCNC: 112 MG/DL (ref 65–99)
GLUCOSE SERPL-MCNC: 104 MG/DL (ref 65–140)
GLUCOSE SERPL-MCNC: 112 MG/DL (ref 65–140)
GLUCOSE SERPL-MCNC: 175 MG/DL (ref 65–140)
GLUCOSE SERPL-MCNC: 178 MG/DL (ref 65–140)
HCT VFR BLD AUTO: 43.7 % (ref 34.8–46.1)
HGB BLD-MCNC: 14 G/DL (ref 11.5–15.4)
IMM GRANULOCYTES # BLD AUTO: 0.03 THOUSAND/UL (ref 0–0.2)
IMM GRANULOCYTES NFR BLD AUTO: 1 % (ref 0–2)
LYMPHOCYTES # BLD AUTO: 2.34 THOUSANDS/ΜL (ref 0.6–4.47)
LYMPHOCYTES NFR BLD AUTO: 37 % (ref 14–44)
MCH RBC QN AUTO: 28.4 PG (ref 26.8–34.3)
MCHC RBC AUTO-ENTMCNC: 32 G/DL (ref 31.4–37.4)
MCV RBC AUTO: 89 FL (ref 82–98)
MONOCYTES # BLD AUTO: 0.64 THOUSAND/ΜL (ref 0.17–1.22)
MONOCYTES NFR BLD AUTO: 10 % (ref 4–12)
NEUTROPHILS # BLD AUTO: 3.09 THOUSANDS/ΜL (ref 1.85–7.62)
NEUTS SEG NFR BLD AUTO: 49 % (ref 43–75)
NRBC BLD AUTO-RTO: 0 /100 WBCS
PLATELET # BLD AUTO: 275 THOUSANDS/UL (ref 149–390)
PMV BLD AUTO: 10.4 FL (ref 8.9–12.7)
POTASSIUM SERPL-SCNC: 3.9 MMOL/L (ref 3.5–5.3)
RBC # BLD AUTO: 4.93 MILLION/UL (ref 3.81–5.12)
SODIUM SERPL-SCNC: 140 MMOL/L (ref 136–145)
WBC # BLD AUTO: 6.28 THOUSAND/UL (ref 4.31–10.16)

## 2020-07-08 PROCEDURE — 43279 LAP MYOTOMY HELLER: CPT | Performed by: SURGERY

## 2020-07-08 PROCEDURE — 88302 TISSUE EXAM BY PATHOLOGIST: CPT | Performed by: PATHOLOGY

## 2020-07-08 PROCEDURE — 85025 COMPLETE CBC W/AUTO DIFF WBC: CPT | Performed by: SURGERY

## 2020-07-08 PROCEDURE — 8E0W4CZ ROBOTIC ASSISTED PROCEDURE OF TRUNK REGION, PERCUTANEOUS ENDOSCOPIC APPROACH: ICD-10-PCS | Performed by: SURGERY

## 2020-07-08 PROCEDURE — C9290 INJ, BUPIVACAINE LIPOSOME: HCPCS | Performed by: SURGERY

## 2020-07-08 PROCEDURE — 0D844ZZ DIVISION OF ESOPHAGOGASTRIC JUNCTION, PERCUTANEOUS ENDOSCOPIC APPROACH: ICD-10-PCS | Performed by: SURGERY

## 2020-07-08 PROCEDURE — 43282 LAP PARAESOPH HER RPR W/MESH: CPT | Performed by: SURGERY

## 2020-07-08 PROCEDURE — C1781 MESH (IMPLANTABLE): HCPCS | Performed by: SURGERY

## 2020-07-08 PROCEDURE — 82948 REAGENT STRIP/BLOOD GLUCOSE: CPT

## 2020-07-08 PROCEDURE — 80048 BASIC METABOLIC PNL TOTAL CA: CPT | Performed by: SURGERY

## 2020-07-08 PROCEDURE — 0BUT4KZ SUPPLEMENT DIAPHRAGM WITH NONAUTOLOGOUS TISSUE SUBSTITUTE, PERCUTANEOUS ENDOSCOPIC APPROACH: ICD-10-PCS | Performed by: SURGERY

## 2020-07-08 PROCEDURE — 0DV44ZZ RESTRICTION OF ESOPHAGOGASTRIC JUNCTION, PERCUTANEOUS ENDOSCOPIC APPROACH: ICD-10-PCS | Performed by: SURGERY

## 2020-07-08 DEVICE — BIO-A TISSUE REINFORCEMENT 7CMX10CM
Type: IMPLANTABLE DEVICE | Site: ABDOMEN | Status: FUNCTIONAL
Brand: GORE BIO-A TISSUE REINFORCEMENT

## 2020-07-08 RX ORDER — SODIUM CHLORIDE 9 MG/ML
125 INJECTION, SOLUTION INTRAVENOUS CONTINUOUS
Status: DISCONTINUED | OUTPATIENT
Start: 2020-07-08 | End: 2020-07-08

## 2020-07-08 RX ORDER — GABAPENTIN 300 MG/1
300 CAPSULE ORAL ONCE
Status: COMPLETED | OUTPATIENT
Start: 2020-07-08 | End: 2020-07-08

## 2020-07-08 RX ORDER — CEFAZOLIN SODIUM 2 G/50ML
2000 SOLUTION INTRAVENOUS ONCE
Status: COMPLETED | OUTPATIENT
Start: 2020-07-08 | End: 2020-07-08

## 2020-07-08 RX ORDER — SODIUM CHLORIDE, SODIUM LACTATE, POTASSIUM CHLORIDE, CALCIUM CHLORIDE 600; 310; 30; 20 MG/100ML; MG/100ML; MG/100ML; MG/100ML
INJECTION, SOLUTION INTRAVENOUS CONTINUOUS PRN
Status: DISCONTINUED | OUTPATIENT
Start: 2020-07-08 | End: 2020-07-08 | Stop reason: SURG

## 2020-07-08 RX ORDER — SODIUM CHLORIDE 9 MG/ML
INJECTION, SOLUTION INTRAVENOUS CONTINUOUS PRN
Status: DISCONTINUED | OUTPATIENT
Start: 2020-07-08 | End: 2020-07-08 | Stop reason: SURG

## 2020-07-08 RX ORDER — MAGNESIUM HYDROXIDE 1200 MG/15ML
LIQUID ORAL AS NEEDED
Status: DISCONTINUED | OUTPATIENT
Start: 2020-07-08 | End: 2020-07-08 | Stop reason: HOSPADM

## 2020-07-08 RX ORDER — SODIUM CHLORIDE, SODIUM LACTATE, POTASSIUM CHLORIDE, CALCIUM CHLORIDE 600; 310; 30; 20 MG/100ML; MG/100ML; MG/100ML; MG/100ML
100 INJECTION, SOLUTION INTRAVENOUS CONTINUOUS
Status: DISCONTINUED | OUTPATIENT
Start: 2020-07-08 | End: 2020-07-09 | Stop reason: HOSPADM

## 2020-07-08 RX ORDER — LEVOTHYROXINE SODIUM 0.05 MG/1
50 TABLET ORAL
Status: DISCONTINUED | OUTPATIENT
Start: 2020-07-09 | End: 2020-07-09 | Stop reason: HOSPADM

## 2020-07-08 RX ORDER — HYDROMORPHONE HCL/PF 1 MG/ML
SYRINGE (ML) INJECTION AS NEEDED
Status: DISCONTINUED | OUTPATIENT
Start: 2020-07-08 | End: 2020-07-08 | Stop reason: SURG

## 2020-07-08 RX ORDER — LEVOTHYROXINE SODIUM 0.07 MG/1
75 TABLET ORAL
Status: DISCONTINUED | OUTPATIENT
Start: 2020-07-11 | End: 2020-07-09 | Stop reason: HOSPADM

## 2020-07-08 RX ORDER — ONDANSETRON 2 MG/ML
4 INJECTION INTRAMUSCULAR; INTRAVENOUS ONCE AS NEEDED
Status: DISCONTINUED | OUTPATIENT
Start: 2020-07-08 | End: 2020-07-08 | Stop reason: HOSPADM

## 2020-07-08 RX ORDER — ACETAMINOPHEN 325 MG/1
975 TABLET ORAL ONCE
Status: COMPLETED | OUTPATIENT
Start: 2020-07-08 | End: 2020-07-08

## 2020-07-08 RX ORDER — SIMETHICONE 80 MG
80 TABLET,CHEWABLE ORAL 4 TIMES DAILY PRN
Status: DISCONTINUED | OUTPATIENT
Start: 2020-07-08 | End: 2020-07-09 | Stop reason: HOSPADM

## 2020-07-08 RX ORDER — PROPOFOL 10 MG/ML
INJECTION, EMULSION INTRAVENOUS AS NEEDED
Status: DISCONTINUED | OUTPATIENT
Start: 2020-07-08 | End: 2020-07-08 | Stop reason: SURG

## 2020-07-08 RX ORDER — LABETALOL 20 MG/4 ML (5 MG/ML) INTRAVENOUS SYRINGE
5 ONCE
Status: COMPLETED | OUTPATIENT
Start: 2020-07-08 | End: 2020-07-08

## 2020-07-08 RX ORDER — SCOLOPAMINE TRANSDERMAL SYSTEM 1 MG/1
1 PATCH, EXTENDED RELEASE TRANSDERMAL
Status: DISCONTINUED | OUTPATIENT
Start: 2020-07-08 | End: 2020-07-09 | Stop reason: HOSPADM

## 2020-07-08 RX ORDER — MORPHINE SULFATE 4 MG/ML
4 INJECTION, SOLUTION INTRAMUSCULAR; INTRAVENOUS EVERY 2 HOUR PRN
Status: DISCONTINUED | OUTPATIENT
Start: 2020-07-08 | End: 2020-07-09 | Stop reason: HOSPADM

## 2020-07-08 RX ORDER — DEXAMETHASONE SODIUM PHOSPHATE 10 MG/ML
INJECTION, SOLUTION INTRAMUSCULAR; INTRAVENOUS AS NEEDED
Status: DISCONTINUED | OUTPATIENT
Start: 2020-07-08 | End: 2020-07-08 | Stop reason: SURG

## 2020-07-08 RX ORDER — BUPIVACAINE HYDROCHLORIDE 5 MG/ML
INJECTION, SOLUTION EPIDURAL; INTRACAUDAL AS NEEDED
Status: DISCONTINUED | OUTPATIENT
Start: 2020-07-08 | End: 2020-07-08 | Stop reason: HOSPADM

## 2020-07-08 RX ORDER — FENTANYL CITRATE 50 UG/ML
INJECTION, SOLUTION INTRAMUSCULAR; INTRAVENOUS AS NEEDED
Status: DISCONTINUED | OUTPATIENT
Start: 2020-07-08 | End: 2020-07-08 | Stop reason: SURG

## 2020-07-08 RX ORDER — HYDRALAZINE HYDROCHLORIDE 20 MG/ML
5 INJECTION INTRAMUSCULAR; INTRAVENOUS EVERY 6 HOURS PRN
Status: DISCONTINUED | OUTPATIENT
Start: 2020-07-08 | End: 2020-07-09 | Stop reason: HOSPADM

## 2020-07-08 RX ORDER — ONDANSETRON 2 MG/ML
4 INJECTION INTRAMUSCULAR; INTRAVENOUS EVERY 6 HOURS SCHEDULED
Status: DISCONTINUED | OUTPATIENT
Start: 2020-07-08 | End: 2020-07-09 | Stop reason: HOSPADM

## 2020-07-08 RX ORDER — DIPHENHYDRAMINE HYDROCHLORIDE 50 MG/ML
25 INJECTION INTRAMUSCULAR; INTRAVENOUS EVERY 6 HOURS PRN
Status: DISCONTINUED | OUTPATIENT
Start: 2020-07-08 | End: 2020-07-09

## 2020-07-08 RX ORDER — CELECOXIB 200 MG/1
200 CAPSULE ORAL ONCE
Status: COMPLETED | OUTPATIENT
Start: 2020-07-08 | End: 2020-07-08

## 2020-07-08 RX ORDER — HYDROMORPHONE HCL/PF 1 MG/ML
0.5 SYRINGE (ML) INJECTION EVERY 4 HOURS PRN
Status: DISCONTINUED | OUTPATIENT
Start: 2020-07-08 | End: 2020-07-09 | Stop reason: HOSPADM

## 2020-07-08 RX ORDER — METOCLOPRAMIDE HYDROCHLORIDE 5 MG/ML
10 INJECTION INTRAMUSCULAR; INTRAVENOUS EVERY 6 HOURS PRN
Status: DISCONTINUED | OUTPATIENT
Start: 2020-07-08 | End: 2020-07-09 | Stop reason: HOSPADM

## 2020-07-08 RX ORDER — EPHEDRINE SULFATE 50 MG/ML
INJECTION INTRAVENOUS AS NEEDED
Status: DISCONTINUED | OUTPATIENT
Start: 2020-07-08 | End: 2020-07-08 | Stop reason: SURG

## 2020-07-08 RX ORDER — ONDANSETRON 2 MG/ML
INJECTION INTRAMUSCULAR; INTRAVENOUS AS NEEDED
Status: DISCONTINUED | OUTPATIENT
Start: 2020-07-08 | End: 2020-07-08 | Stop reason: SURG

## 2020-07-08 RX ORDER — VECURONIUM BROMIDE 1 MG/ML
INJECTION, POWDER, LYOPHILIZED, FOR SOLUTION INTRAVENOUS AS NEEDED
Status: DISCONTINUED | OUTPATIENT
Start: 2020-07-08 | End: 2020-07-08 | Stop reason: SURG

## 2020-07-08 RX ORDER — ACETAMINOPHEN 325 MG/1
975 TABLET ORAL EVERY 8 HOURS
Status: DISCONTINUED | OUTPATIENT
Start: 2020-07-08 | End: 2020-07-09 | Stop reason: HOSPADM

## 2020-07-08 RX ORDER — ACETAMINOPHEN 160 MG/5ML
975 SUSPENSION, ORAL (FINAL DOSE FORM) ORAL EVERY 8 HOURS
Status: DISCONTINUED | OUTPATIENT
Start: 2020-07-08 | End: 2020-07-09 | Stop reason: HOSPADM

## 2020-07-08 RX ORDER — FENTANYL CITRATE/PF 50 MCG/ML
25 SYRINGE (ML) INJECTION
Status: DISCONTINUED | OUTPATIENT
Start: 2020-07-08 | End: 2020-07-08 | Stop reason: HOSPADM

## 2020-07-08 RX ORDER — HEPARIN SODIUM 5000 [USP'U]/ML
5000 INJECTION, SOLUTION INTRAVENOUS; SUBCUTANEOUS
Status: COMPLETED | OUTPATIENT
Start: 2020-07-08 | End: 2020-07-08

## 2020-07-08 RX ORDER — LIDOCAINE HYDROCHLORIDE 10 MG/ML
INJECTION, SOLUTION EPIDURAL; INFILTRATION; INTRACAUDAL; PERINEURAL AS NEEDED
Status: DISCONTINUED | OUTPATIENT
Start: 2020-07-08 | End: 2020-07-08 | Stop reason: SURG

## 2020-07-08 RX ORDER — HYDROMORPHONE HCL/PF 1 MG/ML
0.5 SYRINGE (ML) INJECTION
Status: DISCONTINUED | OUTPATIENT
Start: 2020-07-08 | End: 2020-07-08 | Stop reason: HOSPADM

## 2020-07-08 RX ORDER — PROMETHAZINE HYDROCHLORIDE 25 MG/ML
12.5 INJECTION, SOLUTION INTRAMUSCULAR; INTRAVENOUS EVERY 6 HOURS PRN
Status: DISCONTINUED | OUTPATIENT
Start: 2020-07-08 | End: 2020-07-09 | Stop reason: HOSPADM

## 2020-07-08 RX ORDER — PRAVASTATIN SODIUM 40 MG
40 TABLET ORAL
Status: DISCONTINUED | OUTPATIENT
Start: 2020-07-08 | End: 2020-07-09 | Stop reason: HOSPADM

## 2020-07-08 RX ORDER — SODIUM CHLORIDE 9 MG/ML
INJECTION, SOLUTION INTRAVENOUS AS NEEDED
Status: DISCONTINUED | OUTPATIENT
Start: 2020-07-08 | End: 2020-07-08 | Stop reason: HOSPADM

## 2020-07-08 RX ADMIN — CELECOXIB 200 MG: 200 CAPSULE ORAL at 12:21

## 2020-07-08 RX ADMIN — VECURONIUM BROMIDE 3 MG: 1 INJECTION, POWDER, LYOPHILIZED, FOR SOLUTION INTRAVENOUS at 14:46

## 2020-07-08 RX ADMIN — EPHEDRINE SULFATE 10 MG: 50 INJECTION, SOLUTION INTRAVENOUS at 13:58

## 2020-07-08 RX ADMIN — FENTANYL CITRATE 100 MCG: 50 INJECTION, SOLUTION INTRAMUSCULAR; INTRAVENOUS at 14:06

## 2020-07-08 RX ADMIN — ONDANSETRON 4 MG: 2 INJECTION INTRAMUSCULAR; INTRAVENOUS at 23:52

## 2020-07-08 RX ADMIN — PROPOFOL 150 MG: 10 INJECTION, EMULSION INTRAVENOUS at 13:11

## 2020-07-08 RX ADMIN — SODIUM CHLORIDE, SODIUM LACTATE, POTASSIUM CHLORIDE, AND CALCIUM CHLORIDE: .6; .31; .03; .02 INJECTION, SOLUTION INTRAVENOUS at 15:21

## 2020-07-08 RX ADMIN — FENTANYL CITRATE 100 MCG: 50 INJECTION, SOLUTION INTRAMUSCULAR; INTRAVENOUS at 13:11

## 2020-07-08 RX ADMIN — GABAPENTIN 300 MG: 300 CAPSULE ORAL at 12:21

## 2020-07-08 RX ADMIN — CEFAZOLIN SODIUM 2000 MG: 2 SOLUTION INTRAVENOUS at 13:10

## 2020-07-08 RX ADMIN — EPHEDRINE SULFATE 10 MG: 50 INJECTION, SOLUTION INTRAVENOUS at 14:00

## 2020-07-08 RX ADMIN — EPHEDRINE SULFATE 10 MG: 50 INJECTION, SOLUTION INTRAVENOUS at 14:57

## 2020-07-08 RX ADMIN — SUGAMMADEX 350 MG: 100 INJECTION, SOLUTION INTRAVENOUS at 15:19

## 2020-07-08 RX ADMIN — DEXAMETHASONE SODIUM PHOSPHATE 4 MG: 10 INJECTION, SOLUTION INTRAMUSCULAR; INTRAVENOUS at 13:31

## 2020-07-08 RX ADMIN — MORPHINE SULFATE 2 MG: 2 INJECTION, SOLUTION INTRAMUSCULAR; INTRAVENOUS at 19:46

## 2020-07-08 RX ADMIN — ONDANSETRON 4 MG: 2 INJECTION INTRAMUSCULAR; INTRAVENOUS at 13:31

## 2020-07-08 RX ADMIN — ACETAMINOPHEN 975 MG: 325 TABLET ORAL at 12:21

## 2020-07-08 RX ADMIN — HYDROMORPHONE HYDROCHLORIDE 0.5 MG: 1 INJECTION, SOLUTION INTRAMUSCULAR; INTRAVENOUS; SUBCUTANEOUS at 15:26

## 2020-07-08 RX ADMIN — FAMOTIDINE 20 MG: 10 INJECTION INTRAVENOUS at 18:04

## 2020-07-08 RX ADMIN — SODIUM CHLORIDE, SODIUM LACTATE, POTASSIUM CHLORIDE, AND CALCIUM CHLORIDE 100 ML/HR: .6; .31; .03; .02 INJECTION, SOLUTION INTRAVENOUS at 16:35

## 2020-07-08 RX ADMIN — LABETALOL 20 MG/4 ML (5 MG/ML) INTRAVENOUS SYRINGE 5 MG: at 16:14

## 2020-07-08 RX ADMIN — SODIUM CHLORIDE, SODIUM LACTATE, POTASSIUM CHLORIDE, AND CALCIUM CHLORIDE: .6; .31; .03; .02 INJECTION, SOLUTION INTRAVENOUS at 14:18

## 2020-07-08 RX ADMIN — HEPARIN SODIUM 5000 UNITS: 5000 INJECTION INTRAVENOUS; SUBCUTANEOUS at 12:30

## 2020-07-08 RX ADMIN — SCOPALAMINE 1 PATCH: 1 PATCH, EXTENDED RELEASE TRANSDERMAL at 18:35

## 2020-07-08 RX ADMIN — SODIUM CHLORIDE: 0.9 INJECTION, SOLUTION INTRAVENOUS at 13:31

## 2020-07-08 RX ADMIN — SODIUM CHLORIDE 125 ML/HR: 0.9 INJECTION, SOLUTION INTRAVENOUS at 12:20

## 2020-07-08 RX ADMIN — VECURONIUM BROMIDE 7 MG: 1 INJECTION, POWDER, LYOPHILIZED, FOR SOLUTION INTRAVENOUS at 13:12

## 2020-07-08 RX ADMIN — LIDOCAINE HYDROCHLORIDE 50 MG: 10 INJECTION, SOLUTION EPIDURAL; INFILTRATION; INTRACAUDAL; PERINEURAL at 13:11

## 2020-07-08 NOTE — ANESTHESIA PREPROCEDURE EVALUATION
Review of Systems/Medical History  Patient summary reviewed  Chart reviewed  No history of anesthetic complications     Cardiovascular  EKG reviewed, Hyperlipidemia, Hypertension controlled, PVD (venous insufficiency),    Pulmonary  Smoker ex-smoker  ,        GI/Hepatic    GERD poorly controlled,  Hiatal hernia,        Negative  ROS        Endo/Other  Diabetes well controlled type 2 Insulin, History of thyroid disease , hypothyroidism,   Obesity    GYN       Hematology  Negative hematology ROS      Musculoskeletal    Arthritis     Neurology   Psychology   Negative psychology ROS              Physical Exam    Airway    Mallampati score: II  TM Distance: >3 FB  Neck ROM: full     Dental   No notable dental hx     Cardiovascular  Rhythm: regular, Rate: normal, Cardiovascular exam normal    Pulmonary  Pulmonary exam normal Breath sounds clear to auscultation,     Other Findings        Anesthesia Plan  ASA Score- 2     Anesthesia Type- general with ASA Monitors  Additional Monitors:   Airway Plan: ETT  Plan Factors-    Induction- intravenous  Postoperative Plan-     Informed Consent- Anesthetic plan and risks discussed with patient

## 2020-07-08 NOTE — ANESTHESIA POSTPROCEDURE EVALUATION
Post-Op Assessment Note    CV Status:  Stable    Pain management: adequate     Mental Status:  Alert and awake   Hydration Status:  Euvolemic   PONV Controlled:  Controlled   Airway Patency:  Patent   Post Op Vitals Reviewed: Yes      Staff: Anesthesiologist           /68 (07/08/20 1553)    Temp     Pulse 74 (07/08/20 1553)   Resp 22 (07/08/20 1553)    SpO2 97 % (07/08/20 1553)

## 2020-07-08 NOTE — OP NOTE
OPERATIVE REPORT  PATIENT NAME: Debby Monroe    :  1945  MRN: [id number]  Pt Location: [facility] OR ROOM 06    SURGERY DATE: 2020    Surgeon(s) and Role:     * Julian Mayorga MD - Primary     * Belle Hutchison MD - Fellow    Preop Diagnosis:  Achalasia [K22 0]  Gastroesophageal reflux disease [K21 9]  Esophageal stricture [K22 2]  Diabetes mellitus type 2 in obese ([facility] [address] ) [E11 69, E66 9]    Post-Op Diagnosis Codes:     * Achalasia [K22 0]     * Gastroesophageal reflux disease [K21 9]     * Esophageal stricture [K22 2]     * Diabetes mellitus type 2 in obese ([facility] [address] ) [E11 69, E66 9]    Procedure(s) (LRB):  LAPAROSCOPIC HELLER MYOTOMY WITH TOUPET FUNDOPLICATION  W ROBOTICS (N/A)    Specimen(s):  ID Type Source Tests Collected by Time Destination   1 : hernia sac Tissue Soft Tissue, Other TISSUE EXAM Julian Mayorga MD 2020 1441        Estimated Blood Loss:   20 ml    Drains:  NG/OG/Enteral Tube Orogastric Right mouth (Active)   Number of days: 0       Anesthesia Type:   General    Operative Indications:  Achalasia [K22 0]  Gastroesophageal reflux disease [K21 9]  Esophageal stricture [K22 2]  Diabetes mellitus type 2 in obese ([facility] [address] ) [E11 69, E66 9]      Operative Findings:  Paraesophageal hernia    Complications:   None    Procedure and Technique:  Robotic assisted paraesophageal hernia repair with bio a mesh  Robotic assisted Heller myotomy with Toupet fundoplication   I was present for the entire procedure    Patient Disposition:  hemodynamically stable     No qualified resident was available  Assistant was necessary for instrument exchange and counter traction and assistance with stapling     Indication:   Patient is a 37-year-old female who presented with dysphagia chronic nausea and vomiting she had an extensive workup and was diagnosed with Achalasia type 2  Patient was consented for a Heller myotomy and Toupet fundoplication  Procedure:   The patient was brought to the operating room and placed in a supine position  The patient received a dose of IV antibiotics and a dose of subcutaneous Lovenox prior to the procedure  The patient was induced under general endotracheal anesthesia  The abdominal wall was prepped and draped under sterile conditions in the usual fashion  The procedure was started by obtaining access to the abdominal cavity using a Veress needle to the left side of the midline around 6 inches from the xiphoid the abdominal cavity was insufflated with CO2 to a pressure of 15 mmHg  After that, the abdomen was entered with an 8 mm trocar using an Optiview trocar under direct visualization  At that point, an 8 and 12 mm robotic trocars were placed on the right side of the abdominal wall, under direct visualization, and another 8 mm robotic trocar and 12 mm assistant port were placed on the left side of the abdominal wall, also under direct visualization  A TAP block was performed using 20 ml of Exparel mixed with saline and 0 5 % Marcaine for a total of 100 ml  The patient was then placed in a reverse Trendelenburg position  A Melvin retractor was placed through a small stab incision below the xiphoid and was used to retract the left lobe of the liver in a medial fashion  The robot was then brought into the surgical field and the arms docked  An OG tube was placed to decompress the stomach and then removed immediately  The angle of His was then dissected using blunt dissection and the vessel sealer  The phrenoesophageal ligament was also dissected anteriorly and  a paraesophageal hernia was identified  The decision was then made to repair the hernia posteriorly  Right adolph and left adolph were dissected away from the hernia sac and skeletonized all the way down to the confluence  Esophagus was kept out of harm's way during the dissection  The dissection was completed circumferentially around the esophagus   Right adolph and left adolph were then approximated using 0 Ethibond sutures placed in an interrupted fashion  The hernia sac was completely excised and the fat pad covering the GE junction was dissected using the vessel sealer to obtain hemostasis after the GE junction was identified using a ruler and a marking pen we marked our myotomy extending onto the esophagus for 6 cm  and 4 cm down onto the gastric wall  Of note esophagus was dissected all the way up to the hilum using a combination of blunt dissection and with the use of the vessel sealer  The myotomy was started at the level of the GE junction using a hook cautery and then a plane was dissected with the use of the vessel sealer both longitudinal and circular muscle layers of the esophagus were taken down with the vessel sealer and the esophageal mucosa was kept out of harm's way throughout the myotomy, the myotomy was then extended downward onto the gastric wall using the vessel sealer and the hook cautery  At that point, we turned our attention to the short gastric vessels  and using the vessel sealer, the short gastric vessels  were taken down  The angle of His was also dissected and all the posterior attachments of the fundus were taken down with the vessel sealer and with sharp dissection, the spleen was kept out of harms way and the left adolph was exposed and the stomach was completely mobilized off the left adolph and rotated medially  There were some posterior attachments to the posterior wall of the stomach and those were taken down sharply with laparoscopic scissors  At that point the fundus was brought posteriorly behind to the esophagus in a shoe shine fashion without any tension and a 598 fundoplication or Toupet fundoplication was performed in a way to marsupialize the myotomy site  The right myotomy site was sutured to the posterior fundus on the right side and the left myotomy site was sutured to the anterior fundus on the left side using interrupted 0 Ethibond sutures  All trocars were removed under direct visualization  All skin edges were approximated using 4-0 Monocryl in an interrupted inverted fashion     The patient was extubated and transferred to the PACU in stable condition    SIGNATURE: Jone Jackson MD  DATE: July 8, 2020  TIME: 3:20 PM

## 2020-07-09 ENCOUNTER — APPOINTMENT (INPATIENT)
Dept: RADIOLOGY | Facility: HOSPITAL | Age: 75
DRG: 328 | End: 2020-07-09
Payer: COMMERCIAL

## 2020-07-09 VITALS
WEIGHT: 185 LBS | HEIGHT: 59 IN | OXYGEN SATURATION: 96 % | DIASTOLIC BLOOD PRESSURE: 63 MMHG | RESPIRATION RATE: 18 BRPM | TEMPERATURE: 97.9 F | SYSTOLIC BLOOD PRESSURE: 139 MMHG | BODY MASS INDEX: 37.29 KG/M2 | HEART RATE: 65 BPM

## 2020-07-09 LAB
ANION GAP SERPL CALCULATED.3IONS-SCNC: 9 MMOL/L (ref 4–13)
BUN SERPL-MCNC: 16 MG/DL (ref 5–25)
CALCIUM SERPL-MCNC: 8.6 MG/DL (ref 8.3–10.1)
CHLORIDE SERPL-SCNC: 107 MMOL/L (ref 100–108)
CO2 SERPL-SCNC: 23 MMOL/L (ref 21–32)
CREAT SERPL-MCNC: 0.78 MG/DL (ref 0.6–1.3)
ERYTHROCYTE [DISTWIDTH] IN BLOOD BY AUTOMATED COUNT: 14.7 % (ref 11.6–15.1)
GFR SERPL CREATININE-BSD FRML MDRD: 76 ML/MIN/1.73SQ M
GLUCOSE SERPL-MCNC: 103 MG/DL (ref 65–140)
GLUCOSE SERPL-MCNC: 105 MG/DL (ref 65–140)
GLUCOSE SERPL-MCNC: 123 MG/DL (ref 65–140)
HCT VFR BLD AUTO: 40.5 % (ref 34.8–46.1)
HGB BLD-MCNC: 12.8 G/DL (ref 11.5–15.4)
MCH RBC QN AUTO: 28.3 PG (ref 26.8–34.3)
MCHC RBC AUTO-ENTMCNC: 31.6 G/DL (ref 31.4–37.4)
MCV RBC AUTO: 90 FL (ref 82–98)
PLATELET # BLD AUTO: 253 THOUSANDS/UL (ref 149–390)
PMV BLD AUTO: 10.7 FL (ref 8.9–12.7)
POTASSIUM SERPL-SCNC: 4.5 MMOL/L (ref 3.5–5.3)
RBC # BLD AUTO: 4.52 MILLION/UL (ref 3.81–5.12)
SODIUM SERPL-SCNC: 139 MMOL/L (ref 136–145)
WBC # BLD AUTO: 10.26 THOUSAND/UL (ref 4.31–10.16)

## 2020-07-09 PROCEDURE — 99253 IP/OBS CNSLTJ NEW/EST LOW 45: CPT | Performed by: PHYSICIAN ASSISTANT

## 2020-07-09 PROCEDURE — 74240 X-RAY XM UPR GI TRC 1CNTRST: CPT

## 2020-07-09 PROCEDURE — 82948 REAGENT STRIP/BLOOD GLUCOSE: CPT

## 2020-07-09 PROCEDURE — 99024 POSTOP FOLLOW-UP VISIT: CPT | Performed by: SURGERY

## 2020-07-09 PROCEDURE — 80048 BASIC METABOLIC PNL TOTAL CA: CPT | Performed by: SURGERY

## 2020-07-09 PROCEDURE — 85027 COMPLETE CBC AUTOMATED: CPT | Performed by: SURGERY

## 2020-07-09 RX ORDER — DIPHENHYDRAMINE HCL 25 MG
25 TABLET ORAL EVERY 6 HOURS PRN
Status: DISCONTINUED | OUTPATIENT
Start: 2020-07-09 | End: 2020-07-09 | Stop reason: HOSPADM

## 2020-07-09 RX ADMIN — ONDANSETRON 4 MG: 2 INJECTION INTRAMUSCULAR; INTRAVENOUS at 06:09

## 2020-07-09 RX ADMIN — SODIUM CHLORIDE, SODIUM LACTATE, POTASSIUM CHLORIDE, AND CALCIUM CHLORIDE 100 ML/HR: .6; .31; .03; .02 INJECTION, SOLUTION INTRAVENOUS at 02:45

## 2020-07-09 RX ADMIN — ONDANSETRON 4 MG: 2 INJECTION INTRAMUSCULAR; INTRAVENOUS at 12:14

## 2020-07-09 RX ADMIN — METOCLOPRAMIDE 10 MG: 5 INJECTION, SOLUTION INTRAMUSCULAR; INTRAVENOUS at 07:41

## 2020-07-09 RX ADMIN — MORPHINE SULFATE 2 MG: 2 INJECTION, SOLUTION INTRAMUSCULAR; INTRAVENOUS at 07:41

## 2020-07-09 RX ADMIN — IOHEXOL 100 ML: 350 INJECTION, SOLUTION INTRAVENOUS at 11:55

## 2020-07-09 RX ADMIN — PRAVASTATIN SODIUM 40 MG: 40 TABLET ORAL at 15:55

## 2020-07-09 RX ADMIN — FAMOTIDINE 20 MG: 10 INJECTION INTRAVENOUS at 08:50

## 2020-07-09 RX ADMIN — ACETAMINOPHEN 975 MG: 325 SUSPENSION ORAL at 15:55

## 2020-07-09 NOTE — ASSESSMENT & PLAN NOTE
BP slightly elevated this morning  Continue p r n   Hydralazine injections while NPO  Home regimen Vasotec 2 5 mg q p m , can be restarted once cleared by bariatric surgery  Monitor BP while inpatient

## 2020-07-09 NOTE — DISCHARGE SUMMARY
Discharge Summary - Juany Kaiser 70 y o  female MRN: [id number]    Unit/Bed#: E5 -01 Encounter: [id number]      Pre-Operative Diagnosis: Pre-Op Diagnosis Codes:     * Achalasia [K22 0]     * Gastroesophageal reflux disease [K21 9]     * Esophageal stricture [K22 2]     * Diabetes mellitus type 2 in obese ([facility] [address] ) [E11 69, E66 9]    Post-Operative Diagnosis: Post-Op Diagnosis Codes:     * Achalasia [K22 0]     * Gastroesophageal reflux disease [K21 9]     * Esophageal stricture [K22 2]     * Diabetes mellitus type 2 in obese ([facility] [address] ) [E11 69, E66 9]    Procedures Performed:  Procedure(s):  LAPAROSCOPIC HELLER MYOTOMY WITH TOUPET FUNDOPLICATION  W ROBOTICS    Surgeon: Gracy Moody MD    See H & P for full details of admission and Operative Note for full details of operations performed  Patient tolerated surgery well without complications  On the morning of postoperative Day 1 the patient had no nausea  After an unremarkable UGI she was started on a clear liquid diet which was tolerated well  She was discharged that afternoon  She was able to ambulate and void independently  The patient was deemed ready for discharge home  Patient was seen and examined prior to discharge  Provisions for Follow-Up Care:  See After Visit Summary/Discharge Instructions for information related to follow-up care and home orders  Disposition: Home, in stable condition  Planned Readmission: No    Discharge Medications:  See After Visit Summary/Discharge Instructions for reconciled discharge medications provided to patient and family  Post Operative instructions: Reviewed with patient and/or family      Signature:   Miguel Knowles MD  Date: 7/9/2020 Time: 5:52 PM

## 2020-07-09 NOTE — CONSULTS
[facility] Internal Medicine  Consult- Marquita Islas 1945, 70 y o  female MRN: [id number]    Unit/Bed#: E5 -01 Encounter: [id number]    Primary Care Provider: Jeremy Mckenzie MD   Date and time admitted to hospital: 7/8/2020 11:35 AM      Inpatient consult to Internal Medicine  Consult performed by: Boby Tejada PA-C  Consult ordered by: Emily Gross MD          Achalasia  Assessment & Plan  Patient is postop day 1 from laparoscopic Heller myotomy with Toupet fundoplication with robotics with Dr Martina Torres  Scheduled for upper GI today  Diet and pain control per bariatric surgery    * Benign essential HTN  Assessment & Plan  BP slightly elevated this morning  Continue p r n  Hydralazine injections while NPO  Home regimen Vasotec 2 5 mg q p m , can be restarted once cleared by bariatric surgery  Monitor BP while inpatient    Hyperlipidemia  Assessment & Plan  Continue daily statin    Hypothyroidism  Assessment & Plan  Continue daily levothyroxine    Diabetes mellitus type 2, controlled [facility])  Assessment & Plan  Lab Results   Component Value Date    HGBA1C 7 2 (H) 02/11/2020       Recent Labs     07/08/20  1601 07/08/20  1801 07/09/20  0636 07/09/20  1050   POCGLU 175* 178* 103 105       Blood Sugar Average: Last 72 hrs:  (P) 133   Continue sliding scale insulin coverage with Accu-Cheks while inpatient  Continue close glucose monitoring while inpatient        VTE Prophylaxis: Heparin  / sequential compression device     Recommendations for Discharge:  · Continue PCP follow-up for hypertension and diabetes  · Resume enalapril at discharge    Counseling / Coordination of Care Time: 45 minutes  Greater than 50% of total time spent on patient counseling and coordination of care  Collaboration of Care:  Were Recommendations Directly Discussed with Primary Treatment Team? - No     History of Present Illness:    Marquita Islas is a 70 y o  female who is originally admitted to the bariatric surgery service due to achalasia  We are consulted for medical management  Patient seen examined postop day 1, notes she is feeling well  Does note some mild nausea which was completely relieved with IV Reglan  Patient did note she was woken with some right-sided shoulder pain this morning which was improved with IV morphine  Patient denies any associated shortness of breath  Patient denies any palpitations  Patient reports she follows outpatient with her PCP for diabetes, hypertension and hyperlipidemia management  Patient reports appropriate control of her chronic conditions with her home medications  Review of Systems:    Review of Systems   Constitutional: Negative for chills and fever  HENT: Negative for trouble swallowing  Eyes: Negative for visual disturbance  Respiratory: Negative for cough and shortness of breath  Cardiovascular: Negative for palpitations and leg swelling  Gastrointestinal: Positive for nausea  Negative for abdominal pain and vomiting  Genitourinary: Negative for difficulty urinating  Musculoskeletal: Negative for gait problem  Skin: Negative for rash  Neurological: Negative for dizziness and weakness  Psychiatric/Behavioral: Negative for confusion         Past Medical and Surgical History:     Past Medical History:   Diagnosis Date    Arthritis     Colon polyp     Diabetes ([facility] )     Endometrial ca ([facility] )     GERD (gastroesophageal reflux disease)     Hard of hearing     has hearing aids but doesnt wear them    Hiatal hernia     History of total right knee replacement     Hyperlipidemia     Hypertension     Muscle weakness     Obesity     Refusal of blood product     Risk for falls     Stress incontinence     Swelling of both lower extremities     Thyroid disease     hypo    Tinnitus     Use of cane as ambulatory aid     Wears glasses        Past Surgical History:   Procedure Laterality Date    CATARACT EXTRACTION Bilateral      SECTION      x2    COLONOSCOPY  02/15/2016    small polyp, repeat 5yrs    EGD      HYSTERECTOMY      age 48    JOINT REPLACEMENT Right     knee    OOPHORECTOMY      both removed age 48    [address] Right 11/26/2019    Procedure: ARTHROPLASTY KNEE TOTAL;  Surgeon: Mika Archuleta DO;  Location: [facility];  Service: Orthopedics    UPPER GASTROINTESTINAL ENDOSCOPY      WISDOM TOOTH EXTRACTION         Meds/Allergies:    PTA meds:   Prior to Admission Medications   Prescriptions Last Dose Informant Patient Reported? Taking?    Dulaglutide (Trulicity) 1 5 WE/1 7GY SOPN 7/7/2020  Yes Yes   Sig: Inject 1 5 mg under the skin once a week Usually takes Sat but forgot and plans to take 7/7   JARDIANCE 25 MG TABS 7/7/2020 at 0900 Self Yes Yes   Sig: Take 25 mg by mouth daily   VITAMIN D PO 7/4/2020  Yes Yes   Sig: Take 50,000 Units by mouth every 14 (fourteen) days   acetaminophen (TYLENOL) 650 mg CR tablet 5/8/2020 Self No Yes   Sig: Take 1 tablet (650 mg total) by mouth every 8 (eight) hours as needed for mild pain   ascorbic acid (VITAMIN C) 500 mg tablet 7/1/2020 Self No Yes   Sig: TAKE 1 TABLET (500 MG TOTAL) BY MOUTH DAILY   Patient taking differently: Take 500 mg by mouth daily as needed Pt reports occas takes   aspirin (ECOTRIN) 325 mg EC tablet 5/8/2020 Self No No   Sig: TAKE 1 TABLET (325 MG TOTAL) BY MOUTH 2 (TWO) TIMES A DAY START WHEN FINISHED WITH LOVENOX   Patient not taking: Reported on 7/1/2020   aspirin (ECOTRIN) 325 mg EC tablet  Self No No   Sig: TAKE ONE TABLET (325 MG TOTAL) BY MOUTH 2 (TWO) TIMES A DAY   Patient not taking: Reported on 7/1/2020   enalapril (VASOTEC) 2 5 mg tablet 7/7/2020 at 1800 Self Yes Yes   Sig: Take 2 5 mg by mouth every evening    fluconazole (DIFLUCAN) 150 mg tablet 6/24/2020 Self Yes Yes   Sig: TAKE 1 TABLET ONCE  MAY REPEAT IN ONE WEEK IF SYMPTOMS PERSIST    glimepiride (AMARYL) 2 mg tablet 7/7/2020 at 1800 Self No Yes   Sig: Take 1 tablet (2 mg total) by mouth daily with dinner   Patient taking differently: Take 1 mg by mouth daily with dinner    levothyroxine 50 mcg tablet 2020 at 0900 Self Yes Yes   Sig: Take 50 mcg by mouth daily    levothyroxine 75 mcg tablet 2020 Self Yes Yes   Sig: Take 75 mcg by mouth daily   lovastatin (MEVACOR) 40 MG tablet 2020 at 2100 Self Yes Yes   Sig: Take 40 mg by mouth daily at bedtime   omeprazole (PriLOSEC) 20 mg delayed release capsule 2020 at 0900 Self No Yes   Sig: Take 1 capsule (20 mg total) by mouth daily   oxyCODONE (ROXICODONE) 5 mg immediate release tablet   No No   Sig: Take 1 tablet (5 mg total) by mouth every 4 (four) hours as needed for moderate painMax Daily Amount: 30 mg      Facility-Administered Medications: None       Allergies: No Known Allergies    Social History:     Marital Status:     Substance Use History:   Social History     Substance and Sexual Activity   Alcohol Use Never    Frequency: Never     Social History     Tobacco Use   Smoking Status Former Smoker    Last attempt to quit: 10/28/1971    Years since quittin 7   Smokeless Tobacco Never Used     Social History     Substance and Sexual Activity   Drug Use No       Family History:    Family History   Problem Relation Age of Onset    Diabetes Father     Hypertension Father     Breast cancer Mother [de-identified]    Breast cancer Sister 46    Endometrial cancer Daughter 52       Physical Exam:     Vitals:   Blood Pressure: 152/67 (20)  Pulse: 56 (20)  Temperature: 97 7 °F (36 5 °C) (20)  Temp Source: Temporal (20)  Respirations: 18 (20)  Height: 4' 11" (149 9 cm) (20 120)  Weight - Scale: 83 9 kg (185 lb) (20 120)  SpO2: 96 % (20)    Physical Exam   Constitutional: She is oriented to person, place, and time  She appears well-nourished  No distress  HENT:   Head: Normocephalic and atraumatic  Eyes: Conjunctivae are normal  No scleral icterus  Neck: Neck supple  Cardiovascular: Normal rate, regular rhythm and normal heart sounds  No murmur heard  Pulmonary/Chest: Effort normal and breath sounds normal  No respiratory distress  She has no wheezes  She has no rales  Abdominal: Soft  Bowel sounds are normal  She exhibits no distension  There is no tenderness  Musculoskeletal: She exhibits no edema  Neurological: She is alert and oriented to person, place, and time  Skin: Skin is warm and dry  Psychiatric: She has a normal mood and affect  Her behavior is normal  Thought content normal    Vitals reviewed  Additional Data:     Lab Results: I have personally reviewed pertinent reports  Results from last 7 days   Lab Units 07/09/20  0452 07/08/20  1204   WBC Thousand/uL 10 26* 6 28   HEMOGLOBIN g/dL 12 8 14 0   HEMATOCRIT % 40 5 43 7   PLATELETS Thousands/uL 253 275   NEUTROS PCT %  --  49   LYMPHS PCT %  --  37   MONOS PCT %  --  10   EOS PCT %  --  2     Results from last 7 days   Lab Units 07/09/20  0452   SODIUM mmol/L 139   POTASSIUM mmol/L 4 5   CHLORIDE mmol/L 107   CO2 mmol/L 23   BUN mg/dL 16   CREATININE mg/dL 0 78   ANION GAP mmol/L 9   CALCIUM mg/dL 8 6   GLUCOSE RANDOM mg/dL 123             Lab Results   Component Value Date/Time    HGBA1C 7 2 (H) 02/11/2020 10:42 AM    HGBA1C 7 0 (H) 11/06/2019 10:16 AM    HGBA1C 7 0 (H) 10/28/2019 11:45 AM     Results from last 7 days   Lab Units 07/09/20  1050 07/09/20  0636 07/08/20  1801 07/08/20  1601 07/08/20  1216   POC GLUCOSE mg/dl 105 103 178* 175* 104           Imaging: I have personally reviewed pertinent reports  FL upper GI UGI    (Results Pending)       EKG, Pathology, and Other Studies Reviewed on Admission:   · EKG:  Sinus bradycardia    ** Please Note: This note has been constructed using a voice recognition system   **

## 2020-07-09 NOTE — ASSESSMENT & PLAN NOTE
Patient is postop day 1 from laparoscopic Heller myotomy with Toupet fundoplication with robotics with Dr Magdalena Duran  Scheduled for upper GI today  Diet and pain control per bariatric surgery

## 2020-07-09 NOTE — UTILIZATION REVIEW
Initial Clinical Review    Elective    IP     surgical procedure    Age/Sex: 70 y o  female     Surgery Date:   7/8/20    Procedure: LAPAROSCOPIC HELLER MYOTOMY WITH TOUPET FUNDOPLICATION  W ROBOTICS     Anesthesia:    general    Operative Findings: Paraesophageal hernia       POD#1 Progress Note:   7/9  Continue  Post op care  Control  Pain/antiemetics as needed  Continue  IVF,  NPO for now       Admission Orders: Date/Time/Statement: Admission Orders (From admission, onward)     Ordered        07/08/20 1550  Inpatient Admission  Once                   Orders Placed This Encounter   Procedures    Inpatient Admission     Standing Status:   Standing     Number of Occurrences:   1     Order Specific Question:   Admitting Physician     Answer:   SOILA Benjamin [[id number]]     Order Specific Question:   Level of Care     Answer:   Med Surg [16]     Order Specific Question:   Bed Type     Answer:   Bariatric [1]     Order Specific Question:   Estimated length of stay     Answer:   Inpatient Only Surgery     Vital Signs: /67 (BP Location: Left arm)   Pulse 56   Temp 97 7 °F (36 5 °C) (Temporal)   Resp 18   Ht 4' 11" (1 499 m)   Wt 83 9 kg (185 lb)   SpO2 96%   BMI 37 37 kg/m²      Diet:   NPO    Mobility:   OOB  As  tolerated    DVT Prophylaxis:   SCD'S    Medications/Pain Control:   Scheduled Medications:    Medications:  acetaminophen 975 mg Oral Q8H   Or      acetaminophen 975 mg Oral Q8H   famotidine 20 mg Intravenous BID   insulin lispro 1-6 Units Subcutaneous TID AC   levothyroxine 50 mcg Oral Once per day on Mon Tue Wed Thu Fri   [START ON 7/11/2020] levothyroxine 75 mcg Oral Once per day on Sun Sat   ondansetron 4 mg Intravenous Q6H [facility]   pravastatin 40 mg Oral Daily With Dinner   scopolamine 1 patch Transdermal Q72H     Continuous IV Infusions:    lactated ringers 100 mL/hr Intravenous Continuous     PRN Meds:    diphenhydrAMINE 25 mg Intravenous Q6H PRN   hydrALAZINE 5 mg Intravenous Q6H PRN HYDROmorphone 0 5 mg Intravenous Q4H PRN   metoclopramide 10 mg Intravenous Q6H PRN   X1  7/9 thus far   morphine injection 4 mg Intravenous Q2H PRN   morphine injection 2 mg Intravenous Q2H PRN   X1  7/8  And  X1  7/9  Thus far   phenol 2 spray Mouth/Throat Q2H PRN   promethazine 12 5 mg Intravenous Q6H PRN   simethicone 80 mg Oral 4x Daily PRN         Network Utilization Review Department  [email or web address]  ATTENTION: Please call with any questions or concerns to [phone] and carefully listen to the prompts so that you are directed to the right person  All voicemails are confidential   [facility] all requests for admission clinical reviews, approved or denied determinations and any other requests to dedicated fax number below belonging to the campus where the patient is receiving treatment   List of dedicated fax numbers for the Facilities:  [address] DENIALS (Administrative/Medical Necessity) [phone]   [address] (Maternity/NICU/Pediatrics) [phone]   Bailee Ceja [phone]   [place] [phone]   Jorge A Vang [phone]   [place] [phone]   [address] [facility] [address] [facility] [phone]   [facility]  [phone]   [address] [facility] [address]  [address] [address] [address] [facility] [phone]

## 2020-07-09 NOTE — ASSESSMENT & PLAN NOTE
Lab Results   Component Value Date    HGBA1C 7 2 (H) 02/11/2020       Recent Labs     07/08/20  1601 07/08/20  1801 07/09/20  0636 07/09/20  1050   POCGLU 175* 178* 103 105       Blood Sugar Average: Last 72 hrs:  (P) 133   Continue sliding scale insulin coverage with Accu-Cheks while inpatient  Continue close glucose monitoring while inpatient

## 2020-07-09 NOTE — DISCHARGE INSTRUCTIONS
Bariatric/Weight Loss Surgery  Hospital Discharge Instructions  1  ACTIVITY:  a  Progress as feels comfortable - a good rule is:  if you are doing something and it begins to hurt, stop doing the activity  Walk every hour while at home  b  Michaelsukumar Brown may walk stairs if you do so slowly  c  You may shower 48 hours after surgery  d  Use your incentive spirometer 10 times per hour while awake for 1 week  e  Do NOT drive for 48 hours after surgery  No driving 24 hours after taking certain prescription pain medications   Examples of such medication are Percocet, Darvocet, Oxycodone, Tylenol #3, and Tylenol with Codeine  Follow your pharmacists orders  2  DIET  a  Stay on a liquid diet for 7 days after your surgery date, sipping slowly  Refer to your manual for examples of choices  Make sure to drink 48 to 64 ounces per day of fluids  b  Michael Brown may advance to a pureed diet one week after surgery as instructed by your diet progression pamphlet  Once you get approval from your surgeon at your first post operative visit you may advance to the soft diet  3  MEDICATIONS:  a  The abdominal nerve block will wear off during the first 1-2 days that you are home, and you may become sore  Continue to take your Tylenol and your pain medication as instructed  b  Anti-acid medication as per prescription  c  Other medications as indicated on the Physician Patient Discharge Instructions form given to you at the time of discharge  d  Make sure that you are splitting your pill or tablet medications in halves or fourths or even crushing them before you take them  Capsules should be opened and mixed with water or jello  You need to do this for at least 4 weeks after surgery  Eventually you will be able to take your medications the regular way as they were prescribed  e  Michael Brown will need to consult with your Family Doctor in regards to all your prescribed medication, particularly those for blood pressure and diabetes    As you lose weight, medical conditions may change, requiring an alteration or elimination of the drug dose  4  INCISION CARE  a  You may shower and get incisions wet 2 days after surgery  No soaking tub baths or swimming for 30 days after surgery  Keep abdominal area and incisions clean  Use soap and water to create a good lather and rinse off  Do not scrub incisions  5  FOLLOW-UP APPOINTMENT should be made for one week after discharge  Call surgeons office at [phone] to schedule an appointment      6  CALL YOUR DOCTOR FOR:  pain not controlled by pain medications, a temperature greater than 101 5° F, any increase or change in drainage or redness from any incision, any vomiting or inability to keep liquids down, shortness of breath, shoulder pain, or bleeding

## 2020-07-10 ENCOUNTER — TELEPHONE (OUTPATIENT)
Dept: MEDSURG UNIT | Facility: HOSPITAL | Age: 75
End: 2020-07-10

## 2020-07-13 ENCOUNTER — TELEPHONE (OUTPATIENT)
Dept: MEDSURG UNIT | Facility: HOSPITAL | Age: 75
End: 2020-07-13

## 2020-07-15 NOTE — INTERVAL H&P NOTE
H&P reviewed  After examining the patient I find no changes in the patients condition since the H&P had been written      Vitals:    07/09/20 1544   BP: 139/63   Pulse: 65   Resp: 18   Temp: 97 9 °F (36 6 °C)   SpO2: 96%

## 2020-07-23 ENCOUNTER — OFFICE VISIT (OUTPATIENT)
Dept: BARIATRICS | Facility: CLINIC | Age: 75
End: 2020-07-23

## 2020-07-23 VITALS
BODY MASS INDEX: 36.59 KG/M2 | HEART RATE: 76 BPM | RESPIRATION RATE: 16 BRPM | TEMPERATURE: 98.2 F | HEIGHT: 59 IN | SYSTOLIC BLOOD PRESSURE: 102 MMHG | DIASTOLIC BLOOD PRESSURE: 64 MMHG | WEIGHT: 181.5 LBS

## 2020-07-23 DIAGNOSIS — K21.9 GERD (GASTROESOPHAGEAL REFLUX DISEASE): Primary | ICD-10-CM

## 2020-07-23 DIAGNOSIS — K21.9 GASTROESOPHAGEAL REFLUX DISEASE WITHOUT ESOPHAGITIS: ICD-10-CM

## 2020-07-23 DIAGNOSIS — K21.9 GASTROESOPHAGEAL REFLUX DISEASE WITHOUT ESOPHAGITIS: Primary | ICD-10-CM

## 2020-07-23 DIAGNOSIS — K22.0 ACHALASIA: ICD-10-CM

## 2020-07-23 PROCEDURE — 1160F RVW MEDS BY RX/DR IN RCRD: CPT | Performed by: SURGERY

## 2020-07-23 PROCEDURE — 1111F DSCHRG MED/CURRENT MED MERGE: CPT | Performed by: SURGERY

## 2020-07-23 PROCEDURE — 3008F BODY MASS INDEX DOCD: CPT | Performed by: SURGERY

## 2020-07-23 PROCEDURE — RECHECK: Performed by: DIETITIAN, REGISTERED

## 2020-07-23 PROCEDURE — 4040F PNEUMOC VAC/ADMIN/RCVD: CPT | Performed by: SURGERY

## 2020-07-23 PROCEDURE — 3074F SYST BP LT 130 MM HG: CPT | Performed by: SURGERY

## 2020-07-23 PROCEDURE — 99024 POSTOP FOLLOW-UP VISIT: CPT | Performed by: SURGERY

## 2020-07-23 PROCEDURE — 3078F DIAST BP <80 MM HG: CPT | Performed by: SURGERY

## 2020-07-23 RX ORDER — OMEPRAZOLE 20 MG/1
20 CAPSULE, DELAYED RELEASE ORAL 2 TIMES DAILY
Qty: 180 CAPSULE | Refills: 0 | Status: SHIPPED | OUTPATIENT
Start: 2020-07-23 | End: 2021-08-04 | Stop reason: ALTCHOICE

## 2020-07-23 RX ORDER — OMEPRAZOLE 20 MG/1
20 CAPSULE, DELAYED RELEASE ORAL 2 TIMES DAILY
Qty: 180 CAPSULE | Refills: 0 | Status: CANCELLED | OUTPATIENT
Start: 2020-07-23

## 2020-07-23 NOTE — LETTER
July 23, 2020     Carrington Mendez MD  [facility] [place] [facility]    Patient: Prince Genao   YOB: 1945   Date of Visit: 7/23/2020       Dear Dr Jigna Muro:    Thank you for referring Ana Kumar to me for evaluation  Below are my notes for this consultation  If you have questions, please do not hesitate to call me  I look forward to following your patient along with you  Sincerely,        Eric Parisi MD        CC: No Recipients  Chilo Ray MD  7/23/2020  2:24 PM  Sign at close encounter    OFFICE VISIT - BARIATRIC SURGERY  Prince Genao 70 y o  female MRN: [id number]  Unit/Bed#:  Encounter: [id number]      HPI:  Prince Genao is a 70 y o  female status post lap Heller myotomy w/Toupet fundoplication for achalasia by Dr Anel López on 7/8/2020  Comes to the office today for her first postop visit  Subjective      The patient was referred to us by Dr Jigna Muro  Patient reports doing well  Incisional pain is adequately controlled w/only Tylenol  Tolerating full liquid diet, and having frequent liquid bowel movements  Ambulation is not impaired, and PPI(prilosec 20mg qd)  is still being taken  The patient has 100% resolution of her preoperative symptoms  Review of Systems   Constitutional: Negative  HENT: Negative  Eyes: Negative  Respiratory: Negative  Cardiovascular: Negative  Gastrointestinal: Negative  Endocrine: Negative  Genitourinary: Negative  Musculoskeletal: Negative  Skin: Negative  Allergic/Immunologic: Negative  Neurological: Negative  Hematological: Negative  Psychiatric/Behavioral: Negative          Historical Information   Past Medical History:   Diagnosis Date    Arthritis     Colon polyp     Diabetes ([facility] )     Endometrial ca ([facility] ) 2000    GERD (gastroesophageal reflux disease)     Hard of hearing     has hearing aids but doesnt wear them    Hiatal hernia     History of total right knee replacement     Hyperlipidemia     Hypertension     Muscle weakness     Obesity     Refusal of blood product     Risk for falls     Stress incontinence     Swelling of both lower extremities     Thyroid disease     hypo    Tinnitus     Use of cane as ambulatory aid     Wears glasses      Past Surgical History:   Procedure Laterality Date    CATARACT EXTRACTION Bilateral      SECTION      x2    COLONOSCOPY  02/15/2016    small polyp, repeat 5yrs    EGD      HYSTERECTOMY      age 48    JOINT REPLACEMENT Right     knee    OOPHORECTOMY      both removed age 48    IN LAP, ESOPHAGOMYOTOMY W FUNDOPLASTY N/A 2020    Procedure: LAPAROSCOPIC HELLER MYOTOMY WITH TOUPET FUNDOPLICATION  W ROBOTICS;  Surgeon: Senait Suarez MD;  [facility];  Service: General    IN TOTAL KNEE ARTHROPLASTY Right 2019    Procedure: ARTHROPLASTY KNEE TOTAL;  Surgeon: Heidy Green DO;  [facility];  Service: Orthopedics    UPPER GASTROINTESTINAL ENDOSCOPY      WISDOM TOOTH EXTRACTION       Social History   Social History     Substance and Sexual Activity   Alcohol Use Never    Frequency: Never     Social History     Substance and Sexual Activity   Drug Use No     Social History     Tobacco Use   Smoking Status Former Smoker    Last attempt to quit: 10/28/1971    Years since quittin 7   Smokeless Tobacco Never Used       Objective       Current Vitals:        Invasive Devices     None                 Physical Exam   Constitutional: She is oriented to person, place, and time  She appears well-developed and well-nourished  HENT:   Head: Normocephalic and atraumatic  Nose: Nose normal    Eyes: Conjunctivae and EOM are normal    Neck: Normal range of motion  Cardiovascular: Normal rate  Pulmonary/Chest: Effort normal    Abdominal: Soft  Bowel sounds are normal  She exhibits no distension and no mass  There is no tenderness  There is no rebound and no guarding  No hernia     All laparoscopic incisions are healing well, with no signs of infection  There are no palpable incisional hernias  Deep palpation is not painful in all 4 quadrants, and there are no peritoneal findings  Musculoskeletal: Normal range of motion  Neurological: She is alert and oriented to person, place, and time  Skin: Skin is warm  Psychiatric: She has a normal mood and affect  Her behavior is normal  Judgment and thought content normal      Pathology, and Other Studies: I have personally reviewed pertinent films in PACS      Assessment/PLAN:    Lowell Veras is a 70 y o  female status post lap Heller myotomy w/Toupet fundoplication for achalasia by Dr Florentino Favre on 7/8/2020  Comes to the office today for her first postop visit  Doing well post op  No major issues  Pathology reviewed and discussed with patient  Negative for abnormal findings  The patient may now increase physical activity as tolerated with no heavy lifting for an additional 4 weeks  Diet will be advanced today as instructed by our dietitians and the patient binder  Follow up in 3 months                Ruthy Dean MD  Bariatric Surgery Fellow  7/23/2020  2:16 PM

## 2020-07-23 NOTE — PROGRESS NOTES
Weight Management Nutrition Class     Diagnosis: GERD    Bariatric Surgeon: Dr Tay Novak    Surgery: heller myotomy with fundoplication    Class: first post op note    Topics discussed today include:     fluid goals post op, protein goals post op, chew food well, diet progression and protein supplems    Patient was able to verbalize basic diet (protein, fluid, vitamin and mineral) recommendations and possible nutrition-related complications   Yes

## 2020-07-23 NOTE — PROGRESS NOTES
OFFICE VISIT - BARIATRIC SURGERY  Efrain Wallace 70 y o  female MRN: [id number]  Unit/Bed#:  Encounter: [id number]      HPI:  Efrain Wallace is a 70 y o  female status post lap Heller myotomy w/Toupet fundoplication for achalasia by Dr Antonia Hollis on 2020  Comes to the office today for her first postop visit  Subjective      The patient was referred to us by Dr Harris   Patient reports doing well  Incisional pain is adequately controlled w/only Tylenol  Tolerating full liquid diet, and having frequent liquid bowel movements  Ambulation is not impaired, and PPI(prilosec 20mg qd)  is still being taken  The patient has 100% resolution of her preoperative symptoms  Review of Systems   Constitutional: Negative  HENT: Negative  Eyes: Negative  Respiratory: Negative  Cardiovascular: Negative  Gastrointestinal: Negative  Endocrine: Negative  Genitourinary: Negative  Musculoskeletal: Negative  Skin: Negative  Allergic/Immunologic: Negative  Neurological: Negative  Hematological: Negative  Psychiatric/Behavioral: Negative          Historical Information   Past Medical History:   Diagnosis Date    Arthritis     Colon polyp     [facility]     [facility] ca [facility]     GERD (gastroesophageal reflux disease)     Hard of hearing     has hearing aids but doesnt wear them    Hiatal hernia     History of total right knee replacement     Hyperlipidemia     Hypertension     Muscle weakness     Obesity     Refusal of blood product     Risk for falls     Stress incontinence     Swelling of both lower extremities     Thyroid disease     hypo    Tinnitus     Use of cane as ambulatory aid     Wears glasses      Past Surgical History:   Procedure Laterality Date    CATARACT EXTRACTION Bilateral      SECTION      x2    COLONOSCOPY  02/15/2016    small polyp, repeat 5yrs    EGD      HYSTERECTOMY      age 48    JOINT REPLACEMENT Right     knee    OOPHORECTOMY      both removed age 48    [place] LAP, ESOPHAGOMYOTOMY W FUNDOPLASTY N/A 2020    Procedure: LAPAROSCOPIC HELLER MYOTOMY WITH TOUPET FUNDOPLICATION  W ROBOTICS;  Surgeon: Brown Kumar MD;  [facility];  Service: General    NC TOTAL KNEE ARTHROPLASTY Right 2019    Procedure: ARTHROPLASTY KNEE TOTAL;  Surgeon: Jaja Irene DO;  [facility];  Service: Orthopedics    UPPER GASTROINTESTINAL ENDOSCOPY      WISDOM TOOTH EXTRACTION       Social History   Social History     Substance and Sexual Activity   Alcohol Use Never    Frequency: Never     Social History     Substance and Sexual Activity   Drug Use No     Social History     Tobacco Use   Smoking Status Former Smoker    Last attempt to quit: 10/28/1971    Years since quittin 7   Smokeless Tobacco Never Used       Objective       Current Vitals:        Invasive Devices     None                 Physical Exam   Constitutional: She is oriented to person, place, and time  She appears well-developed and well-nourished  HENT:   Head: Normocephalic and atraumatic  Nose: Nose normal    Eyes: Conjunctivae and EOM are normal    Neck: Normal range of motion  Cardiovascular: Normal rate  Pulmonary/Chest: Effort normal    Abdominal: Soft  Bowel sounds are normal  She exhibits no distension and no mass  There is no tenderness  There is no rebound and no guarding  No hernia  All laparoscopic incisions are healing well, with no signs of infection  There are no palpable incisional hernias  Deep palpation is not painful in all 4 quadrants, and there are no peritoneal findings  Musculoskeletal: Normal range of motion  Neurological: She is alert and oriented to person, place, and time  Skin: Skin is warm  Psychiatric: She has a normal mood and affect   Her behavior is normal  Judgment and thought content normal      Pathology, and Other Studies: I have personally reviewed pertinent films in PACS      Assessment/PLAN:    Marquita Islas is a 70 y o  female status post lap Heller myotomy w/Toupet fundoplication for achalasia by Dr Martina Torres on 7/8/2020  Comes to the office today for her first postop visit  Doing well post op  No major issues  Pathology reviewed and discussed with patient  Negative for abnormal findings  The patient may now increase physical activity as tolerated with no heavy lifting for an additional 4 weeks  Diet will be advanced today as instructed by our dietitians and the patient binder  Follow up in 3 months                Abdifatah Boyd MD  Bariatric Surgery Fellow  7/23/2020  2:16 PM

## 2020-07-30 ENCOUNTER — APPOINTMENT (OUTPATIENT)
Dept: LAB | Facility: IMAGING CENTER | Age: 75
End: 2020-07-30
Payer: COMMERCIAL

## 2020-07-30 ENCOUNTER — TRANSCRIBE ORDERS (OUTPATIENT)
Dept: ADMINISTRATIVE | Facility: HOSPITAL | Age: 75
End: 2020-07-30

## 2020-07-30 DIAGNOSIS — E55.9 VITAMIN D DEFICIENCY, UNSPECIFIED: ICD-10-CM

## 2020-07-30 DIAGNOSIS — E11.9 TYPE 2 DIABETES MELLITUS WITHOUT COMPLICATION, WITHOUT LONG-TERM CURRENT USE OF INSULIN (HCC): ICD-10-CM

## 2020-07-30 DIAGNOSIS — I51.9 MYXEDEMA HEART DISEASE: ICD-10-CM

## 2020-07-30 DIAGNOSIS — E03.9 MYXEDEMA HEART DISEASE: ICD-10-CM

## 2020-07-30 DIAGNOSIS — E11.9 TYPE 2 DIABETES MELLITUS WITHOUT COMPLICATION, WITHOUT LONG-TERM CURRENT USE OF INSULIN (HCC): Primary | ICD-10-CM

## 2020-07-30 LAB
25(OH)D3 SERPL-MCNC: 70.9 NG/ML (ref 30–100)
ALBUMIN SERPL BCP-MCNC: 3.5 G/DL (ref 3.5–5)
ALP SERPL-CCNC: 117 U/L (ref 46–116)
ALT SERPL W P-5'-P-CCNC: 17 U/L (ref 12–78)
ANION GAP SERPL CALCULATED.3IONS-SCNC: 5 MMOL/L (ref 4–13)
AST SERPL W P-5'-P-CCNC: 8 U/L (ref 5–45)
BASOPHILS # BLD AUTO: 0.04 THOUSANDS/ΜL (ref 0–0.1)
BASOPHILS NFR BLD AUTO: 1 % (ref 0–1)
BILIRUB SERPL-MCNC: 1 MG/DL (ref 0.2–1)
BUN SERPL-MCNC: 20 MG/DL (ref 5–25)
CALCIUM SERPL-MCNC: 9.7 MG/DL (ref 8.3–10.1)
CHLORIDE SERPL-SCNC: 109 MMOL/L (ref 100–108)
CHOLEST SERPL-MCNC: 164 MG/DL (ref 50–200)
CO2 SERPL-SCNC: 28 MMOL/L (ref 21–32)
CREAT SERPL-MCNC: 0.77 MG/DL (ref 0.6–1.3)
CREAT UR-MCNC: 112 MG/DL
EOSINOPHIL # BLD AUTO: 0.19 THOUSAND/ΜL (ref 0–0.61)
EOSINOPHIL NFR BLD AUTO: 3 % (ref 0–6)
ERYTHROCYTE [DISTWIDTH] IN BLOOD BY AUTOMATED COUNT: 14.4 % (ref 11.6–15.1)
EST. AVERAGE GLUCOSE BLD GHB EST-MCNC: 148 MG/DL
GFR SERPL CREATININE-BSD FRML MDRD: 77 ML/MIN/1.73SQ M
GLUCOSE P FAST SERPL-MCNC: 111 MG/DL (ref 65–99)
HBA1C MFR BLD: 6.8 %
HCT VFR BLD AUTO: 44.5 % (ref 34.8–46.1)
HDLC SERPL-MCNC: 48 MG/DL
HGB BLD-MCNC: 14 G/DL (ref 11.5–15.4)
IMM GRANULOCYTES # BLD AUTO: 0.02 THOUSAND/UL (ref 0–0.2)
IMM GRANULOCYTES NFR BLD AUTO: 0 % (ref 0–2)
LDLC SERPL CALC-MCNC: 78 MG/DL (ref 0–100)
LYMPHOCYTES # BLD AUTO: 3 THOUSANDS/ΜL (ref 0.6–4.47)
LYMPHOCYTES NFR BLD AUTO: 45 % (ref 14–44)
MCH RBC QN AUTO: 27.9 PG (ref 26.8–34.3)
MCHC RBC AUTO-ENTMCNC: 31.5 G/DL (ref 31.4–37.4)
MCV RBC AUTO: 89 FL (ref 82–98)
MICROALBUMIN UR-MCNC: 6.6 MG/L (ref 0–20)
MICROALBUMIN/CREAT 24H UR: 6 MG/G CREATININE (ref 0–30)
MONOCYTES # BLD AUTO: 0.53 THOUSAND/ΜL (ref 0.17–1.22)
MONOCYTES NFR BLD AUTO: 8 % (ref 4–12)
NEUTROPHILS # BLD AUTO: 2.88 THOUSANDS/ΜL (ref 1.85–7.62)
NEUTS SEG NFR BLD AUTO: 43 % (ref 43–75)
NONHDLC SERPL-MCNC: 116 MG/DL
NRBC BLD AUTO-RTO: 0 /100 WBCS
PLATELET # BLD AUTO: 365 THOUSANDS/UL (ref 149–390)
PMV BLD AUTO: 10.8 FL (ref 8.9–12.7)
POTASSIUM SERPL-SCNC: 4.5 MMOL/L (ref 3.5–5.3)
PROT SERPL-MCNC: 6.9 G/DL (ref 6.4–8.2)
RBC # BLD AUTO: 5.02 MILLION/UL (ref 3.81–5.12)
SODIUM SERPL-SCNC: 142 MMOL/L (ref 136–145)
T4 FREE SERPL-MCNC: 1.02 NG/DL (ref 0.76–1.46)
TRIGL SERPL-MCNC: 188 MG/DL
TSH SERPL DL<=0.05 MIU/L-ACNC: 2.34 UIU/ML (ref 0.36–3.74)
WBC # BLD AUTO: 6.66 THOUSAND/UL (ref 4.31–10.16)

## 2020-07-30 PROCEDURE — 84443 ASSAY THYROID STIM HORMONE: CPT

## 2020-07-30 PROCEDURE — 3061F NEG MICROALBUMINURIA REV: CPT | Performed by: SURGERY

## 2020-07-30 PROCEDURE — 80061 LIPID PANEL: CPT

## 2020-07-30 PROCEDURE — 82043 UR ALBUMIN QUANTITATIVE: CPT | Performed by: NURSE PRACTITIONER

## 2020-07-30 PROCEDURE — 82570 ASSAY OF URINE CREATININE: CPT | Performed by: NURSE PRACTITIONER

## 2020-07-30 PROCEDURE — 80053 COMPREHEN METABOLIC PANEL: CPT

## 2020-07-30 PROCEDURE — 3044F HG A1C LEVEL LT 7.0%: CPT | Performed by: SURGERY

## 2020-07-30 PROCEDURE — 82306 VITAMIN D 25 HYDROXY: CPT

## 2020-07-30 PROCEDURE — 85025 COMPLETE CBC W/AUTO DIFF WBC: CPT

## 2020-07-30 PROCEDURE — 84439 ASSAY OF FREE THYROXINE: CPT

## 2020-07-30 PROCEDURE — 36415 COLL VENOUS BLD VENIPUNCTURE: CPT

## 2020-07-30 PROCEDURE — 83036 HEMOGLOBIN GLYCOSYLATED A1C: CPT

## 2020-09-15 NOTE — DISCHARGE INSTRUCTIONS
TOTAL KNEE REPLACEMENT DISCHARGE INSTRUCTIONS    Surgical Dressing:  Change your dressing daily until drainage stops  Maintain steri strips  Let them fall off on their own  Do not put any lotions or creams on your incision  If there are any signs of infection such as drainage persisting beyond 7 days, unusual looking drainage (yellow, green), increased redness around the incision, or fever/chills let your doctor know  Do not get your incision wet until approved by your physician  Medications:  Upon discharge you will be given a prescription for an anticoagulant (i e  Ecotrin (Aspirin), Coumadin (Warfarin), Lovenox (Enoxaparin)) and a narcotic pain reliever  Do not take any over the counter NSAIDs (i e  Ibuprofen, Motrin, Advil, Naprosyn, Aleve) while on your anticoagulation medication  Narcotic pain relievers cannot be refilled over the phone  Please be mindful of the number of pills you have left so you can  a prescription for a refill if needed during office hours  If you are on Coumadin (Warfarin) you will need your blood drawn every Monday and Thursday once you are home  Initially your visiting nurse will draw your blood  Later you will go to an outpatient lab  You will receive a phone call the next day and your Coumadin (warfarin) will be dosed based on these results  Take this dosage daily until you hear from us next  Walking:  Use two crutches or a walker for EVERY step  Gradually increase your walking daily  You can progress to a cane as tolerated once advised by your physical therapist   Be sure to work on advancing your range of motion daily  Bathing:  No tub baths  Do not submerge your incision  You may shower, but your incision must stay clean and dry while showering  It may be helpful to use gauze and a Tegaderm dressing to seal off your incision  You may sit on a shower chair or stand and shower briefly    Use your crutches/walker to get in and out of the shower  Once your incision is healed, and it is approved by your physician, you may get your incision wet  Sexual Relations:  Resume according to your comfort  Swimming:  No swimming or hot tubs until approved by your physician  Swimming will be allowed once your incision is well healed  Driving: You may ride as a passenger now  No driving until your follow-up appointment  To get into the car use the front passenger seat with the seat pushed back as far as possible  Scoot yourself back in the seat  Use your hands to assist your legs into the car  Physical therapy:  When you have finished with home visiting PT, you may begin outpatient PT  Call your surgeons office if you have not already received a prescription  A prescription can be faxed to the outpatient center of your choice  Special considerations: To minimize swelling, stiffness, and decrease pain use cold as needed, but not heat  Ice 20 minutes at a time with a cloth between your skin and the ice  Ice after walking, when you have pain, or after you have completed your exercises  Limit your sitting to 60 minutes at one time  Continue to wear your ISAAK stockings at all times for 2 weeks after surgery, except when washing  You can wear them as needed after that  Follow up:  Call [phone] to make an appointment to see your surgeon within 2 weeks of your surgery if you havent done so already  If you have any questions during business hours please call the direct office phone number [phone]  Otherwise please call [phone] for any concerns  For the future:  Prior to any dental work, including routine cleanings, call the office for a prescription for antibiotics to be taken prior to your dental appointment    For any invasive procedures (i e  endoscopy, colonoscopy, etc ) inform the doctor performing the procedure that you have a total knee replacement and will antibiotics just prior to the procedure to protect it  18:50

## 2020-10-15 ENCOUNTER — TELEPHONE (OUTPATIENT)
Dept: OTHER | Facility: OTHER | Age: 75
End: 2020-10-15

## 2020-10-16 DIAGNOSIS — K21.9 GASTROESOPHAGEAL REFLUX DISEASE WITHOUT ESOPHAGITIS: ICD-10-CM

## 2020-11-23 LAB
LEFT EYE DIABETIC RETINOPATHY: NORMAL
RIGHT EYE DIABETIC RETINOPATHY: NORMAL

## 2021-03-25 ENCOUNTER — TELEPHONE (OUTPATIENT)
Dept: FAMILY MEDICINE CLINIC | Facility: CLINIC | Age: 76
End: 2021-03-25

## 2021-03-25 DIAGNOSIS — Z12.31 ENCOUNTER FOR SCREENING MAMMOGRAM FOR BREAST CANCER: Primary | ICD-10-CM

## 2021-03-25 NOTE — TELEPHONE ENCOUNTER
Patient called into office requesting a new order for her Mammogram     Mammogram ordered  Patient reminded of her COVID vaccine appointment and the information to where she needs to be

## 2021-03-26 ENCOUNTER — IMMUNIZATIONS (OUTPATIENT)
Dept: FAMILY MEDICINE CLINIC | Facility: HOSPITAL | Age: 76
End: 2021-03-26

## 2021-03-26 DIAGNOSIS — Z23 ENCOUNTER FOR IMMUNIZATION: Primary | ICD-10-CM

## 2021-03-26 PROCEDURE — 91301 SARS-COV-2 / COVID-19 MRNA VACCINE (MODERNA) 100 MCG: CPT

## 2021-03-26 PROCEDURE — 0011A SARS-COV-2 / COVID-19 MRNA VACCINE (MODERNA) 100 MCG: CPT

## 2021-03-30 ENCOUNTER — TELEPHONE (OUTPATIENT)
Dept: OBGYN CLINIC | Facility: HOSPITAL | Age: 76
End: 2021-03-30

## 2021-03-30 NOTE — TELEPHONE ENCOUNTER
Patient requested an appointment online for her left foot  I called the patient and left a voice mail to call us back if she still needs an appointment  Please schedule when she calls back

## 2021-04-28 ENCOUNTER — IMMUNIZATIONS (OUTPATIENT)
Dept: FAMILY MEDICINE CLINIC | Facility: HOSPITAL | Age: 76
End: 2021-04-28

## 2021-04-28 DIAGNOSIS — Z23 ENCOUNTER FOR IMMUNIZATION: Primary | ICD-10-CM

## 2021-04-28 PROCEDURE — 91301 SARS-COV-2 / COVID-19 MRNA VACCINE (MODERNA) 100 MCG: CPT

## 2021-04-28 PROCEDURE — 0012A SARS-COV-2 / COVID-19 MRNA VACCINE (MODERNA) 100 MCG: CPT

## 2021-05-12 ENCOUNTER — TRANSCRIBE ORDERS (OUTPATIENT)
Dept: ADMINISTRATIVE | Facility: HOSPITAL | Age: 76
End: 2021-05-12

## 2021-05-12 ENCOUNTER — APPOINTMENT (OUTPATIENT)
Dept: LAB | Facility: IMAGING CENTER | Age: 76
End: 2021-05-12
Payer: COMMERCIAL

## 2021-05-12 DIAGNOSIS — E11.9 TYPE 2 DIABETES MELLITUS WITHOUT COMPLICATION, WITHOUT LONG-TERM CURRENT USE OF INSULIN (HCC): Primary | ICD-10-CM

## 2021-05-12 DIAGNOSIS — E11.9 TYPE 2 DIABETES MELLITUS WITHOUT COMPLICATION, WITHOUT LONG-TERM CURRENT USE OF INSULIN (HCC): ICD-10-CM

## 2021-05-12 DIAGNOSIS — Z12.31 ENCOUNTER FOR SCREENING MAMMOGRAM FOR BREAST CANCER: ICD-10-CM

## 2021-05-12 LAB
ALBUMIN SERPL BCP-MCNC: 3.8 G/DL (ref 3.5–5)
ALP SERPL-CCNC: 120 U/L (ref 46–116)
ALT SERPL W P-5'-P-CCNC: 26 U/L (ref 12–78)
ANION GAP SERPL CALCULATED.3IONS-SCNC: 3 MMOL/L (ref 4–13)
AST SERPL W P-5'-P-CCNC: 14 U/L (ref 5–45)
BILIRUB SERPL-MCNC: 0.97 MG/DL (ref 0.2–1)
BUN SERPL-MCNC: 18 MG/DL (ref 5–25)
CALCIUM SERPL-MCNC: 9.8 MG/DL (ref 8.3–10.1)
CHLORIDE SERPL-SCNC: 108 MMOL/L (ref 100–108)
CHOLEST SERPL-MCNC: 180 MG/DL (ref 50–200)
CO2 SERPL-SCNC: 28 MMOL/L (ref 21–32)
CREAT SERPL-MCNC: 0.87 MG/DL (ref 0.6–1.3)
EST. AVERAGE GLUCOSE BLD GHB EST-MCNC: 160 MG/DL
GFR SERPL CREATININE-BSD FRML MDRD: 66 ML/MIN/1.73SQ M
GLUCOSE P FAST SERPL-MCNC: 124 MG/DL (ref 65–99)
HBA1C MFR BLD: 7.2 %
HDLC SERPL-MCNC: 65 MG/DL
LDLC SERPL CALC-MCNC: 89 MG/DL (ref 0–100)
NONHDLC SERPL-MCNC: 115 MG/DL
POTASSIUM SERPL-SCNC: 4.5 MMOL/L (ref 3.5–5.3)
PROT SERPL-MCNC: 7.5 G/DL (ref 6.4–8.2)
SODIUM SERPL-SCNC: 139 MMOL/L (ref 136–145)
TRIGL SERPL-MCNC: 128 MG/DL
TSH SERPL DL<=0.05 MIU/L-ACNC: 2.37 UIU/ML (ref 0.36–3.74)

## 2021-05-12 PROCEDURE — 36415 COLL VENOUS BLD VENIPUNCTURE: CPT

## 2021-05-12 PROCEDURE — 84443 ASSAY THYROID STIM HORMONE: CPT

## 2021-05-12 PROCEDURE — 83036 HEMOGLOBIN GLYCOSYLATED A1C: CPT

## 2021-05-12 PROCEDURE — 80061 LIPID PANEL: CPT

## 2021-05-12 PROCEDURE — 80053 COMPREHEN METABOLIC PANEL: CPT

## 2021-05-18 ENCOUNTER — HOSPITAL ENCOUNTER (OUTPATIENT)
Dept: RADIOLOGY | Facility: IMAGING CENTER | Age: 76
Discharge: HOME | End: 2021-05-18
Payer: COMMERCIAL

## 2021-05-18 VITALS — HEIGHT: 59 IN | WEIGHT: 228 LBS | BODY MASS INDEX: 45.96 KG/M2

## 2021-05-18 DIAGNOSIS — Z12.31 ENCOUNTER FOR SCREENING MAMMOGRAM FOR BREAST CANCER: ICD-10-CM

## 2021-05-18 PROCEDURE — 77067 SCR MAMMO BI INCL CAD: CPT

## 2021-05-28 RX ORDER — OMEPRAZOLE 20 MG/1
20 CAPSULE, DELAYED RELEASE ORAL 2 TIMES DAILY
Qty: 180 CAPSULE | Refills: 0 | OUTPATIENT
Start: 2021-05-28 | End: 2021-08-26

## 2021-07-29 ENCOUNTER — RA CDI HCC (OUTPATIENT)
Dept: OTHER | Facility: HOSPITAL | Age: 76
End: 2021-07-29

## 2021-07-29 NOTE — PROGRESS NOTES
Based on clinical documentation indicated in your record, it appears that the patient may have the following conditions:    E11 65 Type 2 diabetes with hyperglycemia    E66 01 Morbid obesity    If this is correct, please document and assess at your next visit August 5th  [place] [address]  coding opportunities             Chart reviewed, (number of) suggestions sent to provider: 2                  Patients insurance company: [employer] (Medicare Advantage and Commercial)             [place] [address]  coding opportunities             Chart Reviewed * (Number of) Inbasket suggestions sent to Provider: 2                  Patients insurance company: [employer] (Medicare Advantage and Commercial)     Visit status: Patient arrived for their scheduled appointment

## 2021-08-04 RX ORDER — ERGOCALCIFEROL (VITAMIN D2) 1250 MCG
CAPSULE ORAL
COMMUNITY
Start: 2021-05-14

## 2021-08-04 RX ORDER — SEMAGLUTIDE 1.34 MG/ML
INJECTION, SOLUTION SUBCUTANEOUS
COMMUNITY
Start: 2021-05-14 | End: 2021-08-05 | Stop reason: ALTCHOICE

## 2021-08-05 ENCOUNTER — OFFICE VISIT (OUTPATIENT)
Dept: FAMILY MEDICINE CLINIC | Facility: CLINIC | Age: 76
End: 2021-08-05
Payer: COMMERCIAL

## 2021-08-05 VITALS
DIASTOLIC BLOOD PRESSURE: 60 MMHG | TEMPERATURE: 97.9 F | HEART RATE: 72 BPM | OXYGEN SATURATION: 98 % | WEIGHT: 213 LBS | SYSTOLIC BLOOD PRESSURE: 118 MMHG | BODY MASS INDEX: 44.71 KG/M2 | HEIGHT: 58 IN | RESPIRATION RATE: 16 BRPM

## 2021-08-05 DIAGNOSIS — E66.01 MORBID OBESITY WITH BMI OF 40.0-44.9, ADULT (HCC): ICD-10-CM

## 2021-08-05 DIAGNOSIS — Z00.00 MEDICARE ANNUAL WELLNESS VISIT, SUBSEQUENT: Primary | ICD-10-CM

## 2021-08-05 DIAGNOSIS — E11.65 UNCONTROLLED TYPE 2 DIABETES MELLITUS WITH HYPERGLYCEMIA (HCC): ICD-10-CM

## 2021-08-05 DIAGNOSIS — K86.2 PANCREATIC CYST: ICD-10-CM

## 2021-08-05 LAB
CREAT UR-MCNC: 59.8 MG/DL
MICROALBUMIN UR-MCNC: 7.4 MG/L (ref 0–20)
MICROALBUMIN/CREAT 24H UR: 12 MG/G CREATININE (ref 0–30)

## 2021-08-05 PROCEDURE — 99214 OFFICE O/P EST MOD 30 MIN: CPT | Performed by: FAMILY MEDICINE

## 2021-08-05 PROCEDURE — 82570 ASSAY OF URINE CREATININE: CPT | Performed by: FAMILY MEDICINE

## 2021-08-05 PROCEDURE — 3288F FALL RISK ASSESSMENT DOCD: CPT | Performed by: FAMILY MEDICINE

## 2021-08-05 PROCEDURE — 82043 UR ALBUMIN QUANTITATIVE: CPT | Performed by: FAMILY MEDICINE

## 2021-08-05 PROCEDURE — G0439 PPPS, SUBSEQ VISIT: HCPCS | Performed by: FAMILY MEDICINE

## 2021-08-05 RX ORDER — GLIMEPIRIDE 1 MG/1
TABLET ORAL
COMMUNITY
Start: 2021-07-19

## 2021-08-05 RX ORDER — DULAGLUTIDE 1.5 MG/.5ML
1.5 INJECTION, SOLUTION SUBCUTANEOUS WEEKLY
Qty: 2 ML | Refills: 0 | Status: SHIPPED | OUTPATIENT
Start: 2021-08-05 | End: 2022-06-13 | Stop reason: ALTCHOICE

## 2021-08-05 NOTE — PROGRESS NOTES
Assessment and Plan:     Problem List Items Addressed This Visit        Endocrine    Uncontrolled type 2 diabetes mellitus with hyperglycemia ([address] )    Relevant Medications    glimepiride (AMARYL) 1 mg tablet    Dulaglutide (Trulicity) 1 5 LV/0 2FA SOPN    Other Relevant Orders    Microalbumin / creatinine urine ratio (Completed)    Ambulatory referral to Podiatry       Other    Medicare annual wellness visit, subsequent - Primary    Relevant Orders    CBC and differential      Other Visit Diagnoses     Morbid obesity with BMI of 40 0-44 9, adult [facility])        Pancreatic cyst        Relevant Orders    MRI abdomen w wo contrast           Preventive health issues were discussed with patient, and age appropriate screening tests were ordered as noted in patient's After Visit Summary  Personalized health advice and appropriate referrals for health education or preventive services given if needed, as noted in patient's After Visit Summary       History of Present Illness:     Patient presents for Medicare Annual Wellness visit    Patient Care Team:  Dawn Rodriguez MD as PCP - General (Family Medicine)     Problem List:     Patient Active Problem List   Diagnosis    Diabetes mellitus type 2, controlled ([address] )    Hypothyroidism    Bronchitis    Cough    Hypercalcemia    Primary osteoarthritis of right knee    Viral upper respiratory tract infection    Localized edema    Esophageal stricture    Venous (peripheral) insufficiency    Benign essential HTN    Hyperlipidemia    Morbid obesity ([address] )    Acute blood loss anemia    Encounter for support and coordination of transition of care    Gastroesophageal reflux disease without esophagitis    Chronic idiopathic constipation    History of colon polyps    Uncontrolled type 2 diabetes mellitus with hyperglycemia ([address] )    Medicare annual wellness visit, subsequent    Achalasia      Past Medical and Surgical History:     Past Medical History:   Diagnosis Date    Arthritis     Colon polyp     Diabetes ([address] [address] )     Diabetes mellitus ([facility] [address] )     [facility] ca [facility]     GERD (gastroesophageal reflux disease)     Hard of hearing     has hearing aids but doesnt wear them    Hiatal hernia     History of total right knee replacement     Hyperlipidemia     Hypertension     Muscle weakness     Obesity     Refusal of blood product     Risk for falls     Stress incontinence     Swelling of both lower extremities     Thyroid disease     hypo    Tinnitus     Use of cane as ambulatory aid     Wears glasses      Past Surgical History:   Procedure Laterality Date    CATARACT EXTRACTION Bilateral      SECTION      x2    COLONOSCOPY  02/15/2016    small polyp, repeat 5yrs    EGD      EYE SURGERY      HYSTERECTOMY      age 48    JOINT REPLACEMENT Right     knee    OOPHORECTOMY      both removed age 48    NE LAP, ESOPHAGOMYOTOMY W FUNDOPLASTY N/A 2020    Procedure: LAPAROSCOPIC HELLER MYOTOMY WITH TOUPET FUNDOPLICATION  W ROBOTICS;  Surgeon: Jessika Rodriguez MD;  Location: [facility];  Service: General    NE TOTAL KNEE ARTHROPLASTY Right 2019    Procedure: ARTHROPLASTY KNEE TOTAL;  Surgeon: Kevin Browning DO;  Location[facility];  Service: Orthopedics    UPPER GASTROINTESTINAL ENDOSCOPY      WISDOM TOOTH EXTRACTION        Family History:     Family History   Problem Relation Age of Onset    Diabetes Father     Hypertension Father     Breast cancer Mother [de-identified]    Breast cancer Sister 46    Endometrial cancer Daughter 52    No Known Problems Maternal Grandmother     Cancer Maternal Grandfather         melanoma    No Known Problems Paternal Grandmother     No Known Problems Paternal Grandfather     No Known Problems Sister     No Known Problems Maternal Aunt     No Known Problems Maternal Aunt     No Known Problems Maternal Aunt     No Known Problems Paternal Aunt     No Known Problems Paternal Aunt     No Known Problems Paternal Aunt     No Known Problems Paternal Aunt       Social History:     Social History     Socioeconomic History    Marital status:      Spouse name: None    Number of children: None    Years of education: None    Highest education level: None   Occupational History    None   Tobacco Use    Smoking status: Former Smoker     Quit date: 10/28/1971     Years since quittin 8    Smokeless tobacco: Never Used   Vaping Use    Vaping Use: Never used   Substance and Sexual Activity    Alcohol use: Never    Drug use: No    Sexual activity: None   Other Topics Concern    None   Social History Narrative    None     Social Determinants of Health     Financial Resource Strain:     Difficulty of Paying Living Expenses:    Food Insecurity:     Worried About Running Out of Food in the Last Year:     [address] in the Last Year:    Transportation Needs:     Lack of Transportation (Medical):  Lack of Transportation (Non-Medical):    Physical Activity:     Days of Exercise per Week:     Minutes of Exercise per Session:    Stress:     Feeling of Stress :    Social Connections:     Frequency of Communication with Friends and Family:     Frequency of Social Gatherings with Friends and Family:     Attends Sabian[employer] Services:     Active Member of Clubs or Organizations:     Attends Club or Organization Meetings:     Marital Status:    Intimate Partner Violence:     Fear of Current or Ex-Partner:     Emotionally Abused:     Physically Abused:     Sexually Abused:       Medications and Allergies:     Current Outpatient Medications   Medication Sig Dispense Refill    ergocalciferol (ERGOCALCIFEROL) 1 25 MG (13413 UT) capsule Take 1 cap every OTHER week with dinner        Dulaglutide (Trulicity) 1 5 FH/7 9TC SOPN Inject 0 5 mL (1 5 mg total) under the skin once a week Usually takes Sat but forgot and plans to take  2 mL 0    enalapril (VASOTEC) 2 5 mg tablet Take 2 5 mg by mouth every evening 1    fluconazole (DIFLUCAN) 150 mg tablet TAKE 1 TABLET ONCE  MAY REPEAT IN ONE WEEK IF SYMPTOMS PERSIST   glimepiride (AMARYL) 1 mg tablet TAKE ONE TABLET (1 MG TOTAL) BY MOUTH EVERY MORNING BEFORE BREAKFAST   glimepiride (AMARYL) 2 mg tablet Take 1 tablet (2 mg total) by mouth daily with dinner (Patient taking differently: Take 1 mg by mouth daily with dinner ) 30 tablet 5    JARDIANCE 25 MG TABS Take 25 mg by mouth daily  2    levothyroxine 50 mcg tablet Take 50 mcg by mouth daily   1    levothyroxine 75 mcg tablet Take 75 mcg by mouth daily      lovastatin (MEVACOR) 40 MG tablet Take 40 mg by mouth daily at bedtime  3    VITAMIN D PO Take 50,000 Units by mouth every 14 (fourteen) days       No current facility-administered medications for this visit  No Known Allergies   Immunizations:     Immunization History   Administered Date(s) Administered    INFLUENZA 01/10/2013, 10/30/2015, 11/01/2016, 10/02/2017, 11/26/2018    Influenza Split 11/19/2013, 12/18/2014    Influenza Split High Dose Preservative Free IM 10/02/2017    Influenza, high dose seasonal 0 7 mL 11/26/2018, 11/11/2019    Pneumococcal Conjugate 13-Valent 01/22/2020    Pneumococcal Polysaccharide PPV23 12/01/2014    SARS-CoV-2 / COVID-19 mRNA IM (Moderna) 03/26/2021, 04/28/2021      Health Maintenance:         Topic Date Due    DXA SCAN  11/19/2021    Breast Cancer Screening: Mammogram  05/18/2022    Colorectal Cancer Screening  01/28/2025    Hepatitis C Screening  Completed         Topic Date Due    DTaP,Tdap,and Td Vaccines (1 - Tdap) Never done    Influenza Vaccine (1) 09/01/2021      Medicare Health Risk Assessment:     /60 (BP Location: Left arm, Patient Position: Sitting, Cuff Size: Standard)   Pulse 72   Temp 97 9 °F (36 6 °C) (Temporal)   Resp 16   Ht 4' 10" (1 473 m)   Wt 96 6 kg (213 lb)   SpO2 98%   Breastfeeding No   BMI 44 52 kg/m²      Bhavik Caba is here for her Subsequent Wellness visit   Last Medicare Wellness visit information reviewed, patient interviewed and updates made to the record today  Health Risk Assessment:   Patient rates overall health as fair  Patient feels that their physical health rating is same  Patient is satisfied with their life  Eyesight was rated as same  Hearing was rated as same  Patient feels that their emotional and mental health rating is much better  Patients states they are never, rarely angry  Patient states they are sometimes unusually tired/fatigued  Pain experienced in the last 7 days has been none  Patient states that she has experienced weight loss or gain in last 6 months  Depression Screening:   PHQ-2 Score: 0      Fall Risk Screening: In the past year, patient has experienced: no history of falling in past year      Urinary Incontinence Screening:   Patient has not leaked urine accidently in the last six months  Home Safety:  Patient has trouble with stairs inside or outside of their home  Patient has working smoke alarms and has working carbon monoxide detector  Home safety hazards include: none  Nutrition:   Current diet is Regular  Medications:   Patient is not currently taking any over-the-counter supplements  Patient is able to manage medications  Activities of Daily Living (ADLs)/Instrumental Activities of Daily Living (IADLs):   Walk and transfer into and out of bed and chair?: Yes  Dress and groom yourself?: Yes    Bathe or shower yourself?: Yes    Feed yourself? Yes  Do your laundry/housekeeping?: Yes  Manage your money, pay your bills and track your expenses?: Yes  Make your own meals?: Yes    Do your own shopping?: Yes    Previous Hospitalizations:   Any hospitalizations or ED visits within the last 12 months?: No      Advance Care Planning:   Living will: Yes    Durable POA for healthcare:  Yes    Advanced directive: Yes    Advanced directive counseling given: Yes    Five wishes given: Yes    Patient declined ACP directive: No End of Life Decisions reviewed with patient: Yes    Provider agrees with end of life decisions: Yes      Cognitive Screening:   Provider or family/friend/caregiver concerned regarding cognition?: No    PREVENTIVE SCREENINGS      Cardiovascular Screening:    General: Screening Not Indicated and History Lipid Disorder      Diabetes Screening:     General: Screening Not Indicated and History Diabetes      Colorectal Cancer Screening:     General: Screening Current      Breast Cancer Screening:     General: Screening Current      Cervical Cancer Screening:    General: Screening Not Indicated      Osteoporosis Screening:    General: Screening Current      Abdominal Aortic Aneurysm (AAA) Screening:        General: Screening Not Indicated      Lung Cancer Screening:     General: Screening Not Indicated      Hepatitis C Screening:    General: Screening Current    Screening, Brief Intervention, and Referral to Treatment (SBIRT)    Screening  Typical number of drinks in a day: 0  Typical number of drinks in a week: 0  Interpretation: Low risk drinking behavior  AUDIT-C Screenin) How often did you have a drink containing alcohol in the past year? never  2) How many drinks did you have on a typical day when you were drinking in the past year? never  3) How often did you have 6 or more drinks on one occasion in the past year? never    AUDIT-C Score: 0  Interpretation: Score 0-2 (female): Negative screen for alcohol misuse    Single Item Drug Screening:  How often have you used an illegal drug (including marijuana) or a prescription medication for non-medical reasons in the past year? never    Single Item Drug Screen Score: 0  Interpretation: Negative screen for possible drug use disorder    Other Counseling Topics:   Alcohol use counseling, car/seat belt/driving safety, skin self-exam, sunscreen and calcium and vitamin D intake and regular weightbearing exercise         Kori Martin MD

## 2021-08-05 NOTE — PATIENT INSTRUCTIONS
Medicare Preventive Visit Patient Instructions  Thank you for completing your Welcome to Medicare Visit or Medicare Annual Wellness Visit today  Your next wellness visit will be due in one year (8/6/2022)  The screening/preventive services that you may require over the next 5-10 years are detailed below  Some tests may not apply to you based off risk factors and/or age  Screening tests ordered at today's visit but not completed yet may show as past due  Also, please note that scanned in results may not display below  Preventive Screenings:  Service Recommendations Previous Testing/Comments   Colorectal Cancer Screening  * Colonoscopy    * Fecal Occult Blood Test (FOBT)/Fecal Immunochemical Test (FIT)  * Fecal DNA/Cologuard Test  * Flexible Sigmoidoscopy Age: 54-65 years old   Colonoscopy: every 10 years (may be performed more frequently if at higher risk)  OR  FOBT/FIT: every 1 year  OR  Cologuard: every 3 years  OR  Sigmoidoscopy: every 5 years  Screening may be recommended earlier than age 48 if at higher risk for colorectal cancer  Also, an individualized decision between you and your healthcare provider will decide whether screening between the ages of 74-80 would be appropriate  Colonoscopy: 01/28/2020  FOBT/FIT: Not on file  Cologuard: Not on file  Sigmoidoscopy: Not on file    Screening Current     Breast Cancer Screening Age: 36 years old  Frequency: every 1-2 years  Not required if history of left and right mastectomy Mammogram: 05/18/2021    Screening Current   Cervical Cancer Screening Between the ages of 21-29, pap smear recommended once every 3 years  Between the ages of 33-67, can perform pap smear with HPV co-testing every 5 years     Recommendations may differ for women with a history of total hysterectomy, cervical cancer, or abnormal pap smears in past  Pap Smear: Not on file    Screening Not Indicated   Hepatitis C Screening Once for adults born between 1945 and 1965  More frequently in patients at high risk for Hepatitis C Hep C Antibody: 06/03/2019    Screening Current   Diabetes Screening 1-2 times per year if you're at risk for diabetes or have pre-diabetes Fasting glucose: 124 mg/dL   A1C: 7 2 %    Screening Not Indicated  History Diabetes   Cholesterol Screening Once every 5 years if you don't have a lipid disorder  May order more often based on risk factors  Lipid panel: 05/12/2021    Screening Not Indicated  History Lipid Disorder     Other Preventive Screenings Covered by Medicare:  1  Abdominal Aortic Aneurysm (AAA) Screening: covered once if your at risk  You're considered to be at risk if you have a family history of AAA  2  Lung Cancer Screening: covers low dose CT scan once per year if you meet all of the following conditions: (1) Age 50-69; (2) No signs or symptoms of lung cancer; (3) Current smoker or have quit smoking within the last 15 years; (4) You have a tobacco smoking history of at least 30 pack years (packs per day multiplied by number of years you smoked); (5) You get a written order from a healthcare provider  3  Glaucoma Screening: covered annually if you're considered high risk: (1) You have diabetes OR (2) Family history of glaucoma OR (3)  aged 48 and older OR (3)  American aged 72 and older  3  Osteoporosis Screening: covered every 2 years if you meet one of the following conditions: (1) You're estrogen deficient and at risk for osteoporosis based off medical history and other findings; (2) Have a vertebral abnormality; (3) On glucocorticoid therapy for more than 3 months; (4) Have primary hyperparathyroidism; (5) On osteoporosis medications and need to assess response to drug therapy  · Last bone density test (DXA Scan): 11/19/2019   5  HIV Screening: covered annually if you're between the age of 15-65  Also covered annually if you are younger than 13 and older than 72 with risk factors for HIV infection   For pregnant patients, it is covered up to 3 times per pregnancy  Immunizations:  Immunization Recommendations   Influenza Vaccine Annual influenza vaccination during flu season is recommended for all persons aged >= 6 months who do not have contraindications   Pneumococcal Vaccine (Prevnar and Pneumovax)  * Prevnar = PCV13  * Pneumovax = PPSV23   Adults 25-60 years old: 1-3 doses may be recommended based on certain risk factors  Adults 72 years old: Prevnar (PCV13) vaccine recommended followed by Pneumovax (PPSV23) vaccine  If already received PPSV23 since turning 65, then PCV13 recommended at least one year after PPSV23 dose  Hepatitis B Vaccine 3 dose series if at intermediate or high risk (ex: diabetes, end stage renal disease, liver disease)   Tetanus (Td) Vaccine - COST NOT COVERED BY MEDICARE PART B Following completion of primary series, a booster dose should be given every 10 years to maintain immunity against tetanus  Td may also be given as tetanus wound prophylaxis  Tdap Vaccine - COST NOT COVERED BY MEDICARE PART B Recommended at least once for all adults  For pregnant patients, recommended with each pregnancy  Shingles Vaccine (Shingrix) - COST NOT COVERED BY MEDICARE PART B  2 shot series recommended in those aged 48 and above     Health Maintenance Due:      Topic Date Due    DXA SCAN  11/19/2021    Breast Cancer Screening: Mammogram  05/18/2022    Colorectal Cancer Screening  01/28/2025    Hepatitis C Screening  Completed     Immunizations Due:      Topic Date Due    DTaP,Tdap,and Td Vaccines (1 - Tdap) Never done    Influenza Vaccine (1) 09/01/2021     Advance Directives   What are advance directives? Advance directives are legal documents that state your wishes and plans for medical care  These plans are made ahead of time in case you lose your ability to make decisions for yourself  Advance directives can apply to any medical decision, such as the treatments you want, and if you want to donate organs     What are the types of advance directives? There are many types of advance directives, and each state has rules about how to use them  You may choose a combination of any of the following:  · Living will: This is a written record of the treatment you want  You can also choose which treatments you do not want, which to limit, and which to stop at a certain time  This includes surgery, medicine, IV fluid, and tube feedings  · Durable power of  for healthcare [facility]): This is a written record that states who you want to make healthcare choices for you when you are unable to make them for yourself  This person, called a proxy, is usually a family member or a friend  You may choose more than 1 proxy  · Do not resuscitate (DNR) order:  A DNR order is used in case your heart stops beating or you stop breathing  It is a request not to have certain forms of treatment, such as CPR  A DNR order may be included in other types of advance directives  · Medical directive: This covers the care that you want if you are in a coma, near death, or unable to make decisions for yourself  You can list the treatments you want for each condition  Treatment may include pain medicine, surgery, blood transfusions, dialysis, IV or tube feedings, and a ventilator (breathing machine)  · Values history: This document has questions about your views, beliefs, and how you feel and think about life  This information can help others choose the care that you would choose  Why are advance directives important? An advance directive helps you control your care  Although spoken wishes may be used, it is better to have your wishes written down  Spoken wishes can be misunderstood, or not followed  Treatments may be given even if you do not want them  An advance directive may make it easier for your family to make difficult choices about your care     Weight Management   Why it is important to manage your weight:  Being overweight increases your risk of health conditions such as heart disease, high blood pressure, type 2 diabetes, and certain types of cancer  It can also increase your risk for osteoarthritis, sleep apnea, and other respiratory problems  Aim for a slow, steady weight loss  Even a small amount of weight loss can lower your risk of health problems  How to lose weight safely:  A safe and healthy way to lose weight is to eat fewer calories and get regular exercise  You can lose up about 1 pound a week by decreasing the number of calories you eat by 500 calories each day  Healthy meal plan for weight management:  A healthy meal plan includes a variety of foods, contains fewer calories, and helps you stay healthy  A healthy meal plan includes the following:  · Eat whole-grain foods more often  A healthy meal plan should contain fiber  Fiber is the part of grains, fruits, and vegetables that is not broken down by your body  Whole-grain foods are healthy and provide extra fiber in your diet  Some examples of whole-grain foods are whole-wheat breads and pastas, oatmeal, brown rice, and bulgur  · Eat a variety of vegetables every day  Include dark, leafy greens such as spinach, kale, [place] greens, and mustard greens  Eat yellow and orange vegetables such as carrots, sweet potatoes, and winter squash  · Eat a variety of fruits every day  Choose fresh or canned fruit (canned in its own juice or light syrup) instead of juice  Fruit juice has very little or no fiber  · Eat low-fat dairy foods  Drink fat-free (skim) milk or 1% milk  Eat fat-free yogurt and low-fat cottage cheese  Try low-fat cheeses such as mozzarella and other reduced-fat cheeses  · Choose meat and other protein foods that are low in fat  Choose beans or other legumes such as split peas or lentils  Choose fish, skinless poultry (chicken or turkey), or lean cuts of red meat (beef or pork)  Before you cook meat or poultry, cut off any visible fat  · Use less fat and oil  Try baking foods instead of frying them  Add less fat, such as margarine, sour cream, regular salad dressing and mayonnaise to foods  Eat fewer high-fat foods  Some examples of high-fat foods include french fries, doughnuts, ice cream, and cakes  · Eat fewer sweets  Limit foods and drinks that are high in sugar  This includes candy, cookies, regular soda, and sweetened drinks  Exercise:  Exercise at least 30 minutes per day on most days of the week  Some examples of exercise include walking, biking, dancing, and swimming  You can also fit in more physical activity by taking the stairs instead of the elevator or parking farther away from stores  Ask your healthcare provider about the best exercise plan for you  © Copyright [employer] 2018 Information is for End User's use only and may not be sold, redistributed or otherwise used for commercial purposes   All illustrations and images included in CareNotes® are the copyrighted property of [employer]  or [address] [place], [place] [address]pe

## 2021-08-05 NOTE — PROGRESS NOTES
Assessment/Plan: Close to target control for age  I explained to Negrita the goals of blood sugar levels , with fasting  of 130 or less and  2 hrs after meals of 150 or less  Education given verbally and with written materials  Change Her life style modification again stressed  The vascular complications secondary to diabetes poor control were explained with details  The renal function protection and the prevention of major vascular disease with the use of  ACE inhibitor and statins respectively and exercise/diet was also discussed  Current treatment for diabetes was explained to patient  Not very comfortable wit a Sulnylurea agent since high risk of hypoglycemia, Metformin is the main treatment, which decreases hepatic glucose production and intestinal glucose absorption; increases insulin sensitivity  and GLP1 Receptor Agonist: activates glucagon-like-peptide-1 (GLP-1) receptor, increasing insulin secretion, decreasing glucagon secretion, and delaying gastric emptying (incretin mimetic)  We discussed about the initial approach to the obese patient who desires to lose weight  Patient should diet and exercise, as recommended by the NIH Expert Panel in 1998   A combination of a reduced-calorie diet and exercise is more efficacious than either alone  Additional weight loss may be possible with some medication regimens  The initial goal of weight loss therapy (diet and exercise) is a 10% reduction in body weight over a 6-month period  After the initial 6-month period, we will reassess to determine the efficacy of the therapy, whether the patient needs to lose more weight, or whether a weight-maintenance program may be established  No problem-specific Assessment & Plan notes found for this encounter         Diagnoses and all orders for this visit:    Medicare annual wellness visit, subsequent  -     CBC and differential; Future    Morbid obesity with BMI of 40 0-44 9, adult ([address] )    Uncontrolled type 2 diabetes mellitus with hyperglycemia (HCC)  -     Microalbumin / creatinine urine ratio  -     Ambulatory referral to Podiatry; Future  -     Dulaglutide (Trulicity) 1 5 QN/5 3BW SOPN; Inject 0 5 mL (1 5 mg total) under the skin once a week Usually takes Sat but forgot and plans to take 7/7    Pancreatic cyst  -     MRI abdomen w wo contrast; Future    Other orders  -     Discontinue: Ozempic, 0 25 or 0 5 MG/DOSE, 2 MG/1 5ML SOPN; INJECT 0 5MG SUBCUTANEOUSLY WEEKLY  -     ergocalciferol (ERGOCALCIFEROL) 1 25 MG (05453 UT) capsule; Take 1 cap every OTHER week with dinner   -     glimepiride (AMARYL) 1 mg tablet; TAKE ONE TABLET (1 MG TOTAL) BY MOUTH EVERY MORNING BEFORE BREAKFAST  Subjective:      Patient ID: aMnny Jin is a 78 y o  female  Patient is here for her medical on one visit  She is a morbidly obese patient with BMI more than 40 minutes  She has diabetes type 2 and reports well control home under current treatment given by endocrinologist   She states the best treatment that she got control weight in postprandial hyperglycemia was with Trulicity that she is using 1 5 mg weekly  Last hba1c from May 2021 was 7 1 She continues care with Endocrinologist Dr Sasha Kraus  She denies chest pain or shortness of breath at this time  Previous MRI from January 2019 reported a pancreatic cyst the needs more catheterization  Also a possible hemangioma but needs to repeat  The follows is a copy of Radiologist note: "MRI has characterized the dominant left lobe lesion as a benign hemangioma  The adjacent lesion seen on the ultrasound likely represents a lobulation of this lesion    There is a similar smaller right hepatic lesion measuring 12 mm also likely a   Hemangioma"               [id number]  The following portions of the patient's history were reviewed and updated as appropriate: allergies, current medications, past family history, past medical history, past social history, past surgical history and problem list     Review of Systems   Constitutional: Negative for diaphoresis, fatigue, fever and unexpected weight change  Respiratory: Negative for cough, chest tightness, shortness of breath and wheezing  Cardiovascular: Negative for chest pain, palpitations and leg swelling  Gastrointestinal: Negative for abdominal pain, blood in stool and constipation  Musculoskeletal: Positive for arthralgias  Neurological: Negative for dizziness, syncope, light-headedness and headaches  Hematological: Does not bruise/bleed easily  Psychiatric/Behavioral: Negative for behavioral problems, self-injury and sleep disturbance  The patient is not nervous/anxious  Objective:      /60 (BP Location: Left arm, Patient Position: Sitting, Cuff Size: Standard)   Pulse 72   Temp 97 9 °F (36 6 °C) (Temporal)   Resp 16   Ht 4' 10" (1 473 m)   Wt 96 6 kg (213 lb)   SpO2 98%   Breastfeeding No   BMI 44 52 kg/m²          Physical Exam  Constitutional:       Appearance: Normal appearance  HENT:      Head: Normocephalic  Eyes:      Pupils: Pupils are equal, round, and reactive to light  Cardiovascular:      Rate and Rhythm: Normal rate and regular rhythm  Pulses: no weak pulses          Dorsalis pedis pulses are 2+ on the right side and 2+ on the left side  Posterior tibial pulses are 2+ on the right side and 2+ on the left side  Pulmonary:      Effort: Pulmonary effort is normal    Abdominal:      General: Abdomen is flat  Palpations: Abdomen is soft  Musculoskeletal:         General: Normal range of motion  Cervical back: Neck supple  Feet:    Feet:      Right foot:      Skin integrity: Callus and dry skin present  Left foot:      Skin integrity: Callus and dry skin present  Skin:     General: Skin is warm and dry  Neurological:      Mental Status: She is alert  Psychiatric:         Behavior: Behavior normal        Patient's shoes and socks removed  Right Foot/Ankle   Right Foot Inspection  Skin Exam: dry skin, callus and callus                          Toe Exam: ROM and strength within normal limits  Sensory       Monofilament testing: diminished  Vascular  Capillary refills: < 3 seconds  The right DP pulse is 2+  The right PT pulse is 2+  Left Foot/Ankle  Left Foot Inspection  Skin Exam: dry skin and callus                         Toe Exam: ROM and strength within normal limits                   Sensory       Monofilament: diminished  Vascular  Capillary refills: < 3 seconds  The left DP pulse is 2+  The left PT pulse is 2+  Assign Risk Category:  No deformity present; Loss of protective sensation; No weak pulses       Risk: 1      Answers for HPI/ROS submitted by the patient on 7/29/2021  How would you rate your overall health?: fair  Compared to last year, how is your physical health?: same  In general, how satisfied are you with your life?: satisfied  Compared to last year, how is your eyesight?: same  Compared to last year, how is your hearing?: same  Compared to last year, how is your emotional/mental health?: much better  How often is anger a problem for you?: never, rarely  How often do you feel unusually tired/fatigued?: sometimes  In the past 7 days, how much pain have you experienced?: none  In the past 6 months, have you lost or gained 10 pounds without trying?: Yes  One or more falls in the last year: No  In the past 6 months, have you accidentally leaked urine?: No  Do you have trouble with the stairs inside or outside your home?: Yes  Does your home have working smoke alarms?: Yes  Does your home have a carbon monoxide monitor?: Yes  Which safety hazards (if any) have you experienced in your home? Please select all that apply : none  How would you describe your current diet?  Please select all that apply : Regular  In addition to prescription medications, are you taking any over-the-counter supplements?: No  Can you manage your medications?: Yes  Are you currently taking any opioid medications?: No  Can you walk and transfer into and out of your bed and chair?: Yes  Can you dress and groom yourself?: Yes  Can you bathe or shower yourself?: Yes  Can you feed yourself?: Yes  Can you do your laundry/ housekeeping?: Yes  Can you manage your money, pay your bills, and track your expenses?: Yes  Can you make your own meals?: Yes  Can you do your own shopping?: Yes  Within the last 12 months, have you had any hospitalizations or Emergency Department visits?: No  Do you have a living will?: Yes  Do you have a Durable POA (Power of ) for healthcare decisions?: Yes  Do you have an Advanced Directive for end of life decisions?: Yes  How often have you used an illegal drug (including marijuana) or a prescription medication for non-medical reasons in the past year?: never  What is the typical number of drinks you consume in a day?: 0  What is the typical number of drinks you consume in a week?: 0  How often did you have a drink containing alcohol in the past year?: never  How many drinks did you have on a typical day  when you were drinking in the past year?: never  How often did you have 6 or more drinks on one occasion in the past year?: never    BMI Counseling: Body mass index is 44 52 kg/m²  The BMI is above normal  Nutrition recommendations include consuming healthier snacks, decreasing soda and/or juice intake, moderation in carbohydrate intake and increasing intake of lean protein

## 2021-09-09 ENCOUNTER — LAB (OUTPATIENT)
Dept: LAB | Facility: IMAGING CENTER | Age: 76
End: 2021-09-09
Payer: COMMERCIAL

## 2021-09-09 DIAGNOSIS — E11.9 TYPE 2 DIABETES MELLITUS WITHOUT COMPLICATION, WITHOUT LONG-TERM CURRENT USE OF INSULIN (HCC): ICD-10-CM

## 2021-09-09 DIAGNOSIS — Z00.00 MEDICARE ANNUAL WELLNESS VISIT, SUBSEQUENT: ICD-10-CM

## 2021-09-09 DIAGNOSIS — E55.9 VITAMIN D DEFICIENCY: ICD-10-CM

## 2021-09-09 LAB
25(OH)D3 SERPL-MCNC: 49.3 NG/ML (ref 30–100)
ALBUMIN SERPL BCP-MCNC: 3.5 G/DL (ref 3.5–5)
ALP SERPL-CCNC: 121 U/L (ref 46–116)
ALT SERPL W P-5'-P-CCNC: 22 U/L (ref 12–78)
ANION GAP SERPL CALCULATED.3IONS-SCNC: 2 MMOL/L (ref 4–13)
AST SERPL W P-5'-P-CCNC: 13 U/L (ref 5–45)
BASOPHILS # BLD AUTO: 0.06 THOUSANDS/ΜL (ref 0–0.1)
BASOPHILS NFR BLD AUTO: 1 % (ref 0–1)
BILIRUB SERPL-MCNC: 1.05 MG/DL (ref 0.2–1)
BUN SERPL-MCNC: 20 MG/DL (ref 5–25)
CALCIUM SERPL-MCNC: 10 MG/DL (ref 8.3–10.1)
CHLORIDE SERPL-SCNC: 106 MMOL/L (ref 100–108)
CO2 SERPL-SCNC: 31 MMOL/L (ref 21–32)
CREAT SERPL-MCNC: 0.85 MG/DL (ref 0.6–1.3)
CREAT UR-MCNC: 89.1 MG/DL
EOSINOPHIL # BLD AUTO: 0.2 THOUSAND/ΜL (ref 0–0.61)
EOSINOPHIL NFR BLD AUTO: 2 % (ref 0–6)
ERYTHROCYTE [DISTWIDTH] IN BLOOD BY AUTOMATED COUNT: 14.8 % (ref 11.6–15.1)
EST. AVERAGE GLUCOSE BLD GHB EST-MCNC: 157 MG/DL
GFR SERPL CREATININE-BSD FRML MDRD: 68 ML/MIN/1.73SQ M
GLUCOSE P FAST SERPL-MCNC: 132 MG/DL (ref 65–99)
HBA1C MFR BLD: 7.1 %
HCT VFR BLD AUTO: 47.6 % (ref 34.8–46.1)
HGB BLD-MCNC: 15 G/DL (ref 11.5–15.4)
IMM GRANULOCYTES # BLD AUTO: 0.04 THOUSAND/UL (ref 0–0.2)
IMM GRANULOCYTES NFR BLD AUTO: 1 % (ref 0–2)
LYMPHOCYTES # BLD AUTO: 3.12 THOUSANDS/ΜL (ref 0.6–4.47)
LYMPHOCYTES NFR BLD AUTO: 37 % (ref 14–44)
MCH RBC QN AUTO: 27.9 PG (ref 26.8–34.3)
MCHC RBC AUTO-ENTMCNC: 31.5 G/DL (ref 31.4–37.4)
MCV RBC AUTO: 89 FL (ref 82–98)
MICROALBUMIN UR-MCNC: 5.1 MG/L (ref 0–20)
MICROALBUMIN/CREAT 24H UR: 6 MG/G CREATININE (ref 0–30)
MONOCYTES # BLD AUTO: 0.86 THOUSAND/ΜL (ref 0.17–1.22)
MONOCYTES NFR BLD AUTO: 10 % (ref 4–12)
NEUTROPHILS # BLD AUTO: 4.16 THOUSANDS/ΜL (ref 1.85–7.62)
NEUTS SEG NFR BLD AUTO: 49 % (ref 43–75)
NRBC BLD AUTO-RTO: 0 /100 WBCS
PLATELET # BLD AUTO: 322 THOUSANDS/UL (ref 149–390)
PMV BLD AUTO: 11.3 FL (ref 8.9–12.7)
POTASSIUM SERPL-SCNC: 4.5 MMOL/L (ref 3.5–5.3)
PROT SERPL-MCNC: 7.5 G/DL (ref 6.4–8.2)
RBC # BLD AUTO: 5.38 MILLION/UL (ref 3.81–5.12)
SODIUM SERPL-SCNC: 139 MMOL/L (ref 136–145)
WBC # BLD AUTO: 8.44 THOUSAND/UL (ref 4.31–10.16)

## 2021-09-09 PROCEDURE — 36415 COLL VENOUS BLD VENIPUNCTURE: CPT

## 2021-09-09 PROCEDURE — 3061F NEG MICROALBUMINURIA REV: CPT | Performed by: FAMILY MEDICINE

## 2021-09-09 PROCEDURE — 82043 UR ALBUMIN QUANTITATIVE: CPT

## 2021-09-09 PROCEDURE — 85025 COMPLETE CBC W/AUTO DIFF WBC: CPT

## 2021-09-09 PROCEDURE — 82570 ASSAY OF URINE CREATININE: CPT

## 2021-09-09 PROCEDURE — 80053 COMPREHEN METABOLIC PANEL: CPT

## 2021-09-09 PROCEDURE — 83036 HEMOGLOBIN GLYCOSYLATED A1C: CPT

## 2021-09-09 PROCEDURE — 3051F HG A1C>EQUAL 7.0%<8.0%: CPT | Performed by: FAMILY MEDICINE

## 2021-09-09 PROCEDURE — 82306 VITAMIN D 25 HYDROXY: CPT

## 2021-10-08 ENCOUNTER — VBI (OUTPATIENT)
Dept: ADMINISTRATIVE | Facility: OTHER | Age: 76
End: 2021-10-08

## 2021-11-05 ENCOUNTER — HOSPITAL ENCOUNTER (OUTPATIENT)
Dept: MRI IMAGING | Facility: HOSPITAL | Age: 76
Discharge: HOME | End: 2021-11-05
Payer: COMMERCIAL

## 2021-11-05 DIAGNOSIS — K86.2 PANCREATIC CYST: ICD-10-CM

## 2021-11-05 PROCEDURE — A9585 GADOBUTROL INJECTION: HCPCS | Performed by: FAMILY MEDICINE

## 2021-11-05 PROCEDURE — G1004 CDSM NDSC: HCPCS

## 2021-11-05 PROCEDURE — 74183 MRI ABD W/O CNTR FLWD CNTR: CPT

## 2021-11-05 RX ADMIN — GADOBUTROL 10 ML: 604.72 INJECTION INTRAVENOUS at 15:07

## 2021-11-10 ENCOUNTER — TELEPHONE (OUTPATIENT)
Dept: FAMILY MEDICINE CLINIC | Facility: CLINIC | Age: 76
End: 2021-11-10

## 2021-11-12 ENCOUNTER — HOSPITAL ENCOUNTER (OUTPATIENT)
Dept: RADIOLOGY | Facility: IMAGING CENTER | Age: 76
Discharge: HOME | End: 2021-11-12
Payer: COMMERCIAL

## 2021-11-12 DIAGNOSIS — M79.672 LEFT FOOT PAIN: ICD-10-CM

## 2021-11-12 PROCEDURE — 73630 X-RAY EXAM OF FOOT: CPT

## 2021-11-16 ENCOUNTER — VBI (OUTPATIENT)
Dept: ADMINISTRATIVE | Facility: OTHER | Age: 76
End: 2021-11-16

## 2021-12-02 ENCOUNTER — RA CDI HCC (OUTPATIENT)
Dept: OTHER | Facility: HOSPITAL | Age: 76
End: 2021-12-02

## 2021-12-09 ENCOUNTER — OFFICE VISIT (OUTPATIENT)
Dept: FAMILY MEDICINE CLINIC | Facility: CLINIC | Age: 76
End: 2021-12-09
Payer: COMMERCIAL

## 2021-12-09 VITALS
HEIGHT: 58 IN | RESPIRATION RATE: 16 BRPM | OXYGEN SATURATION: 97 % | SYSTOLIC BLOOD PRESSURE: 120 MMHG | BODY MASS INDEX: 45.13 KG/M2 | DIASTOLIC BLOOD PRESSURE: 70 MMHG | WEIGHT: 215 LBS | HEART RATE: 78 BPM | TEMPERATURE: 97.8 F

## 2021-12-09 DIAGNOSIS — E11.9 CONTROLLED TYPE 2 DIABETES MELLITUS WITHOUT COMPLICATION, WITHOUT LONG-TERM CURRENT USE OF INSULIN (HCC): ICD-10-CM

## 2021-12-09 DIAGNOSIS — D13.6 INTRADUCTAL PAPILLARY MUCINOUS ADENOMA OF PANCREAS: ICD-10-CM

## 2021-12-09 DIAGNOSIS — Z23 NEEDS FLU SHOT: Primary | ICD-10-CM

## 2021-12-09 DIAGNOSIS — M85.89 OSTEOPENIA OF MULTIPLE SITES: ICD-10-CM

## 2021-12-09 DIAGNOSIS — R74.8 ALKALINE PHOSPHATASE ELEVATION: ICD-10-CM

## 2021-12-09 DIAGNOSIS — E03.9 ACQUIRED HYPOTHYROIDISM: ICD-10-CM

## 2021-12-09 PROCEDURE — 90662 IIV NO PRSV INCREASED AG IM: CPT | Performed by: FAMILY MEDICINE

## 2021-12-09 PROCEDURE — 99214 OFFICE O/P EST MOD 30 MIN: CPT | Performed by: FAMILY MEDICINE

## 2021-12-09 PROCEDURE — 3008F BODY MASS INDEX DOCD: CPT | Performed by: FAMILY MEDICINE

## 2021-12-09 PROCEDURE — 1160F RVW MEDS BY RX/DR IN RCRD: CPT | Performed by: FAMILY MEDICINE

## 2021-12-09 PROCEDURE — 1036F TOBACCO NON-USER: CPT | Performed by: FAMILY MEDICINE

## 2021-12-09 PROCEDURE — G0008 ADMIN INFLUENZA VIRUS VAC: HCPCS | Performed by: FAMILY MEDICINE

## 2021-12-19 PROBLEM — R74.8 ALKALINE PHOSPHATASE ELEVATION: Status: ACTIVE | Noted: 2021-12-19

## 2021-12-19 PROBLEM — Z23 NEEDS FLU SHOT: Status: ACTIVE | Noted: 2021-12-19

## 2021-12-19 PROBLEM — M85.89 OSTEOPENIA OF MULTIPLE SITES: Status: ACTIVE | Noted: 2021-12-19

## 2021-12-19 PROBLEM — D13.6 INTRADUCTAL PAPILLARY MUCINOUS ADENOMA OF PANCREAS: Status: ACTIVE | Noted: 2021-12-19

## 2022-01-24 ENCOUNTER — HOSPITAL ENCOUNTER (OUTPATIENT)
Dept: BONE DENSITY | Facility: CLINIC | Age: 77
Discharge: HOME | End: 2022-01-24
Payer: COMMERCIAL

## 2022-01-24 DIAGNOSIS — Z78.0 OSTEOPENIA AFTER MENOPAUSE: ICD-10-CM

## 2022-01-24 DIAGNOSIS — M85.80 OSTEOPENIA AFTER MENOPAUSE: ICD-10-CM

## 2022-01-24 DIAGNOSIS — M85.89 OSTEOPENIA OF MULTIPLE SITES: ICD-10-CM

## 2022-01-24 PROCEDURE — 77080 DXA BONE DENSITY AXIAL: CPT

## 2022-03-30 ENCOUNTER — CONSULT (OUTPATIENT)
Dept: GASTROENTEROLOGY | Facility: CLINIC | Age: 77
End: 2022-03-30
Payer: COMMERCIAL

## 2022-03-30 VITALS
DIASTOLIC BLOOD PRESSURE: 70 MMHG | OXYGEN SATURATION: 97 % | WEIGHT: 216 LBS | TEMPERATURE: 98.7 F | SYSTOLIC BLOOD PRESSURE: 118 MMHG | HEART RATE: 77 BPM | BODY MASS INDEX: 43.55 KG/M2 | HEIGHT: 59 IN

## 2022-03-30 DIAGNOSIS — K22.0 ACHALASIA: Primary | ICD-10-CM

## 2022-03-30 DIAGNOSIS — D13.6 INTRADUCTAL PAPILLARY MUCINOUS ADENOMA OF PANCREAS: ICD-10-CM

## 2022-03-30 PROCEDURE — 99214 OFFICE O/P EST MOD 30 MIN: CPT | Performed by: INTERNAL MEDICINE

## 2022-03-30 NOTE — PATIENT INSTRUCTIONS
Procedure:   EUS  Scheduled date of procedure (as of today): 5/4/22  Physician performing procedure: Dr Ling Torrez  Location of procedure: [place]   Instructions reviewed with patient by:  Katja Sanderson  Clearances:  n/a

## 2022-03-30 NOTE — PROGRESS NOTES
[facility] - Outpatient Follow-up  Simon Aj 78 y o  female MRN: [id number]  Encounter: [id number]        ASSESSMENT AND PLAN   Patient is a 78year old female with a past medical history of type 2 achalasia s/p Heller myotomy, DM2, Hypothyroidism, CKD, HTN who presents for symptoms of vague left upper quadrant pain     1  Intraductal papillary mucinous adenoma of pancreas  · MRI of the pancreas showed pancreatic divisum and multiple sub-centimeter side branch IPMN's and one 1 2x1 3cm uncinate process branch duct IPMN  · Given the low malignant potential patient will only need serial imaging for the same  · Will obtain repeat MRI in 2 years to evaluate cysts  · No indication for EUS or surgical referral currently  · Unlikely to be the source of abdominal pain    2  Body mass index (BMI) 40 0-44 9, adult (HCC)  · Advised diet and weight loss    3  Achalasia  · History of type 2 Achalasia s/p Heller's myotomy 2 years ago  · Doing well from this standpoint and denies any dysphagia, GERD    4  LUQ abdominal pain  · Vague abdominal discomfort that patient describes ongoing since a year  · It is worse with positional changes and lifting heavy weights  · I suspect this is a MSK pain and not related to her pancreatic cyst  · I told patient we can try lidocaine patches if there is no improvement         HISTORY OF PRESENT ILLNESS     Patient is a 78year old female with a past medical history of type 2 achalasia s/p Heller myotomy, DM2, Hypothyroidism, CKD, HTN who presents for symptoms of vague left upper quadrant pain  This has been ongoing since the past 1 year and she reports that it is worse with movement  She feels like it gets worse when she lifts heavy weights as well  She denies any relation with food intake and she denies any relief after a bowel movement  She denies nausea or vomiting   She does have constipation sometimes and diarrhea sometimes that she thinks is because of her diet  Otherwise denies any other symptoms   She is uptodate on her colonoscopies      REVIEW OF SYSTEMS     Negative other than HPI      Historical information     Past Medical History:   Diagnosis Date    Arthritis     Colon polyp     Diabetes [facility]     Diabetes mellitus [facility]     [facility]     GERD (gastroesophageal reflux disease)     Hard of hearing     has hearing aids but doesnt wear them    Hiatal hernia     History of total right knee replacement     Hyperlipidemia     Hypertension     Muscle weakness     Obesity     Refusal of blood product     Risk for falls     Stress incontinence     Swelling of both lower extremities     Thyroid disease     hypo    Tinnitus     Use of cane as ambulatory aid     Wears glasses      Past Surgical History:   Procedure Laterality Date    CATARACT EXTRACTION Bilateral      SECTION      x2    COLONOSCOPY  02/15/2016    small polyp, repeat 5yrs    EGD      EYE SURGERY      HYSTERECTOMY      age 48    JOINT REPLACEMENT Right     knee    OOPHORECTOMY      both removed age 48    SD LAP, ESOPHAGOMYOTOMY W FUNDOPLASTY N/A 2020    Procedure: LAPAROSCOPIC HELLER MYOTOMY WITH TOUPET FUNDOPLICATION  W ROBOTICS;  Surgeon: Indira Boudreaux MD;  Location: [facility];  Service: General    SD TOTAL KNEE ARTHROPLASTY Right 2019    Procedure: ARTHROPLASTY KNEE TOTAL;  Surgeon: Lubna Gama DO;  Location: [facility];  Service: Orthopedics    UPPER GASTROINTESTINAL ENDOSCOPY      WISDOM TOOTH EXTRACTION       Social History   Social History     Substance and Sexual Activity   Alcohol Use Never     Social History     Substance and Sexual Activity   Drug Use No     Social History     Tobacco Use   Smoking Status Former Smoker    Quit date: 10/28/1971    Years since quittin 4   Smokeless Tobacco Never Used     Family History   Problem Relation Age of Onset    Diabetes Father     Hypertension Father     Breast cancer Mother [de-identified]    Breast cancer Sister 46    Endometrial cancer Daughter 52    No Known Problems Maternal Grandmother     Cancer Maternal Grandfather         melanoma    No Known Problems Paternal Grandmother     No Known Problems Paternal Grandfather     No Known Problems Sister     No Known Problems Maternal Aunt     No Known Problems Maternal Aunt     No Known Problems Maternal Aunt     No Known Problems Paternal Aunt     No Known Problems Paternal Aunt     No Known Problems Paternal Aunt     No Known Problems Paternal Aunt        Allergies       Current Outpatient Medications:     Dulaglutide (Trulicity) 1 5 BF/6 1PC SOPN    enalapril (VASOTEC) 2 5 mg tablet    ergocalciferol (ERGOCALCIFEROL) 1 25 MG (72240 UT) capsule    glimepiride (AMARYL) 1 mg tablet    JARDIANCE 25 MG TABS    levothyroxine 50 mcg tablet    levothyroxine 75 mcg tablet    lovastatin (MEVACOR) 40 MG tablet    VITAMIN D PO    No Known Allergies        Objective assessment       Blood pressure 118/70, pulse 77, temperature 98 7 °F (37 1 °C), temperature source Tympanic, height 4' 11" (1 499 m), weight 98 kg (216 lb), SpO2 97 %, not currently breastfeeding  Body mass index is 43 63 kg/m²  PHYSICAL EXAM:         General Appearance:   Alert, cooperative, no distress   HEENT:   Normocephalic, atraumatic, anicteric  Neck:  Supple, symmetrical, trachea midline   Lungs:   Clear to auscultation bilaterally; no rales, rhonchi or wheezing; respirations unlabored    Heart[de-identified]   Regular rate and rhythm; no murmur, rub, or gallop  Abdomen:   Soft, non-tender, non-distended; normal bowel sounds; no masses, no organomegaly    Genitalia:   Deferred    Rectal:   Deferred    Extremities:  No cyanosis, clubbing or edema    Pulses:  2+ and symmetric    Skin:  No jaundice, rashes, or lesions    Lymph nodes:  No palpable cervical lymphadenopathy        Lab Results:      No visits with results within 1 Day(s) from this visit     Latest known visit with results is:   Lab on 09/09/2021   Component Date Value    WBC 09/09/2021 8 44     RBC 09/09/2021 5 38*    Hemoglobin 09/09/2021 15 0     Hematocrit 09/09/2021 47 6*    MCV 09/09/2021 89     MCH 09/09/2021 27 9     MCHC 09/09/2021 31 5     RDW 09/09/2021 14 8     MPV 09/09/2021 11 3     Platelets 79/19/0926 322     nRBC 09/09/2021 0     Neutrophils Relative 09/09/2021 49     Immat GRANS % 09/09/2021 1     Lymphocytes Relative 09/09/2021 37     Monocytes Relative 09/09/2021 10     Eosinophils Relative 09/09/2021 2     Basophils Relative 09/09/2021 1     Neutrophils Absolute 09/09/2021 4 16     Immature Grans Absolute 09/09/2021 0 04     Lymphocytes Absolute 09/09/2021 3 12     Monocytes Absolute 09/09/2021 0 86     Eosinophils Absolute 09/09/2021 0 20     Basophils Absolute 09/09/2021 0 06     Hemoglobin A1C 09/09/2021 7 1*    EAG 09/09/2021 157     Sodium 09/09/2021 139     Potassium 09/09/2021 4 5     Chloride 09/09/2021 106     CO2 09/09/2021 31     ANION GAP 09/09/2021 2*    BUN 09/09/2021 20     Creatinine 09/09/2021 0 85     Glucose, Fasting 09/09/2021 132*    Calcium 09/09/2021 10 0     AST 09/09/2021 13     ALT 09/09/2021 22     Alkaline Phosphatase 09/09/2021 121*    Total Protein 09/09/2021 7 5     Albumin 09/09/2021 3 5     Total Bilirubin 09/09/2021 1 05*    eGFR 09/09/2021 68     Vit D, 25-Hydroxy 09/09/2021 49 3     Creatinine, Ur 09/09/2021 89 1     Microalbum  ,U,Random 09/09/2021 5 1     Microalb Creat Ratio 09/09/2021 6          Radiology Results:      No results found        Anca Aragon MD  [facility] Fellow

## 2022-03-31 ENCOUNTER — TELEPHONE (OUTPATIENT)
Dept: GASTROENTEROLOGY | Facility: CLINIC | Age: 77
End: 2022-03-31

## 2022-03-31 NOTE — TELEPHONE ENCOUNTER
----- Message from Kiah Tian MD sent at 3/31/2022 11:58 AM EDT -----  Regarding: EUS cancellation  Dear staff,    I called and spoke to patient after discussing with Dr Francesca Mann  She does not need an EUS that was scheduled yesterday  Could we please cancel the procedure  Thank-you!   Kiah Tian MD  Gastroenterology Fellow

## 2022-04-12 ENCOUNTER — LAB (OUTPATIENT)
Dept: LAB | Facility: IMAGING CENTER | Age: 77
End: 2022-04-12
Payer: COMMERCIAL

## 2022-04-12 DIAGNOSIS — E11.69 DIABETES MELLITUS ASSOCIATED WITH HORMONAL ETIOLOGY (HCC): ICD-10-CM

## 2022-04-12 DIAGNOSIS — E03.9 ACQUIRED HYPOTHYROIDISM: ICD-10-CM

## 2022-04-12 DIAGNOSIS — E78.5 DYSLIPIDEMIA: ICD-10-CM

## 2022-04-12 DIAGNOSIS — E03.8 HYPOTHYROIDISM DUE TO HASHIMOTO'S THYROIDITIS: ICD-10-CM

## 2022-04-12 DIAGNOSIS — E06.3 HYPOTHYROIDISM DUE TO HASHIMOTO'S THYROIDITIS: ICD-10-CM

## 2022-04-12 DIAGNOSIS — R74.8 ALKALINE PHOSPHATASE ELEVATION: ICD-10-CM

## 2022-04-12 DIAGNOSIS — E11.9 CONTROLLED TYPE 2 DIABETES MELLITUS WITHOUT COMPLICATION, WITHOUT LONG-TERM CURRENT USE OF INSULIN (HCC): ICD-10-CM

## 2022-04-12 LAB
ALBUMIN SERPL BCP-MCNC: 3.8 G/DL (ref 3.5–5)
ALP SERPL-CCNC: 107 U/L (ref 46–116)
ALT SERPL W P-5'-P-CCNC: 22 U/L (ref 12–78)
ANION GAP SERPL CALCULATED.3IONS-SCNC: 1 MMOL/L (ref 4–13)
AST SERPL W P-5'-P-CCNC: 16 U/L (ref 5–45)
BILIRUB SERPL-MCNC: 0.82 MG/DL (ref 0.2–1)
BUN SERPL-MCNC: 17 MG/DL (ref 5–25)
CALCIUM SERPL-MCNC: 9.9 MG/DL (ref 8.3–10.1)
CHLORIDE SERPL-SCNC: 109 MMOL/L (ref 100–108)
CHOLEST SERPL-MCNC: 157 MG/DL
CO2 SERPL-SCNC: 30 MMOL/L (ref 21–32)
CREAT SERPL-MCNC: 0.83 MG/DL (ref 0.6–1.3)
GFR SERPL CREATININE-BSD FRML MDRD: 69 ML/MIN/1.73SQ M
GLUCOSE P FAST SERPL-MCNC: 190 MG/DL (ref 65–99)
HDLC SERPL-MCNC: 50 MG/DL
LDLC SERPL CALC-MCNC: 67 MG/DL (ref 0–100)
NONHDLC SERPL-MCNC: 107 MG/DL
POTASSIUM SERPL-SCNC: 4.3 MMOL/L (ref 3.5–5.3)
PROT SERPL-MCNC: 6.9 G/DL (ref 6.4–8.2)
SODIUM SERPL-SCNC: 140 MMOL/L (ref 136–145)
T4 FREE SERPL-MCNC: 1.14 NG/DL (ref 0.76–1.46)
TRIGL SERPL-MCNC: 200 MG/DL
TSH SERPL DL<=0.05 MIU/L-ACNC: 2.05 UIU/ML (ref 0.45–4.5)

## 2022-04-12 PROCEDURE — 84443 ASSAY THYROID STIM HORMONE: CPT

## 2022-04-12 PROCEDURE — 84439 ASSAY OF FREE THYROXINE: CPT

## 2022-04-12 PROCEDURE — 84080 ASSAY ALKALINE PHOSPHATASES: CPT

## 2022-04-12 PROCEDURE — 80061 LIPID PANEL: CPT

## 2022-04-12 PROCEDURE — 36415 COLL VENOUS BLD VENIPUNCTURE: CPT

## 2022-04-12 PROCEDURE — 83036 HEMOGLOBIN GLYCOSYLATED A1C: CPT

## 2022-04-12 PROCEDURE — 80053 COMPREHEN METABOLIC PANEL: CPT

## 2022-04-13 LAB
EST. AVERAGE GLUCOSE BLD GHB EST-MCNC: 192 MG/DL
HBA1C MFR BLD: 8.3 %

## 2022-04-13 PROCEDURE — 3052F HG A1C>EQUAL 8.0%<EQUAL 9.0%: CPT | Performed by: INTERNAL MEDICINE

## 2022-04-15 LAB — ALP BONE SERPL-MCNC: 21 UG/L

## 2022-04-15 NOTE — RESULT ENCOUNTER NOTE
Please call patient the blood sugar has been higher with last hemoglobin A1c of 8 3  Please follow with endocrinologist Dr Chance Jacobson to adjust your medications

## 2022-05-24 ENCOUNTER — RA CDI HCC (OUTPATIENT)
Dept: OTHER | Facility: HOSPITAL | Age: 77
End: 2022-05-24

## 2022-05-24 NOTE — PROGRESS NOTES
E66 01, e11 22, e11 65  [facility]  coding opportunities          Chart Reviewed number of suggestions sent to Provider: 3     Patients Insurance     Medicare Insurance: [employer]

## 2022-06-13 ENCOUNTER — OFFICE VISIT (OUTPATIENT)
Dept: FAMILY MEDICINE CLINIC | Facility: CLINIC | Age: 77
End: 2022-06-13
Payer: COMMERCIAL

## 2022-06-13 VITALS
DIASTOLIC BLOOD PRESSURE: 70 MMHG | HEART RATE: 72 BPM | HEIGHT: 58 IN | SYSTOLIC BLOOD PRESSURE: 134 MMHG | WEIGHT: 213 LBS | BODY MASS INDEX: 44.71 KG/M2 | OXYGEN SATURATION: 98 % | RESPIRATION RATE: 16 BRPM | TEMPERATURE: 97.9 F

## 2022-06-13 DIAGNOSIS — E11.69 TYPE 2 DIABETES MELLITUS WITH OTHER SPECIFIED COMPLICATION, WITHOUT LONG-TERM CURRENT USE OF INSULIN (HCC): Primary | ICD-10-CM

## 2022-06-13 DIAGNOSIS — E66.01 MORBID OBESITY WITH BMI OF 40.0-44.9, ADULT (HCC): ICD-10-CM

## 2022-06-13 DIAGNOSIS — M25.511 ACUTE PAIN OF RIGHT SHOULDER: ICD-10-CM

## 2022-06-13 PROCEDURE — 3725F SCREEN DEPRESSION PERFORMED: CPT | Performed by: FAMILY MEDICINE

## 2022-06-13 PROCEDURE — 1101F PT FALLS ASSESS-DOCD LE1/YR: CPT | Performed by: FAMILY MEDICINE

## 2022-06-13 PROCEDURE — 3008F BODY MASS INDEX DOCD: CPT | Performed by: FAMILY MEDICINE

## 2022-06-13 PROCEDURE — 99214 OFFICE O/P EST MOD 30 MIN: CPT | Performed by: FAMILY MEDICINE

## 2022-06-13 PROCEDURE — 3288F FALL RISK ASSESSMENT DOCD: CPT | Performed by: FAMILY MEDICINE

## 2022-06-13 PROCEDURE — 1160F RVW MEDS BY RX/DR IN RCRD: CPT | Performed by: FAMILY MEDICINE

## 2022-06-13 RX ORDER — ZOSTER VACCINE RECOMBINANT, ADJUVANTED 50 MCG/0.5
0.5 KIT INTRAMUSCULAR
COMMUNITY
Start: 2022-04-14 | End: 2023-03-06 | Stop reason: ALTCHOICE

## 2022-06-13 RX ORDER — SEMAGLUTIDE 1.34 MG/ML
INJECTION, SOLUTION SUBCUTANEOUS
COMMUNITY
Start: 2022-04-14

## 2022-06-13 NOTE — PROGRESS NOTES
Assessment/Plan: For any pain recommended to use Voltaren topical as well for the shoulder pain  Repositioned shoulder was encouraged  Diabetes mellitus  Patient is close to target for age  She continue follow-up with endocrinology  I get samples of Jardiance in this office 10 mg take one daily  ED protection is with enalapril 2 5 mg daily  The treatment for diabetes is in base glimepiride, Jardiance and Trulicity  Diagnoses and all orders for this visit:    Type 2 diabetes mellitus with other specified complication, without long-term current use of insulin ([facility])  -     Empagliflozin (Jardiance) 10 MG TABS; Take 1 tablet (10 mg total) by mouth every morning    Acute pain of right shoulder  -     Diclofenac Sodium (VOLTAREN) 1 %; Apply 2 g topically 4 (four) times a day    Morbid obesity with BMI of 40 0-44 9, adult ([facility])  -     Mammo screening bilateral w 3d & cad; Future    Other orders  -     Ozempic, 1 MG/DOSE, 4 MG/3ML SOPN injection pen; INJECT 1 MG UNDER THE SKIN EVERY 7 DAYS  Subjective:     Patient ID: Boris Oliva is a 78 y o  female  HPI  She has Right Shoulder and knee pain  She states is usually related to 25, writing and using computer  Patient is diabetic and follows with Dr Sen Morrison  She does not have coverage for Jardiance and requires samples  She has T3  for medications  Last hemoglobin A1c on April 2022 was 8 3  Review of Systems      Objective:     Physical Exam    BMI Counseling: Body mass index is 44 52 kg/m²  The BMI is above normal  Nutrition recommendations include decreasing soda and/or juice intake  Exercise recommendations include exercising 3-5 times per week

## 2022-06-13 NOTE — ASSESSMENT & PLAN NOTE
Lab Results   Component Value Date    HGBA1C 8 3 (H) 04/12/2022   Fede Posey has diabetes type 2,   Current phamacological treatment is as follow: enalapril  ergocalciferol  glimepiride  Jardiance Tabs  levothyroxine  lovastatin  Ozempic (1 MG/DOSE) Sopn  Shingrix Susr  Trulicity Sopn  VITAMIN D PO     HbA1C shows poor  control  Lab Results   Component Value Date/Time    HGBA1C 8 3 (H) 04/12/2022 08:47 AM     I reviewed recent labs and current medications with patient  I explained to Negrita the goals of blood sugar levels , with fasting  of less than 130 mg/dl and less than 150 mg/dl 2 hrs after meals  she is taking enalapril for renal protection  Last MACR  normal GFR and normal albuminuria CKD STAGE G2/A1  Lab Results   Component Value Date/Time    MICROALBCRE 6 09/09/2021 10:33 AM    and GFR is   Lab Results   Component Value Date/Time    BUN 17 04/12/2022 08:47 AM    EGFR 69 04/12/2022 08:47 AM      Current treatment for diabetes was explained to patient Sulnylureas and GLP1 Receptor Agonist: activates glucagon-like-peptide-1 (GLP-1) receptor, increasing insulin secretion, decreasing glucagon secretion, and delaying gastric emptying (incretin mimetic)  The need for statin therapy explained  Current statin treatment is lovastatin  LDL is at target:  Lab Results   Component Value Date/Time    [facility] 04/12/2022 08:47 AM         Stressed also the importance of life style modification  Discussed complication from uncontrolled hyperglycemia

## 2022-08-16 ENCOUNTER — APPOINTMENT (OUTPATIENT)
Dept: LAB | Facility: IMAGING CENTER | Age: 77
End: 2022-08-16
Payer: COMMERCIAL

## 2022-08-16 ENCOUNTER — HOSPITAL ENCOUNTER (OUTPATIENT)
Dept: RADIOLOGY | Facility: IMAGING CENTER | Age: 77
Discharge: HOME | End: 2022-08-16
Payer: COMMERCIAL

## 2022-08-16 VITALS — HEIGHT: 59 IN | BODY MASS INDEX: 44.35 KG/M2 | WEIGHT: 220 LBS

## 2022-08-16 DIAGNOSIS — E55.9 VITAMIN D DEFICIENCY: ICD-10-CM

## 2022-08-16 DIAGNOSIS — E66.01 MORBID OBESITY WITH BMI OF 40.0-44.9, ADULT (HCC): ICD-10-CM

## 2022-08-16 DIAGNOSIS — E11.69 DIABETES MELLITUS ASSOCIATED WITH HORMONAL ETIOLOGY (HCC): ICD-10-CM

## 2022-08-16 DIAGNOSIS — I10 BENIGN ESSENTIAL HYPERTENSION: ICD-10-CM

## 2022-08-16 LAB
25(OH)D3 SERPL-MCNC: 50.1 NG/ML (ref 30–100)
ALBUMIN SERPL BCP-MCNC: 3.4 G/DL (ref 3.5–5)
ALP SERPL-CCNC: 102 U/L (ref 46–116)
ALT SERPL W P-5'-P-CCNC: 24 U/L (ref 12–78)
ANION GAP SERPL CALCULATED.3IONS-SCNC: 8 MMOL/L (ref 4–13)
AST SERPL W P-5'-P-CCNC: 16 U/L (ref 5–45)
BASOPHILS # BLD AUTO: 0.04 THOUSANDS/ΜL (ref 0–0.1)
BASOPHILS NFR BLD AUTO: 1 % (ref 0–1)
BILIRUB SERPL-MCNC: 1.08 MG/DL (ref 0.2–1)
BUN SERPL-MCNC: 21 MG/DL (ref 5–25)
CALCIUM ALBUM COR SERPL-MCNC: 9.9 MG/DL (ref 8.3–10.1)
CALCIUM SERPL-MCNC: 9.4 MG/DL (ref 8.3–10.1)
CHLORIDE SERPL-SCNC: 108 MMOL/L (ref 96–108)
CO2 SERPL-SCNC: 24 MMOL/L (ref 21–32)
CREAT SERPL-MCNC: 0.97 MG/DL (ref 0.6–1.3)
EOSINOPHIL # BLD AUTO: 0.17 THOUSAND/ΜL (ref 0–0.61)
EOSINOPHIL NFR BLD AUTO: 2 % (ref 0–6)
ERYTHROCYTE [DISTWIDTH] IN BLOOD BY AUTOMATED COUNT: 14.9 % (ref 11.6–15.1)
GFR SERPL CREATININE-BSD FRML MDRD: 57 ML/MIN/1.73SQ M
GLUCOSE P FAST SERPL-MCNC: 153 MG/DL (ref 65–99)
HCT VFR BLD AUTO: 46 % (ref 34.8–46.1)
HGB BLD-MCNC: 14.2 G/DL (ref 11.5–15.4)
IMM GRANULOCYTES # BLD AUTO: 0.03 THOUSAND/UL (ref 0–0.2)
IMM GRANULOCYTES NFR BLD AUTO: 0 % (ref 0–2)
LYMPHOCYTES # BLD AUTO: 2.86 THOUSANDS/ΜL (ref 0.6–4.47)
LYMPHOCYTES NFR BLD AUTO: 33 % (ref 14–44)
MCH RBC QN AUTO: 27.3 PG (ref 26.8–34.3)
MCHC RBC AUTO-ENTMCNC: 30.9 G/DL (ref 31.4–37.4)
MCV RBC AUTO: 89 FL (ref 82–98)
MONOCYTES # BLD AUTO: 0.76 THOUSAND/ΜL (ref 0.17–1.22)
MONOCYTES NFR BLD AUTO: 9 % (ref 4–12)
NEUTROPHILS # BLD AUTO: 4.77 THOUSANDS/ΜL (ref 1.85–7.62)
NEUTS SEG NFR BLD AUTO: 55 % (ref 43–75)
NRBC BLD AUTO-RTO: 0 /100 WBCS
PLATELET # BLD AUTO: 325 THOUSANDS/UL (ref 149–390)
PMV BLD AUTO: 10.9 FL (ref 8.9–12.7)
POTASSIUM SERPL-SCNC: 4 MMOL/L (ref 3.5–5.3)
PROT SERPL-MCNC: 7.4 G/DL (ref 6.4–8.4)
RBC # BLD AUTO: 5.2 MILLION/UL (ref 3.81–5.12)
SODIUM SERPL-SCNC: 140 MMOL/L (ref 135–147)
WBC # BLD AUTO: 8.63 THOUSAND/UL (ref 4.31–10.16)

## 2022-08-16 PROCEDURE — 80053 COMPREHEN METABOLIC PANEL: CPT

## 2022-08-16 PROCEDURE — 36415 COLL VENOUS BLD VENIPUNCTURE: CPT

## 2022-08-16 PROCEDURE — 85025 COMPLETE CBC W/AUTO DIFF WBC: CPT

## 2022-08-16 PROCEDURE — 77067 SCR MAMMO BI INCL CAD: CPT

## 2022-08-16 PROCEDURE — 82306 VITAMIN D 25 HYDROXY: CPT

## 2022-08-16 PROCEDURE — 83036 HEMOGLOBIN GLYCOSYLATED A1C: CPT

## 2022-08-16 PROCEDURE — 77063 BREAST TOMOSYNTHESIS BI: CPT

## 2022-08-17 LAB
EST. AVERAGE GLUCOSE BLD GHB EST-MCNC: 189 MG/DL
HBA1C MFR BLD: 8.2 %

## 2022-08-29 DIAGNOSIS — E11.69 TYPE 2 DIABETES MELLITUS WITH OTHER SPECIFIED COMPLICATION, WITHOUT LONG-TERM CURRENT USE OF INSULIN (HCC): Primary | ICD-10-CM

## 2022-08-29 RX ORDER — SEMAGLUTIDE 2.68 MG/ML
INJECTION, SOLUTION SUBCUTANEOUS
COMMUNITY
Start: 2022-08-18

## 2022-09-07 ENCOUNTER — OFFICE VISIT (OUTPATIENT)
Dept: FAMILY MEDICINE CLINIC | Facility: CLINIC | Age: 77
End: 2022-09-07
Payer: COMMERCIAL

## 2022-09-07 VITALS
BODY MASS INDEX: 44.08 KG/M2 | OXYGEN SATURATION: 98 % | SYSTOLIC BLOOD PRESSURE: 106 MMHG | HEIGHT: 58 IN | HEART RATE: 64 BPM | TEMPERATURE: 98 F | DIASTOLIC BLOOD PRESSURE: 70 MMHG | RESPIRATION RATE: 16 BRPM | WEIGHT: 210 LBS

## 2022-09-07 DIAGNOSIS — N18.31 STAGE 3A CHRONIC KIDNEY DISEASE (HCC): ICD-10-CM

## 2022-09-07 DIAGNOSIS — E11.69 TYPE 2 DIABETES MELLITUS WITH OTHER SPECIFIED COMPLICATION, WITHOUT LONG-TERM CURRENT USE OF INSULIN (HCC): ICD-10-CM

## 2022-09-07 DIAGNOSIS — Z00.00 MEDICARE ANNUAL WELLNESS VISIT, SUBSEQUENT: Primary | ICD-10-CM

## 2022-09-07 DIAGNOSIS — E03.9 ACQUIRED HYPOTHYROIDISM: ICD-10-CM

## 2022-09-07 PROBLEM — R71.8 ELEVATED HEMATOCRIT: Status: ACTIVE | Noted: 2021-09-10

## 2022-09-07 PROBLEM — K59.04 CHRONIC IDIOPATHIC CONSTIPATION: Status: RESOLVED | Noted: 2020-01-13 | Resolved: 2022-09-07

## 2022-09-07 PROBLEM — R05.9 COUGH: Status: RESOLVED | Noted: 2018-12-05 | Resolved: 2022-09-07

## 2022-09-07 PROBLEM — J40 BRONCHITIS: Status: RESOLVED | Noted: 2018-12-05 | Resolved: 2022-09-07

## 2022-09-07 PROBLEM — R74.8 ELEVATED ALKALINE PHOSPHATASE LEVEL: Status: ACTIVE | Noted: 2021-09-10

## 2022-09-07 LAB
CREAT UR-MCNC: 66.4 MG/DL
MICROALBUMIN UR-MCNC: <5 MG/L (ref 0–20)
MICROALBUMIN/CREAT 24H UR: <8 MG/G CREATININE (ref 0–30)

## 2022-09-07 PROCEDURE — 3288F FALL RISK ASSESSMENT DOCD: CPT | Performed by: FAMILY MEDICINE

## 2022-09-07 PROCEDURE — 1170F FXNL STATUS ASSESSED: CPT | Performed by: FAMILY MEDICINE

## 2022-09-07 PROCEDURE — G0439 PPPS, SUBSEQ VISIT: HCPCS | Performed by: FAMILY MEDICINE

## 2022-09-07 PROCEDURE — 99214 OFFICE O/P EST MOD 30 MIN: CPT | Performed by: FAMILY MEDICINE

## 2022-09-07 PROCEDURE — 82043 UR ALBUMIN QUANTITATIVE: CPT | Performed by: FAMILY MEDICINE

## 2022-09-07 PROCEDURE — 1160F RVW MEDS BY RX/DR IN RCRD: CPT | Performed by: FAMILY MEDICINE

## 2022-09-07 PROCEDURE — 1125F AMNT PAIN NOTED PAIN PRSNT: CPT | Performed by: FAMILY MEDICINE

## 2022-09-07 PROCEDURE — 82570 ASSAY OF URINE CREATININE: CPT | Performed by: FAMILY MEDICINE

## 2022-09-07 NOTE — PATIENT INSTRUCTIONS
Medicare Preventive Visit Patient Instructions  Thank you for completing your Welcome to Medicare Visit or Medicare Annual Wellness Visit today  Your next wellness visit will be due in one year (9/8/2023)  The screening/preventive services that you may require over the next 5-10 years are detailed below  Some tests may not apply to you based off risk factors and/or age  Screening tests ordered at today's visit but not completed yet may show as past due  Also, please note that scanned in results may not display below  Preventive Screenings:  Service Recommendations Previous Testing/Comments   Colorectal Cancer Screening  * Colonoscopy    * Fecal Occult Blood Test (FOBT)/Fecal Immunochemical Test (FIT)  * Fecal DNA/Cologuard Test  * Flexible Sigmoidoscopy Age: 39-70 years old   Colonoscopy: every 10 years (may be performed more frequently if at higher risk)  OR  FOBT/FIT: every 1 year  OR  Cologuard: every 3 years  OR  Sigmoidoscopy: every 5 years  Screening may be recommended earlier than age 39 if at higher risk for colorectal cancer  Also, an individualized decision between you and your healthcare provider will decide whether screening between the ages of 74-80 would be appropriate  Colonoscopy: 01/28/2020  FOBT/FIT: Not on file  Cologuard: Not on file  Sigmoidoscopy: Not on file    Screening Current     Breast Cancer Screening Age: 36 years old  Frequency: every 1-2 years  Not required if history of left and right mastectomy Mammogram: 08/16/2022    Screening Current   Cervical Cancer Screening Between the ages of 21-29, pap smear recommended once every 3 years  Between the ages of 33-67, can perform pap smear with HPV co-testing every 5 years     Recommendations may differ for women with a history of total hysterectomy, cervical cancer, or abnormal pap smears in past  Pap Smear: Not on file    Screening Not Indicated   Hepatitis C Screening Once for adults born between 1945 and 1965  More frequently in patients at high risk for Hepatitis C Hep C Antibody: 06/03/2019    Screening Current   Diabetes Screening 1-2 times per year if you're at risk for diabetes or have pre-diabetes Fasting glucose: 153 mg/dL (8/16/2022)  A1C: 8 2 % (8/16/2022)  Screening Not Indicated  History Diabetes   Cholesterol Screening Once every 5 years if you don't have a lipid disorder  May order more often based on risk factors  Lipid panel: 04/12/2022    Screening Not Indicated  History Lipid Disorder     Other Preventive Screenings Covered by Medicare:  1  Abdominal Aortic Aneurysm (AAA) Screening: covered once if your at risk  You're considered to be at risk if you have a family history of AAA  2  Lung Cancer Screening: covers low dose CT scan once per year if you meet all of the following conditions: (1) Age 50-69; (2) No signs or symptoms of lung cancer; (3) Current smoker or have quit smoking within the last 15 years; (4) You have a tobacco smoking history of at least 20 pack years (packs per day multiplied by number of years you smoked); (5) You get a written order from a healthcare provider  3  Glaucoma Screening: covered annually if you're considered high risk: (1) You have diabetes OR (2) Family history of glaucoma OR (3)  aged 48 and older OR (3)  American aged 72 and older  3  Osteoporosis Screening: covered every 2 years if you meet one of the following conditions: (1) You're estrogen deficient and at risk for osteoporosis based off medical history and other findings; (2) Have a vertebral abnormality; (3) On glucocorticoid therapy for more than 3 months; (4) Have primary hyperparathyroidism; (5) On osteoporosis medications and need to assess response to drug therapy  · Last bone density test (DXA Scan): 01/24/2022   5  HIV Screening: covered annually if you're between the age of 15-65  Also covered annually if you are younger than 13 and older than 72 with risk factors for HIV infection   For pregnant patients, it is covered up to 3 times per pregnancy  Immunizations:  Immunization Recommendations   Influenza Vaccine Annual influenza vaccination during flu season is recommended for all persons aged >= 6 months who do not have contraindications   Pneumococcal Vaccine   * Pneumococcal conjugate vaccine = PCV13 (Prevnar 13), PCV15 (Vaxneuvance), PCV20 (Prevnar 20)  * Pneumococcal polysaccharide vaccine = PPSV23 (Pneumovax) Adults 25-60 years old: 1-3 doses may be recommended based on certain risk factors  Adults 72 years old: 1-2 doses may be recommended based off what pneumonia vaccine you previously received   Hepatitis B Vaccine 3 dose series if at intermediate or high risk (ex: diabetes, end stage renal disease, liver disease)   Tetanus (Td) Vaccine - COST NOT COVERED BY MEDICARE PART B Following completion of primary series, a booster dose should be given every 10 years to maintain immunity against tetanus  Td may also be given as tetanus wound prophylaxis  Tdap Vaccine - COST NOT COVERED BY MEDICARE PART B Recommended at least once for all adults  For pregnant patients, recommended with each pregnancy  Shingles Vaccine (Shingrix) - COST NOT COVERED BY MEDICARE PART B  2 shot series recommended in those aged 48 and above     Health Maintenance Due:      Topic Date Due    Breast Cancer Screening: Mammogram  08/16/2023    DXA SCAN  01/24/2024    Colorectal Cancer Screening  01/28/2025    Hepatitis C Screening  Completed     Immunizations Due:      Topic Date Due    COVID-19 Vaccine (3 - Booster for Moderna series) 09/28/2021    Influenza Vaccine (1) 09/01/2022     Advance Directives   What are advance directives? Advance directives are legal documents that state your wishes and plans for medical care  These plans are made ahead of time in case you lose your ability to make decisions for yourself   Advance directives can apply to any medical decision, such as the treatments you want, and if you want to donate organs  What are the types of advance directives? There are many types of advance directives, and each state has rules about how to use them  You may choose a combination of any of the following:  · Living will: This is a written record of the treatment you want  You can also choose which treatments you do not want, which to limit, and which to stop at a certain time  This includes surgery, medicine, IV fluid, and tube feedings  · Durable power of  for healthcare [facility]): This is a written record that states who you want to make healthcare choices for you when you are unable to make them for yourself  This person, called a proxy, is usually a family member or a friend  You may choose more than 1 proxy  · Do not resuscitate (DNR) order:  A DNR order is used in case your heart stops beating or you stop breathing  It is a request not to have certain forms of treatment, such as CPR  A DNR order may be included in other types of advance directives  · Medical directive: This covers the care that you want if you are in a coma, near death, or unable to make decisions for yourself  You can list the treatments you want for each condition  Treatment may include pain medicine, surgery, blood transfusions, dialysis, IV or tube feedings, and a ventilator (breathing machine)  · Values history: This document has questions about your views, beliefs, and how you feel and think about life  This information can help others choose the care that you would choose  Why are advance directives important? An advance directive helps you control your care  Although spoken wishes may be used, it is better to have your wishes written down  Spoken wishes can be misunderstood, or not followed  Treatments may be given even if you do not want them  An advance directive may make it easier for your family to make difficult choices about your care     Weight Management   Why it is important to manage your weight:  Being overweight increases your risk of health conditions such as heart disease, high blood pressure, type 2 diabetes, and certain types of cancer  It can also increase your risk for osteoarthritis, sleep apnea, and other respiratory problems  Aim for a slow, steady weight loss  Even a small amount of weight loss can lower your risk of health problems  How to lose weight safely:  A safe and healthy way to lose weight is to eat fewer calories and get regular exercise  You can lose up about 1 pound a week by decreasing the number of calories you eat by 500 calories each day  Healthy meal plan for weight management:  A healthy meal plan includes a variety of foods, contains fewer calories, and helps you stay healthy  A healthy meal plan includes the following:  · Eat whole-grain foods more often  A healthy meal plan should contain fiber  Fiber is the part of grains, fruits, and vegetables that is not broken down by your body  Whole-grain foods are healthy and provide extra fiber in your diet  Some examples of whole-grain foods are whole-wheat breads and pastas, oatmeal, brown rice, and bulgur  · Eat a variety of vegetables every day  Include dark, leafy greens such as spinach, kale, món[place] greens, and mustard greens  Eat yellow and orange vegetables such as carrots, sweet potatoes, and winter squash  · Eat a variety of fruits every day  Choose fresh or canned fruit (canned in its own juice or light syrup) instead of juice  Fruit juice has very little or no fiber  · Eat low-fat dairy foods  Drink fat-free (skim) milk or 1% milk  Eat fat-free yogurt and low-fat cottage cheese  Try low-fat cheeses such as mozzarella and other reduced-fat cheeses  · Choose meat and other protein foods that are low in fat  Choose beans or other legumes such as split peas or lentils  Choose fish, skinless poultry (chicken or turkey), or lean cuts of red meat (beef or pork)  Before you cook meat or poultry, cut off any visible fat  · Use less fat and oil  Try baking foods instead of frying them  Add less fat, such as margarine, sour cream, regular salad dressing and mayonnaise to foods  Eat fewer high-fat foods  Some examples of high-fat foods include french fries, doughnuts, ice cream, and cakes  · Eat fewer sweets  Limit foods and drinks that are high in sugar  This includes candy, cookies, regular soda, and sweetened drinks  Exercise:  Exercise at least 30 minutes per day on most days of the week  Some examples of exercise include walking, biking, dancing, and swimming  You can also fit in more physical activity by taking the stairs instead of the elevator or parking farther away from stores  Ask your healthcare provider about the best exercise plan for you  © Copyright [employer] 2018 Information is for End User's use only and may not be sold, redistributed or otherwise used for commercial purposes   All illustrations and images included in CareNotes® are the copyrighted property of [employer]  or [address] [place] [address]

## 2022-09-07 NOTE — PROGRESS NOTES
Assessment and Plan:     Problem List Items Addressed This Visit        Endocrine    Hypothyroidism    Relevant Orders    TSH, 3rd generation       Other    Medicare annual wellness visit, subsequent - Primary      Other Visit Diagnoses     Type 2 diabetes mellitus with other specified complication, without long-term current use of insulin ([place] [address] )        Relevant Orders    Microalbumin / creatinine urine ratio (Completed)    Lipid panel    Stage 3a chronic kidney disease ([place] [address] )               Preventive health issues were discussed with patient, and age appropriate screening tests were ordered as noted in patient's After Visit Summary  Personalized health advice and appropriate referrals for health education or preventive services given if needed, as noted in patient's After Visit Summary  History of Present Illness:     Patient presents for a Medicare Wellness Visit    HPI Patient is here to follow Diabetes and HTN, patient states good compliance with treatment  Denies chest pain, shortness of breath, angina, urinary problems  No exercise but follows low salt and low carbohydrates diet        calcium carbonate-vitamin D  Diclofenac Sodium  Empagliflozin Tabs  enalapril  ergocalciferol  glimepiride  levothyroxine  lovastatin  Ozempic (2 MG/DOSE) Sopn  Shingrix Susr  VITAMIN D PO         Patient Care Team:  Vidya Tian MD as PCP - General (Family Medicine)     Review of Systems:     Review of Systems     Problem List:     Patient Active Problem List   Diagnosis    Diabetes mellitus type 2, controlled ([place] [address] )    Hypothyroidism    Hypercalcemia    Primary osteoarthritis of right knee    Viral upper respiratory tract infection    Localized edema    Esophageal stricture    Venous (peripheral) insufficiency    Benign essential HTN    Hyperlipidemia    Morbid obesity ([facility] )    Acute blood loss anemia    Encounter for support and coordination of transition of care    Gastroesophageal reflux disease without esophagitis    History of colon polyps    Uncontrolled type 2 diabetes mellitus with hyperglycemia ([facility] )    Medicare annual wellness visit, subsequent    Achalasia    Osteopenia of multiple sites    Needs flu shot    Alkaline phosphatase elevation    Intraductal papillary mucinous adenoma of pancreas    Elevated alkaline phosphatase level    Elevated hematocrit      Past Medical and Surgical History:     Past Medical History:   Diagnosis Date    Arthritis     Colon polyp     Diabetes ([facility] )     Diabetes mellitus ([place] [address] )     Endometrial ca ([place] [address] )     GERD (gastroesophageal reflux disease)     Hard of hearing     has hearing aids but doesnt wear them    Hiatal hernia     History of total right knee replacement     Hyperlipidemia     Hypertension     Muscle weakness     Obesity     Refusal of blood product     Risk for falls     Stress incontinence     Swelling of both lower extremities     Thyroid disease     hypo    Tinnitus     Use of cane as ambulatory aid     Wears glasses      Past Surgical History:   Procedure Laterality Date    CATARACT EXTRACTION Bilateral      SECTION      x2    COLONOSCOPY  02/15/2016    small polyp, repeat 5yrs    EGD      EYE SURGERY      HYSTERECTOMY      age 48    JOINT REPLACEMENT Right     knee    OOPHORECTOMY      both removed age 48    DC LAP, ESOPHAGOMYOTOMY W FUNDOPLASTY N/A 2020    Procedure: LAPAROSCOPIC HELLER MYOTOMY WITH TOUPET FUNDOPLICATION  W ROBOTICS;  Surgeon: Marcella Angeles MD;  Location: [facility];  Service: General    DC TOTAL KNEE ARTHROPLASTY Right 2019    Procedure: ARTHROPLASTY KNEE TOTAL;  Surgeon: Chery Shaw DO;  Location[facility];  Service: Orthopedics    UPPER GASTROINTESTINAL ENDOSCOPY      WISDOM TOOTH EXTRACTION        Family History:     Family History   Problem Relation Age of Onset   Agnieszka Nine Breast cancer Mother [de-identified]    Diabetes Father     Hypertension Father     Breast cancer Sister 46  No Known Problems Sister     Endometrial cancer Daughter 52    No Known Problems Maternal Grandmother     Cancer Maternal Grandfather         melanoma    No Known Problems Paternal Grandmother     No Known Problems Paternal Grandfather     No Known Problems Maternal Aunt     No Known Problems Maternal Aunt     No Known Problems Maternal Aunt     No Known Problems Paternal Aunt     No Known Problems Paternal Aunt     No Known Problems Paternal Aunt     No Known Problems Paternal Aunt       Social History:     Social History     Socioeconomic History    Marital status:      Spouse name: None    Number of children: None    Years of education: None    Highest education level: None   Occupational History    None   Tobacco Use    Smoking status: Former Smoker     Quit date: 10/28/1971     Years since quittin 9    Smokeless tobacco: Never Used   Vaping Use    Vaping Use: Never used   Substance and Sexual Activity    Alcohol use: Never    Drug use: No    Sexual activity: None   Other Topics Concern    None   Social History Narrative    None     Social Determinants of Health     Financial Resource Strain: Low Risk     Difficulty of Paying Living Expenses: Not very hard   Food Insecurity: Not on file   Transportation Needs: No Transportation Needs    Lack of Transportation (Medical): No    Lack of Transportation (Non-Medical):  No   Physical Activity: Not on file   Stress: Not on file   Social Connections: Not on file   Intimate Partner Violence: Not on file   Housing Stability: Not on file      Medications and Allergies:     Current Outpatient Medications   Medication Sig Dispense Refill    calcium carbonate-vitamin D (OSCAL-D) 500 mg-200 units per tablet Take 1 tablet by mouth 2 (two) times a day with meals      Diclofenac Sodium (VOLTAREN) 1 % Apply 2 g topically 4 (four) times a day 100 g 5    Empagliflozin (Jardiance) 25 MG TABS Take 1 tablet (25 mg total) by mouth every morning 90 tablet 3    enalapril (VASOTEC) 2 5 mg tablet Take 2 5 mg by mouth every evening   1    ergocalciferol (ERGOCALCIFEROL) 1 25 MG (00973 UT) capsule Take 1 cap every OTHER week with dinner   glimepiride (AMARYL) 1 mg tablet TAKE ONE TABLET (1 MG TOTAL) BY MOUTH EVERY MORNING BEFORE BREAKFAST   levothyroxine 50 mcg tablet Take 50 mcg by mouth daily   1    levothyroxine 75 mcg tablet Take 75 mcg by mouth daily      lovastatin (MEVACOR) 40 MG tablet Take 40 mg by mouth daily at bedtime  3    Ozempic, 2 MG/DOSE, 8 MG/3ML SOPN INJECT 2 MG UNDER THE SKIN ONCE A WEEK   VITAMIN D PO Take 50,000 Units by mouth every 14 (fourteen) days      Zoster Vac Recomb Adjuvanted (Shingrix) 50 MCG/0 5ML SUSR Inject 0 5 mL into a muscle       No current facility-administered medications for this visit  No Known Allergies   Immunizations:     Immunization History   Administered Date(s) Administered    COVID-19 MODERNA VACC 0 5 ML IM 03/26/2021, 04/28/2021    INFLUENZA 01/10/2013, 10/30/2015, 11/01/2016, 10/02/2017, 11/26/2018    Influenza Split 11/19/2013, 12/18/2014    Influenza Split High Dose Preservative Free IM 10/02/2017    Influenza, high dose seasonal 0 7 mL 11/26/2018, 11/11/2019, 12/09/2021    Pneumococcal Conjugate 13-Valent 01/22/2020    Pneumococcal Polysaccharide PPV23 12/01/2014    Zoster Vaccine Recombinant 09/10/2021, 04/14/2022      Health Maintenance:         Topic Date Due    Breast Cancer Screening: Mammogram  08/16/2023    DXA SCAN  01/24/2024    Colorectal Cancer Screening  01/28/2025    Hepatitis C Screening  Completed         Topic Date Due    COVID-19 Vaccine (3 - Booster for Moderna series) 09/28/2021    Influenza Vaccine (1) 09/01/2022      Medicare Screening Tests and Risk Assessments:     Marlyn Joyner is here for her Subsequent Wellness visit  Last Medicare Wellness visit information reviewed, patient interviewed and updates made to the record today        Health Risk Assessment:   Patient rates overall health as fair  Patient feels that their physical health rating is same  Patient is satisfied with their life  Eyesight was rated as same  Hearing was rated as slightly worse  Patient feels that their emotional and mental health rating is same  Patients states they are never, rarely angry  Patient states they are sometimes unusually tired/fatigued  Pain experienced in the last 7 days has been some  Patient's pain rating has been 2/10  Patient states that she has experienced no weight loss or gain in last 6 months  Fall Risk Screening: In the past year, patient has experienced: no history of falling in past year      Urinary Incontinence Screening:   Patient has not leaked urine accidently in the last six months  Home Safety:  Patient has trouble with stairs inside or outside of their home  Patient has working smoke alarms and has working carbon monoxide detector  Home safety hazards include: none  Nutrition:   Current diet is Diabetic  Medications:   Patient is currently taking over-the-counter supplements  OTC medications include: see medication list  Patient is able to manage medications  Activities of Daily Living (ADLs)/Instrumental Activities of Daily Living (IADLs):   Walk and transfer into and out of bed and chair?: Yes  Dress and groom yourself?: Yes    Bathe or shower yourself?: Yes    Feed yourself?  Yes  Do your laundry/housekeeping?: Yes  Manage your money, pay your bills and track your expenses?: Yes  Make your own meals?: Yes    Do your own shopping?: Yes    Previous Hospitalizations:   Any hospitalizations or ED visits within the last 12 months?: Yes    How many hospitalizations have you had in the last year?: 1-2    Advance Care Planning:   Living will: No    Durable POA for healthcare: No    Advanced directive: No    Advanced directive counseling given: Yes    Five wishes given: Yes    Patient declined ACP directive: No    End of Life Decisions reviewed with patient: Yes    Provider agrees with end of life decisions: Yes      Cognitive Screening:   Provider or family/friend/caregiver concerned regarding cognition?: No    PREVENTIVE SCREENINGS      Cardiovascular Screening:    General: Screening Not Indicated and History Lipid Disorder    Due for: Lipid Panel      Diabetes Screening:     General: Screening Not Indicated and History Diabetes      Colorectal Cancer Screening:     General: Screening Current    Due for: FOBT/FIT      Breast Cancer Screening:     General: Screening Current      Cervical Cancer Screening:    General: Screening Not Indicated      Osteoporosis Screening:    General: Screening Current    Due for: DXA Axial      Abdominal Aortic Aneurysm (AAA) Screening:        General: Screening Not Indicated      Lung Cancer Screening:     General: Screening Not Indicated      Hepatitis C Screening:    General: Screening Current    Hep C Screening Accepted: Yes      Screening, Brief Intervention, and Referral to Treatment (SBIRT)    Screening  Typical number of drinks in a day: 0  Typical number of drinks in a week: 0  Interpretation: Low risk drinking behavior  Single Item Drug Screening:  How often have you used an illegal drug (including marijuana) or a prescription medication for non-medical reasons in the past year? never    Single Item Drug Screen Score: 0  Interpretation: Negative screen for possible drug use disorder    Other Counseling Topics:   Alcohol use counseling, car/seat belt/driving safety, skin self-exam, sunscreen and calcium and vitamin D intake and regular weightbearing exercise       No exam data present     Physical Exam:     /70 (BP Location: Left arm, Patient Position: Sitting, Cuff Size: Standard)   Pulse 64   Temp 98 °F (36 7 °C) (Temporal)   Resp 16   Ht 4' 10" (1 473 m)   Wt 95 3 kg (210 lb)   SpO2 98%   Breastfeeding No   BMI 43 89 kg/m²     Physical Exam  Cardiovascular:      Pulses: Pulses are weak  Dorsalis pedis pulses are 1+ on the right side and 1+ on the left side  Posterior tibial pulses are 1+ on the right side and 1+ on the left side  Musculoskeletal:        Feet:    Feet:      Right foot:      Skin integrity: Dry skin present  Left foot:      Skin integrity: Dry skin present  Patient's shoes and socks removed  Right Foot/Ankle   Right Foot Inspection  Skin Exam: dry skin  Toe Exam: ROM and strength within normal limits  Sensory   Monofilament testing: intact    Vascular  Capillary refills: < 3 seconds  The right DP pulse is 1+  The right PT pulse is 1+  Left Foot/Ankle  Left Foot Inspection  Skin Exam: dry skin  Toe Exam: ROM and strength within normal limits  Sensory   Monofilament testing: intact    Vascular  Capillary refills: < 3 seconds  The left DP pulse is 1+  The left PT pulse is 1+       Assign Risk Category  No deformity present  No loss of protective sensation  Weak pulses  Risk: 0        Amada Abad MD

## 2022-11-01 ENCOUNTER — VBI (OUTPATIENT)
Dept: ADMINISTRATIVE | Facility: OTHER | Age: 77
End: 2022-11-01

## 2022-12-08 NOTE — H&P (VIEW-ONLY)
Final H&P - BARIATRIC SURGERY  Marquita Islas 70 y o  female MRN: [id number]  Unit/Bed#:  Encounter: [id number]      HPI:  Marquita Islas is a 70 y o  female referred by GI for achalasia  Subjective     The patient is referred to us by Dr Kaela Sherman  Duration of symptoms: >2y  Main symptoms: Progressive dysphagia to solids first and now to liquids, with chest pain, shortness of breath  The patient does not vomit but she does experience reflux and heartburn  The patient also c/o intermittent diarrhea and constipation starting around the time that dysphagia to liquids started  The patient has noticed approx 10 lbs weight loss in 6 months  Current treatment: Prilosec 20mg qd    The patient denies any cardiac issues or any recent workup  Surgical history only significant for   Review of Systems   Constitutional: Negative  HENT: Negative  Eyes: Negative  Respiratory: Negative  Cardiovascular: Negative  Gastrointestinal: Positive for abdominal distention, nausea and vomiting  Endocrine: Negative  Genitourinary: Negative  Musculoskeletal: Negative  Skin: Negative  Allergic/Immunologic: Negative  Neurological: Negative  Hematological: Negative  Psychiatric/Behavioral: Negative          Historical Information   Past Medical History:   Diagnosis Date    Arthritis     Colon polyp     Diabetes [facility] [address] [facility]     [facility]     GERD (gastroesophageal reflux disease)     Hard of hearing     Hyperlipidemia     Hypertension     Obesity     Stress incontinence     Swelling of both lower extremities     Thyroid disease     hypo    Use of cane as ambulatory aid     Wears glasses      Past Surgical History:   Procedure Laterality Date    CATARACT EXTRACTION Bilateral      SECTION      COLONOSCOPY  02/15/2016    small polyp, repeat 5yrs    EGD      HYSTERECTOMY      age 48    JOINT REPLACEMENT Right     knee    OOPHORECTOMY      both removed age 48 Name of Medication (s):  tiZANidine (ZANAFLEX) 4 MG tablet [[id number]]       Dosage of Medication:  4MG Tablet    Taking medication as prescribed? Yes    Verified there are no refills at the pharmacy: Yes    Offered [facility]: Yes If patient declines, list barrier:     Preferred Pharmacy verified Yes    Best phone number you can be reached at:  [phone]    If we get your voicemail, may we leave a detailed message? Yes    Date of Last Follow Up Visit:  10/18/2022    Date of Next Follow Up Visit:  01/26/2023      Patient informed that refills can take up to one week.      *Patient is stating that he is out of the medication and the next refill is not due to be filled until 12/12/2022, patient is wanting to know if the medication can be filled early due to the amount of pain that he is in.  Please call patient and advise.      [place] TOTAL KNEE ARTHROPLASTY Right 2019    Procedure: ARTHROPLASTY KNEE TOTAL;  Surgeon: Darshan Ruvalcaba DO;  Location: [facility];  Service: Orthopedics    UPPER GASTROINTESTINAL ENDOSCOPY       Social History   Social History     Substance and Sexual Activity   Alcohol Use Never    Frequency: Never     Social History     Substance and Sexual Activity   Drug Use No     Social History     Tobacco Use   Smoking Status Former Smoker    Last attempt to quit: 10/28/1971    Years since quittin 7   Smokeless Tobacco Never Used       Objective       Current Vitals:   Blood Pressure: 120/62 (20)  Pulse: 73 (20)  Temperature: 97 8 °F (36 6 °C) (20)  Temp Source: Temporal (20)  Height: 4' 11" (149 9 cm) (20)  Weight - Scale: 83 9 kg (185 lb) (20)    Invasive Devices     None                 Physical Exam   Constitutional: She is oriented to person, place, and time  She appears well-developed and well-nourished  HENT:   Head: Normocephalic and atraumatic  Nose: Nose normal    Eyes: Conjunctivae and EOM are normal    Neck: Normal range of motion  Cardiovascular: Normal rate  Pulmonary/Chest: Effort normal    Abdominal: Soft  Bowel sounds are normal  She exhibits no distension and no mass  There is no tenderness  There is no rebound and no guarding  No hernia  The abdomen is scaphoid and benign  Well healed  Pfannenstiel incision  Musculoskeletal: Normal range of motion  Neurological: She is alert and oriented to person, place, and time  Skin: Skin is warm  Psychiatric: She has a normal mood and affect   Her behavior is normal  Judgment and thought content normal          Assessment/PLAN:    Kirstin Felix is a 70 y o  female referred to us by GI for type 2 achalasia     ---------------------------------------------------    The patient's workup thus far was reviewed, and includes:    Barium swallow (5/30/19)    IMPRESSION:     Today's findings seem to match the description of the prior study from 2012 with a short segment area of significant narrowing at the gastroesophageal junction causing significant delay of transit of barium from the esophagus to the stomach  There is   esophageal dysmotility  I do not see any definitive evidence of mass or ulceration  A repeat upper endoscopy may be helpful for more definitive assessment of this area  Correlate clinically for the need for possible balloon dilatation  CT Chest (6/26/2020)    IMPRESSION:     Dilated fluid and debris-filled esophagus to the level of the gastroesophageal junction compatible with the history of achalasia with nothing to suggest malignancy  EGD - (1/28/2020 [facility])    FINDINGS:  · Mild eroded and erythematous mucosa in the esophagus  · Large amount of fluid was suctioned from esophagus  Moderately dilated distal and mid esophagus  GE junction appeared to be slightly tight while passing the scope otherwise no apparent stricture or mass in this area  This finding is concerning for  achalasia  · The stomach and duodenum appeared normal  · Performed biopsies in the upper third of the esophagus and lower third of the esophagus    IMPRESSION:  Fluid filled esophagus  Moderately dilated mid and distal esophagus  Mild esophagitis at the GE junction  Slightly tight GE junction noted  No mass or stricture at the GE junction  Normal stomach and duodenum  Random biopsy taken from esophagus  Pathology    Final Diagnosis   A  Esophagus, distal, biopsy:  -  Benign squamous mucosa with mild reactive change and mixed inflammation, cannot exclude mild esophagitis  -  Intraepithelial eosinophils are not increased, negative for features of eosinophilic esophagitis  -  Negative for dysplasia or carcinoma      B    Esophagus, proximal, biopsy:  -  Benign squamous mucosa with mild reactive change and mixed inflammation, cannot exclude mild esophagitis  -  Intraepithelial eosinophils are not increased, negative for features of eosinophilic esophagitis  -  Negative for dysplasia or carcinoma  MANOMETRY    Esophageal manometry  Esophageal motility- all 10 swallows demonstrated abnormal esophageal contractibility pattern consistent with pan esophageal contraction/pressurization  Mean DCI is 870 mmHg  s cm  LES- median IRP is 20 mmHg which is elevated  Impedance-10% complete clearance of liquid bolus swallows     [place] classification- findings concerning for type 2 achalasia    --------------------------------------------------------------------  Her workup is complete  Patient denies any cardiac history  EKG reviewed and MARCIO Tran was reviewed with anesthesia and we will proceed  The patient was consented for a robotic Heller myotomy with Toupet fundoplication                Kira Mc MD  Bariatric Surgery Fellow  7/1/2020  10:54 AM

## 2023-01-22 NOTE — TELEPHONE ENCOUNTER
LM on patient's vm to call me to confirm surgery asap   I do not know status of surgery, she has no new preop consult with family doctor, no return call from son in law regarding dental 
Patient returned my call and is cleared by family doctor and dentist-do not have that form yet   Ok to proceed,
Thanks Wandy Daugherty!
22-Jan-2023 23:15

## 2023-03-02 ENCOUNTER — LAB (OUTPATIENT)
Dept: LAB | Facility: IMAGING CENTER | Age: 78
End: 2023-03-02

## 2023-03-02 DIAGNOSIS — E06.3 HYPERTHYROIDISM WITH HASHIMOTO DISEASE: ICD-10-CM

## 2023-03-02 DIAGNOSIS — E03.9 ACQUIRED HYPOTHYROIDISM: ICD-10-CM

## 2023-03-02 DIAGNOSIS — E06.3 HYPOTHYROIDISM DUE TO HASHIMOTO'S THYROIDITIS: ICD-10-CM

## 2023-03-02 DIAGNOSIS — E78.5 HYPERLIPIDEMIA, UNSPECIFIED HYPERLIPIDEMIA TYPE: ICD-10-CM

## 2023-03-02 DIAGNOSIS — E11.69 DIABETES MELLITUS ASSOCIATED WITH HORMONAL ETIOLOGY (HCC): ICD-10-CM

## 2023-03-02 DIAGNOSIS — E05.80 HYPERTHYROIDISM WITH HASHIMOTO DISEASE: ICD-10-CM

## 2023-03-02 DIAGNOSIS — N18.2 CKD (CHRONIC KIDNEY DISEASE) STAGE 2, GFR 60-89 ML/MIN: ICD-10-CM

## 2023-03-02 DIAGNOSIS — E11.69 TYPE 2 DIABETES MELLITUS WITH OTHER SPECIFIED COMPLICATION, WITHOUT LONG-TERM CURRENT USE OF INSULIN (HCC): ICD-10-CM

## 2023-03-02 DIAGNOSIS — E03.8 HYPOTHYROIDISM DUE TO HASHIMOTO'S THYROIDITIS: ICD-10-CM

## 2023-03-02 LAB
ALBUMIN SERPL BCP-MCNC: 3.5 G/DL (ref 3.5–5)
ALP SERPL-CCNC: 110 U/L (ref 46–116)
ALT SERPL W P-5'-P-CCNC: 23 U/L (ref 12–78)
ANION GAP SERPL CALCULATED.3IONS-SCNC: 5 MMOL/L (ref 4–13)
AST SERPL W P-5'-P-CCNC: 16 U/L (ref 5–45)
BILIRUB SERPL-MCNC: 0.79 MG/DL (ref 0.2–1)
BUN SERPL-MCNC: 21 MG/DL (ref 5–25)
CALCIUM SERPL-MCNC: 10.3 MG/DL (ref 8.3–10.1)
CHLORIDE SERPL-SCNC: 104 MMOL/L (ref 96–108)
CHOLEST SERPL-MCNC: 183 MG/DL
CO2 SERPL-SCNC: 27 MMOL/L (ref 21–32)
CREAT SERPL-MCNC: 0.84 MG/DL (ref 0.6–1.3)
GFR SERPL CREATININE-BSD FRML MDRD: 68 ML/MIN/1.73SQ M
GLUCOSE P FAST SERPL-MCNC: 220 MG/DL (ref 65–99)
HDLC SERPL-MCNC: 49 MG/DL
LDLC SERPL CALC-MCNC: 88 MG/DL (ref 0–100)
NONHDLC SERPL-MCNC: 134 MG/DL
POTASSIUM SERPL-SCNC: 4.1 MMOL/L (ref 3.5–5.3)
PROT SERPL-MCNC: 6.9 G/DL (ref 6.4–8.4)
SODIUM SERPL-SCNC: 136 MMOL/L (ref 135–147)
T4 FREE SERPL-MCNC: 1.2 NG/DL (ref 0.76–1.46)
TRIGL SERPL-MCNC: 228 MG/DL
TSH SERPL DL<=0.05 MIU/L-ACNC: 2.64 UIU/ML (ref 0.45–4.5)

## 2023-03-03 LAB
EST. AVERAGE GLUCOSE BLD GHB EST-MCNC: 229 MG/DL
HBA1C MFR BLD: 9.6 %

## 2023-03-04 NOTE — RESULT ENCOUNTER NOTE
Please call patient, blood sugar and cholesterol are higher than previous test   She prefers recommendation from endocrinologist   Please tell the patient to call endocrinologist Dr Chace Lenug but just medications or give other recommendations  She can benefit from more exercise and diabetes education

## 2023-03-06 ENCOUNTER — OFFICE VISIT (OUTPATIENT)
Dept: FAMILY MEDICINE CLINIC | Facility: CLINIC | Age: 78
End: 2023-03-06

## 2023-03-06 VITALS
HEART RATE: 78 BPM | RESPIRATION RATE: 20 BRPM | DIASTOLIC BLOOD PRESSURE: 62 MMHG | BODY MASS INDEX: 43.07 KG/M2 | SYSTOLIC BLOOD PRESSURE: 118 MMHG | HEIGHT: 58 IN | TEMPERATURE: 97.7 F | OXYGEN SATURATION: 97 % | WEIGHT: 205.2 LBS

## 2023-03-06 DIAGNOSIS — E03.9 ACQUIRED HYPOTHYROIDISM: ICD-10-CM

## 2023-03-06 DIAGNOSIS — E66.2 CLASS 3 OBESITY WITH ALVEOLAR HYPOVENTILATION, SERIOUS COMORBIDITY, AND BODY MASS INDEX (BMI) OF 40.0 TO 44.9 IN ADULT (HCC): ICD-10-CM

## 2023-03-06 DIAGNOSIS — N18.2 STAGE 2 CHRONIC KIDNEY DISEASE: ICD-10-CM

## 2023-03-06 DIAGNOSIS — E83.52 HYPERCALCEMIA: ICD-10-CM

## 2023-03-06 DIAGNOSIS — R30.9 PAINFUL URINATION: Primary | ICD-10-CM

## 2023-03-06 DIAGNOSIS — B37.49 YEAST UTI: ICD-10-CM

## 2023-03-06 DIAGNOSIS — E66.01 MORBID OBESITY WITH BMI OF 40.0-44.9, ADULT (HCC): ICD-10-CM

## 2023-03-06 DIAGNOSIS — K21.9 GASTROESOPHAGEAL REFLUX DISEASE WITHOUT ESOPHAGITIS: ICD-10-CM

## 2023-03-06 DIAGNOSIS — E11.69 TYPE 2 DIABETES MELLITUS WITH OTHER SPECIFIED COMPLICATION, WITHOUT LONG-TERM CURRENT USE OF INSULIN (HCC): ICD-10-CM

## 2023-03-06 DIAGNOSIS — E11.9 TYPE 2 DIABETES MELLITUS WITHOUT COMPLICATION, WITHOUT LONG-TERM CURRENT USE OF INSULIN (HCC): ICD-10-CM

## 2023-03-06 PROBLEM — J06.9 VIRAL UPPER RESPIRATORY TRACT INFECTION: Status: RESOLVED | Noted: 2019-05-28 | Resolved: 2023-03-06

## 2023-03-06 PROBLEM — Z00.00 MEDICARE ANNUAL WELLNESS VISIT, SUBSEQUENT: Status: RESOLVED | Noted: 2020-01-22 | Resolved: 2023-03-06

## 2023-03-06 PROBLEM — R60.0 LOCALIZED EDEMA: Status: RESOLVED | Noted: 2019-05-28 | Resolved: 2023-03-06

## 2023-03-06 PROBLEM — R71.8 ELEVATED HEMATOCRIT: Status: RESOLVED | Noted: 2021-09-10 | Resolved: 2023-03-06

## 2023-03-06 PROBLEM — R74.8 ELEVATED ALKALINE PHOSPHATASE LEVEL: Status: RESOLVED | Noted: 2021-09-10 | Resolved: 2023-03-06

## 2023-03-06 PROBLEM — E11.65 UNCONTROLLED TYPE 2 DIABETES MELLITUS WITH HYPERGLYCEMIA (HCC): Status: RESOLVED | Noted: 2020-01-22 | Resolved: 2023-03-06

## 2023-03-06 PROBLEM — E66.813 CLASS 3 OBESITY WITH ALVEOLAR HYPOVENTILATION, SERIOUS COMORBIDITY, AND BODY MASS INDEX (BMI) OF 40.0 TO 44.9 IN ADULT (HCC): Status: ACTIVE | Noted: 2021-05-14

## 2023-03-06 PROBLEM — D62 ACUTE BLOOD LOSS ANEMIA: Status: RESOLVED | Noted: 2019-11-28 | Resolved: 2023-03-06

## 2023-03-06 PROBLEM — Z76.89 ENCOUNTER FOR SUPPORT AND COORDINATION OF TRANSITION OF CARE: Status: RESOLVED | Noted: 2019-12-09 | Resolved: 2023-03-06

## 2023-03-06 PROBLEM — N18.31 STAGE 3A CHRONIC KIDNEY DISEASE (HCC): Status: ACTIVE | Noted: 2023-03-06

## 2023-03-06 PROBLEM — Z23 NEEDS FLU SHOT: Status: RESOLVED | Noted: 2021-12-19 | Resolved: 2023-03-06

## 2023-03-06 LAB
BACTERIA UR QL AUTO: ABNORMAL /HPF
BILIRUB UR QL STRIP: NEGATIVE
BUDDING YEAST: PRESENT
CLARITY UR: ABNORMAL
COLOR UR: YELLOW
GLUCOSE UR STRIP-MCNC: ABNORMAL MG/DL
HGB UR QL STRIP.AUTO: ABNORMAL
HYALINE CASTS #/AREA URNS LPF: ABNORMAL /LPF
KETONES UR STRIP-MCNC: NEGATIVE MG/DL
LEUKOCYTE ESTERASE UR QL STRIP: ABNORMAL
NITRITE UR QL STRIP: NEGATIVE
NON-SQ EPI CELLS URNS QL MICRO: ABNORMAL /HPF
PH UR STRIP.AUTO: 5.5 [PH]
PROT UR STRIP-MCNC: ABNORMAL MG/DL
RBC #/AREA URNS AUTO: ABNORMAL /HPF
SL AMB  POCT GLUCOSE, UA: ABNORMAL
SL AMB LEUKOCYTE ESTERASE,UA: ABNORMAL
SL AMB POCT BILIRUBIN,UA: ABNORMAL
SL AMB POCT BLOOD,UA: ABNORMAL
SL AMB POCT CLARITY,UA: ABNORMAL
SL AMB POCT COLOR,UA: YELLOW
SL AMB POCT KETONES,UA: ABNORMAL
SL AMB POCT NITRITE,UA: ABNORMAL
SL AMB POCT PH,UA: 5
SL AMB POCT SPECIFIC GRAVITY,UA: 1.01
SL AMB POCT URINE PROTEIN: ABNORMAL
SL AMB POCT UROBILINOGEN: 0.2
SP GR UR STRIP.AUTO: 1.02 (ref 1–1.03)
UROBILINOGEN UR STRIP-ACNC: <2 MG/DL
WBC #/AREA URNS AUTO: ABNORMAL /HPF

## 2023-03-06 RX ORDER — NITROFURANTOIN 25; 75 MG/1; MG/1
100 CAPSULE ORAL 2 TIMES DAILY
Qty: 10 CAPSULE | Refills: 0 | Status: SHIPPED | OUTPATIENT
Start: 2023-03-06 | End: 2023-03-09 | Stop reason: ALTCHOICE

## 2023-03-06 RX ORDER — MAGNESIUM HYDROXIDE/ALUMINUM HYDROXICE/SIMETHICONE 120; 1200; 1200 MG/30ML; MG/30ML; MG/30ML
30 SUSPENSION ORAL EVERY 4 HOURS PRN
Qty: 710 ML | Refills: 0 | Status: SHIPPED | OUTPATIENT
Start: 2023-03-06

## 2023-03-06 NOTE — ASSESSMENT & PLAN NOTE
Uncontrolled  Metformin caused diarrhea  Stopped jardiance and glimepiride about 2 months, noncompliance and self-discontinued due to concern for kidney function  Continue Ozempic 2mg once weekly  Recommend restart Jardiance same dose  Glimepiride previously on higher dose  Highly encouraged compliance with medication  Has appointment with endo tomorrow, discuss with endo medication management  Continue enalapril and lovastatin     Lab Results   Component Value Date    HGBA1C 9 6 (H) 03/02/2023

## 2023-03-06 NOTE — PROGRESS NOTES
Name: Jessica Ratliff      : 1945      MRN: [id number]  Encounter Provider: Kristan Morales PA-C  Encounter Date: 3/6/2023   Encounter department: [facility]     1  Painful urination  Comments:  Suspect due to acute UTI, with positive leuks and blood on dip, sent for urine culture, start empiric Macrobid, was taking amoxicillin at home advised against   Orders:  -     POCT urine dip  -     UA w Reflex to Microscopic w Reflex to Culture - Clinic Collect  -     nitrofurantoin (MACROBID) 100 mg capsule; Take 1 capsule (100 mg total) by mouth 2 (two) times a day for 5 days  -     Urine Microscopic  -     Urine culture    2  Type 2 diabetes mellitus with other specified complication, without long-term current use of insulin ([facility])  -     Empagliflozin (Jardiance) 25 MG TABS; Take 1 tablet (25 mg total) by mouth every morning    3  Hypercalcemia  Assessment & Plan:  Lab Results   Component Value Date    CALCIUM 10 3 (H) 2023     Check PTH, phosphorus and repeat  Last vitamin D level 50 1 in 2022  On high-dose vitamin D every other week  Orders:  -     Comprehensive metabolic panel; Future  -     PTH, intact; Future  -     Phosphorus; Future    4  Stage 2 chronic kidney disease  Assessment & Plan:  Lab Results   Component Value Date    EGFR 68 2023    EGFR 57 2022    EGFR 69 2022    CREATININE 0 84 2023    CREATININE 0 97 2022    CREATININE 0 83 2022     Stable kidney function  Improved  Do not suspect stage 3 CKD, continue jardiance and enalapril same dose  Patient concerned due to late  with dialysis  Avoid NSAIDs  Consider nephro referral        5  Gastroesophageal reflux disease without esophagitis  Assessment & Plan:  Improved with Mylanta  Continue as needed  Orders:  -     aluminum-magnesium hydroxide-simethicone (MYLANTA) 5981-2868-726 mg/30 mL suspension;  Take 30 mL by mouth every 4 (four) hours as needed for indigestion or heartburn    6  Type 2 diabetes mellitus without complication, without long-term current use of insulin ([facility]  Assessment & Plan:  Uncontrolled  Metformin caused diarrhea  Stopped jardiance and glimepiride about 2 months, noncompliance and self-discontinued due to concern for kidney function  Continue Ozempic 2mg once weekly  Recommend restart Jardiance same dose  Glimepiride previously on higher dose  Highly encouraged compliance with medication  Has appointment with endo tomorrow, discuss with endo medication management  Continue enalapril and lovastatin  Lab Results   Component Value Date    HGBA1C 9 6 (H) 03/02/2023         7  Acquired hypothyroidism  Assessment & Plan:  Continue levothyroxine 50mcg weekdays and 75mcg weekends per endo  Clinically euthyroid besides weight gain  Lab Results   Component Value Date    [id number] 2 640 03/02/2023           8  Class 3 obesity with alveolar hypoventilation, serious comorbidity, and body mass index (BMI) of 40 0 to 44 9 in adult [facility])  Assessment & Plan:  Discussed weight loss with patient, she has elliptical machine at home that she has not been using  Primarily waist circumference and lower extremity weight  Consider referral to weight management  9  Morbid obesity with BMI of 40 0-44 9, adult ([facility] )         Subjective      Liam Snyder is a 78 y o  female with a h/o morbid obesity, diabetes, hypothyroidism with history of hysterectomy oophorectomy due to history of endometrial cancer who presents with burning with urination x1 week  She has been taking leftover amoxcillin twice daily with minimal improvement this past week  She has had UTI in the past but has been several years  She stopped taking her glimepiride and Jardiance about 2 months ago due to concern with a lower GFR and history of her  dying from dialysis and diabetes    She was worried about her kidney function and did not talk to her doctor before discontinuing  She has appointment with endocrinology tomorrow  She has been compliant with other medication  She has been eating very poorly and not exercising  No fevers or chills  No back pain or gross blood in urine  Review of Systems   Constitutional: Negative for fever and unexpected weight change  Respiratory: Negative for shortness of breath  Cardiovascular: Negative for chest pain  Gastrointestinal: Negative for diarrhea, nausea and vomiting  Genitourinary: Positive for difficulty urinating, dysuria, frequency and urgency  Negative for decreased urine volume, flank pain, vaginal bleeding, vaginal discharge and vaginal pain  Current Outpatient Medications on File Prior to Visit   Medication Sig   • calcium carbonate-vitamin D (OSCAL-D) 500 mg-200 units per tablet Take 1 tablet by mouth 2 (two) times a day with meals   • Diclofenac Sodium (VOLTAREN) 1 % Apply 2 g topically 4 (four) times a day   • enalapril (VASOTEC) 2 5 mg tablet Take 2 5 mg by mouth every evening    • ergocalciferol (ERGOCALCIFEROL) 1 25 MG (48696 UT) capsule Take 1 cap every OTHER week with dinner  • glimepiride (AMARYL) 1 mg tablet TAKE ONE TABLET (1 MG TOTAL) BY MOUTH EVERY MORNING BEFORE BREAKFAST  • levothyroxine 50 mcg tablet Take 50 mcg by mouth daily    • levothyroxine 75 mcg tablet Take 75 mcg by mouth daily   • lovastatin (MEVACOR) 40 MG tablet Take 40 mg by mouth daily at bedtime   • Ozempic, 2 MG/DOSE, 8 MG/3ML SOPN INJECT 2 MG UNDER THE SKIN ONCE A WEEK  • VITAMIN D PO Take 50,000 Units by mouth every 14 (fourteen) days       Objective     /62 (BP Location: Left arm, Patient Position: Sitting, Cuff Size: Large)   Pulse 78   Temp 97 7 °F (36 5 °C) (Temporal)   Resp 20   Ht 4' 10" (1 473 m)   Wt 93 1 kg (205 lb 3 2 oz)   SpO2 97%   BMI 42 89 kg/m²     Physical Exam  Vitals and nursing note reviewed  Constitutional:       Appearance: Normal appearance  She is obese        Comments: Ambulates with cane   HENT:      Head: Normocephalic and atraumatic  Cardiovascular:      Rate and Rhythm: Normal rate and regular rhythm  Pulses: Normal pulses  Heart sounds: Normal heart sounds  Pulmonary:      Effort: Pulmonary effort is normal       Breath sounds: Normal breath sounds  Abdominal:      Tenderness: There is no right CVA tenderness or left CVA tenderness  Neurological:      Mental Status: She is alert and oriented to person, place, and time  Mental status is at baseline         Caryl Kramer PA-C

## 2023-03-06 NOTE — ASSESSMENT & PLAN NOTE
Lab Results   Component Value Date    WBC 8 63 08/16/2022    HGB 14 2 08/16/2022    HCT 46 0 08/16/2022    MCV 89 08/16/2022     08/16/2022

## 2023-03-09 PROBLEM — E66.01 MORBID OBESITY WITH BMI OF 40.0-44.9, ADULT (HCC): Status: ACTIVE | Noted: 2023-03-09

## 2023-03-09 PROBLEM — N18.2 STAGE 2 CHRONIC KIDNEY DISEASE: Status: ACTIVE | Noted: 2023-03-06

## 2023-03-09 PROBLEM — Z85.42 HISTORY OF ENDOMETRIAL CANCER: Status: ACTIVE | Noted: 2023-03-09

## 2023-03-09 RX ORDER — FLUCONAZOLE 200 MG/1
200 TABLET ORAL DAILY
Qty: 14 TABLET | Refills: 0 | Status: SHIPPED | OUTPATIENT
Start: 2023-03-09 | End: 2023-03-23

## 2023-03-09 NOTE — ASSESSMENT & PLAN NOTE
Lab Results   Component Value Date    EGFR 68 03/02/2023    EGFR 57 08/16/2022    EGFR 69 04/12/2022    CREATININE 0 84 03/02/2023    CREATININE 0 97 08/16/2022    CREATININE 0 83 04/12/2022     Stable kidney function  Improved  Do not suspect stage 3 CKD, continue jardiance and enalapril same dose  Patient concerned due to late  with dialysis  Avoid NSAIDs   Consider nephro referral

## 2023-03-09 NOTE — ASSESSMENT & PLAN NOTE
Lab Results   Component Value Date    CALCIUM 10 3 (H) 03/02/2023     Check PTH, phosphorus and repeat  Last vitamin D level 50 1 in 8/2022  On high-dose vitamin D every other week

## 2023-03-09 NOTE — ASSESSMENT & PLAN NOTE
Discussed weight loss with patient, she has elliptical machine at home that she has not been using  Primarily waist circumference and lower extremity weight  Consider referral to weight management

## 2023-03-09 NOTE — ASSESSMENT & PLAN NOTE
Continue levothyroxine 50mcg weekdays and 75mcg weekends per endo  Clinically euthyroid besides weight gain       Lab Results   Component Value Date    [id number] 2 640 03/02/2023

## 2023-03-10 ENCOUNTER — TELEPHONE (OUTPATIENT)
Dept: FAMILY MEDICINE CLINIC | Facility: CLINIC | Age: 78
End: 2023-03-10

## 2023-03-11 LAB — BACTERIA UR CULT: ABNORMAL

## 2023-03-20 RX ORDER — GLIMEPIRIDE 2 MG/1
1 TABLET ORAL DAILY
COMMUNITY
Start: 2023-03-07 | End: 2023-06-15 | Stop reason: ALTCHOICE

## 2023-03-27 ENCOUNTER — APPOINTMENT (OUTPATIENT)
Dept: LAB | Facility: IMAGING CENTER | Age: 78
End: 2023-03-27

## 2023-03-27 DIAGNOSIS — E83.52 HYPERCALCEMIA: ICD-10-CM

## 2023-03-27 DIAGNOSIS — B37.49 YEAST UTI: ICD-10-CM

## 2023-03-27 LAB
ALBUMIN SERPL BCP-MCNC: 3.7 G/DL (ref 3.5–5)
ALP SERPL-CCNC: 105 U/L (ref 46–116)
ALT SERPL W P-5'-P-CCNC: 21 U/L (ref 12–78)
ANION GAP SERPL CALCULATED.3IONS-SCNC: 3 MMOL/L (ref 4–13)
AST SERPL W P-5'-P-CCNC: 17 U/L (ref 5–45)
BACTERIA UR QL AUTO: ABNORMAL /HPF
BILIRUB SERPL-MCNC: 0.76 MG/DL (ref 0.2–1)
BILIRUB UR QL STRIP: NEGATIVE
BUDDING YEAST: PRESENT
BUN SERPL-MCNC: 16 MG/DL (ref 5–25)
CALCIUM SERPL-MCNC: 10.4 MG/DL (ref 8.3–10.1)
CHLORIDE SERPL-SCNC: 104 MMOL/L (ref 96–108)
CLARITY UR: ABNORMAL
CO2 SERPL-SCNC: 28 MMOL/L (ref 21–32)
COLOR UR: YELLOW
CREAT SERPL-MCNC: 0.92 MG/DL (ref 0.6–1.3)
GFR SERPL CREATININE-BSD FRML MDRD: 60 ML/MIN/1.73SQ M
GLUCOSE P FAST SERPL-MCNC: 155 MG/DL (ref 65–99)
GLUCOSE UR STRIP-MCNC: ABNORMAL MG/DL
HGB UR QL STRIP.AUTO: ABNORMAL
KETONES UR STRIP-MCNC: NEGATIVE MG/DL
LEUKOCYTE ESTERASE UR QL STRIP: ABNORMAL
NITRITE UR QL STRIP: NEGATIVE
NON-SQ EPI CELLS URNS QL MICRO: ABNORMAL /HPF
PH UR STRIP.AUTO: 6 [PH]
PHOSPHATE SERPL-MCNC: 2.7 MG/DL (ref 2.3–4.1)
POTASSIUM SERPL-SCNC: 3.7 MMOL/L (ref 3.5–5.3)
PROT SERPL-MCNC: 7.1 G/DL (ref 6.4–8.4)
PROT UR STRIP-MCNC: ABNORMAL MG/DL
PTH-INTACT SERPL-MCNC: 119 PG/ML (ref 18.4–80.1)
RBC #/AREA URNS AUTO: ABNORMAL /HPF
SODIUM SERPL-SCNC: 135 MMOL/L (ref 135–147)
SP GR UR STRIP.AUTO: 1.03 (ref 1–1.03)
UROBILINOGEN UR STRIP-ACNC: <2 MG/DL
WBC #/AREA URNS AUTO: ABNORMAL /HPF
WBC CLUMPS # UR AUTO: PRESENT /UL

## 2023-03-28 LAB — BACTERIA UR CULT: NORMAL

## 2023-04-03 DIAGNOSIS — E83.52 HYPERCALCEMIA: ICD-10-CM

## 2023-04-03 DIAGNOSIS — E21.3 HYPERPARATHYROIDISM (HCC): Primary | ICD-10-CM

## 2023-04-03 DIAGNOSIS — B37.49 YEAST UTI: ICD-10-CM

## 2023-04-03 RX ORDER — FINERENONE 20 MG/1
20 TABLET, FILM COATED ORAL DAILY
COMMUNITY
Start: 2023-03-07

## 2023-06-01 ENCOUNTER — RA CDI HCC (OUTPATIENT)
Dept: OTHER | Facility: HOSPITAL | Age: 78
End: 2023-06-01

## 2023-06-01 NOTE — PROGRESS NOTES
E11 22, e11 65  [facility]  coding opportunities          Chart Reviewed number of suggestions sent to Provider: 2     Patients Insurance     Medicare Insurance: [employer]

## 2023-06-14 ENCOUNTER — APPOINTMENT (OUTPATIENT)
Dept: LAB | Facility: IMAGING CENTER | Age: 78
End: 2023-06-14
Payer: COMMERCIAL

## 2023-06-14 DIAGNOSIS — B37.49 YEAST UTI: ICD-10-CM

## 2023-06-14 DIAGNOSIS — I10 ESSENTIAL HYPERTENSION, BENIGN: ICD-10-CM

## 2023-06-14 DIAGNOSIS — E11.69 TYPE 2 DIABETES MELLITUS WITH OTHER SPECIFIED COMPLICATION, WITHOUT LONG-TERM CURRENT USE OF INSULIN (HCC): ICD-10-CM

## 2023-06-14 DIAGNOSIS — N18.2 CHRONIC KIDNEY DISEASE, STAGE II (MILD): ICD-10-CM

## 2023-06-14 DIAGNOSIS — E83.52 HYPERCALCEMIA: ICD-10-CM

## 2023-06-14 DIAGNOSIS — E55.9 VITAMIN D DEFICIENCY: ICD-10-CM

## 2023-06-14 DIAGNOSIS — E21.3 HYPERPARATHYROIDISM (HCC): ICD-10-CM

## 2023-06-14 LAB
25(OH)D3 SERPL-MCNC: 34.7 NG/ML (ref 30–100)
ALBUMIN SERPL BCP-MCNC: 3.6 G/DL (ref 3.5–5)
ALP SERPL-CCNC: 112 U/L (ref 46–116)
ALT SERPL W P-5'-P-CCNC: 23 U/L (ref 12–78)
ANION GAP SERPL CALCULATED.3IONS-SCNC: 2 MMOL/L (ref 4–13)
AST SERPL W P-5'-P-CCNC: 14 U/L (ref 5–45)
BACTERIA UR QL AUTO: ABNORMAL /HPF
BASOPHILS # BLD AUTO: 0.06 THOUSANDS/ÂΜL (ref 0–0.1)
BASOPHILS NFR BLD AUTO: 1 % (ref 0–1)
BILIRUB SERPL-MCNC: 0.77 MG/DL (ref 0.2–1)
BILIRUB UR QL STRIP: NEGATIVE
BUDDING YEAST: PRESENT
BUN SERPL-MCNC: 18 MG/DL (ref 5–25)
CALCIUM SERPL-MCNC: 10.2 MG/DL (ref 8.3–10.1)
CAOX CRY URNS QL MICRO: ABNORMAL /HPF
CHLORIDE SERPL-SCNC: 109 MMOL/L (ref 96–108)
CLARITY UR: CLEAR
CO2 SERPL-SCNC: 27 MMOL/L (ref 21–32)
COLOR UR: ABNORMAL
CREAT SERPL-MCNC: 0.83 MG/DL (ref 0.6–1.3)
EOSINOPHIL # BLD AUTO: 0.15 THOUSAND/ÂΜL (ref 0–0.61)
EOSINOPHIL NFR BLD AUTO: 2 % (ref 0–6)
ERYTHROCYTE [DISTWIDTH] IN BLOOD BY AUTOMATED COUNT: 14.6 % (ref 11.6–15.1)
EST. AVERAGE GLUCOSE BLD GHB EST-MCNC: 163 MG/DL
GFR SERPL CREATININE-BSD FRML MDRD: 68 ML/MIN/1.73SQ M
GLUCOSE P FAST SERPL-MCNC: 131 MG/DL (ref 65–99)
GLUCOSE UR STRIP-MCNC: ABNORMAL MG/DL
HBA1C MFR BLD: 7.3 %
HCT VFR BLD AUTO: 45.3 % (ref 34.8–46.1)
HGB BLD-MCNC: 14 G/DL (ref 11.5–15.4)
HGB UR QL STRIP.AUTO: NEGATIVE
HYALINE CASTS #/AREA URNS LPF: ABNORMAL /LPF
IMM GRANULOCYTES # BLD AUTO: 0.03 THOUSAND/UL (ref 0–0.2)
IMM GRANULOCYTES NFR BLD AUTO: 0 % (ref 0–2)
KETONES UR STRIP-MCNC: NEGATIVE MG/DL
LEUKOCYTE ESTERASE UR QL STRIP: ABNORMAL
LYMPHOCYTES # BLD AUTO: 2.32 THOUSANDS/ÂΜL (ref 0.6–4.47)
LYMPHOCYTES NFR BLD AUTO: 33 % (ref 14–44)
MCH RBC QN AUTO: 27.3 PG (ref 26.8–34.3)
MCHC RBC AUTO-ENTMCNC: 30.9 G/DL (ref 31.4–37.4)
MCV RBC AUTO: 88 FL (ref 82–98)
MONOCYTES # BLD AUTO: 0.69 THOUSAND/ÂΜL (ref 0.17–1.22)
MONOCYTES NFR BLD AUTO: 10 % (ref 4–12)
NEUTROPHILS # BLD AUTO: 3.74 THOUSANDS/ÂΜL (ref 1.85–7.62)
NEUTS SEG NFR BLD AUTO: 54 % (ref 43–75)
NITRITE UR QL STRIP: NEGATIVE
NON-SQ EPI CELLS URNS QL MICRO: ABNORMAL /HPF
NRBC BLD AUTO-RTO: 0 /100 WBCS
PH UR STRIP.AUTO: 5.5 [PH]
PLATELET # BLD AUTO: 297 THOUSANDS/UL (ref 149–390)
PMV BLD AUTO: 10.9 FL (ref 8.9–12.7)
POTASSIUM SERPL-SCNC: 4.2 MMOL/L (ref 3.5–5.3)
PROT SERPL-MCNC: 7.1 G/DL (ref 6.4–8.4)
PROT UR STRIP-MCNC: NEGATIVE MG/DL
PTH-INTACT SERPL-MCNC: 129.7 PG/ML (ref 12–88)
RBC # BLD AUTO: 5.13 MILLION/UL (ref 3.81–5.12)
RBC #/AREA URNS AUTO: ABNORMAL /HPF
SODIUM SERPL-SCNC: 138 MMOL/L (ref 135–147)
SP GR UR STRIP.AUTO: 1.03 (ref 1–1.03)
UROBILINOGEN UR STRIP-ACNC: <2 MG/DL
WBC # BLD AUTO: 6.99 THOUSAND/UL (ref 4.31–10.16)
WBC #/AREA URNS AUTO: ABNORMAL /HPF

## 2023-06-14 PROCEDURE — 80053 COMPREHEN METABOLIC PANEL: CPT

## 2023-06-14 PROCEDURE — 83970 ASSAY OF PARATHORMONE: CPT

## 2023-06-14 PROCEDURE — 82306 VITAMIN D 25 HYDROXY: CPT

## 2023-06-14 PROCEDURE — 36415 COLL VENOUS BLD VENIPUNCTURE: CPT

## 2023-06-14 PROCEDURE — 85025 COMPLETE CBC W/AUTO DIFF WBC: CPT

## 2023-06-14 PROCEDURE — 81001 URINALYSIS AUTO W/SCOPE: CPT

## 2023-06-14 PROCEDURE — 83036 HEMOGLOBIN GLYCOSYLATED A1C: CPT

## 2023-06-15 ENCOUNTER — OFFICE VISIT (OUTPATIENT)
Dept: FAMILY MEDICINE CLINIC | Facility: CLINIC | Age: 78
End: 2023-06-15
Payer: COMMERCIAL

## 2023-06-15 VITALS
HEART RATE: 76 BPM | WEIGHT: 195 LBS | OXYGEN SATURATION: 98 % | HEIGHT: 58 IN | RESPIRATION RATE: 16 BRPM | DIASTOLIC BLOOD PRESSURE: 70 MMHG | BODY MASS INDEX: 40.93 KG/M2 | TEMPERATURE: 97.9 F | SYSTOLIC BLOOD PRESSURE: 120 MMHG

## 2023-06-15 DIAGNOSIS — E66.01 MORBID OBESITY WITH BMI OF 40.0-44.9, ADULT (HCC): ICD-10-CM

## 2023-06-15 DIAGNOSIS — Z12.31 BREAST CANCER SCREENING BY MAMMOGRAM: Primary | ICD-10-CM

## 2023-06-15 DIAGNOSIS — E11.9 TYPE 2 DIABETES MELLITUS WITHOUT COMPLICATION, WITHOUT LONG-TERM CURRENT USE OF INSULIN (HCC): ICD-10-CM

## 2023-06-15 DIAGNOSIS — K86.89 PANCREATIC MASS: ICD-10-CM

## 2023-06-15 PROCEDURE — 99214 OFFICE O/P EST MOD 30 MIN: CPT | Performed by: FAMILY MEDICINE

## 2023-06-15 NOTE — PROGRESS NOTES
Name: Sarah Serrato      : 1945      MRN: [id number]  Encounter Provider: Ariana Brown MD  Encounter Date: 6/15/2023   Encounter department: [facility]   Patient with a history of a mass in the pancreatic gland  Requesting repeat MRI of the abdomen to follow-up  Again I encourage patient to continue breast cancer screening annually  BMI of 40 76: Requested to walk, decrease portion size  Diabetes is on target for her age a 65-year-old female with hemoglobin 7 3  He is susceptible we will continue same treatment  She also continues seeing endocrinologist    1  Breast cancer screening by mammogram  -     Mammo screening bilateral w 3d & cad; Future; Expected date: 2023    2  Morbid obesity with BMI of 40 0-44 9, adult ([facility] [address] )    3  Pancreatic mass  -     MRI abdomen w wo contrast; Future; Expected date: 06/15/2023    4  Type 2 diabetes mellitus without complication, without long-term current use of insulin ([facility])  Assessment & Plan:    Lab Results   Component Value Date    HGBA1C 7 3 (H) 2023       Orders:  -     metFORMIN (GLUCOPHAGE) 500 mg tablet; Take 1 tablet (500 mg total) by mouth 2 (two) times a day with meals   Subjective      HPI   Patient is here to follow Diabetes and HTN, patient states good compliance with treatment  Denies chest pain, shortness of breath, angina, urinary problems  No exercise but follows low salt and low carbohydrates diet        Empagliflozin Tabs  enalapril  levothyroxine  lovastatin  metFORMIN         Review of Systems    Current Outpatient Medications on File Prior to Visit   Medication Sig   • Empagliflozin (Jardiance) 25 MG TABS Take 1 tablet (25 mg total) by mouth every morning   • enalapril (VASOTEC) 2 5 mg tablet Take 2 5 mg by mouth every evening    • levothyroxine 50 mcg tablet Take 50 mcg by mouth daily    • levothyroxine 75 mcg tablet Take 75 mcg by mouth daily   • "lovastatin (MEVACOR) 40 MG tablet Take 40 mg by mouth daily at bedtime       Objective     /70 (BP Location: Left arm, Patient Position: Sitting, Cuff Size: Standard)   Pulse 76   Temp 97 9 °F (36 6 °C) (Tympanic)   Resp 16   Ht 4' 10\" (1 473 m)   Wt 88 5 kg (195 lb)   SpO2 98%   BMI 40 76 kg/m²     Physical Exam  Elaine Peña MD  "

## 2023-09-19 ENCOUNTER — HOSPITAL ENCOUNTER (OUTPATIENT)
Dept: RADIOLOGY | Facility: IMAGING CENTER | Age: 78
Discharge: HOME | End: 2023-09-19
Payer: COMMERCIAL

## 2023-09-19 VITALS — HEIGHT: 58 IN | BODY MASS INDEX: 39.66 KG/M2 | WEIGHT: 188.93 LBS

## 2023-09-19 DIAGNOSIS — Z12.31 BREAST CANCER SCREENING BY MAMMOGRAM: ICD-10-CM

## 2023-09-19 PROCEDURE — 77063 BREAST TOMOSYNTHESIS BI: CPT

## 2023-09-19 PROCEDURE — 77067 SCR MAMMO BI INCL CAD: CPT

## 2023-10-05 ENCOUNTER — HOSPITAL ENCOUNTER (OUTPATIENT)
Dept: RADIOLOGY | Facility: HOSPITAL | Age: 78
End: 2023-10-05
Payer: COMMERCIAL

## 2023-10-05 ENCOUNTER — OFFICE VISIT (OUTPATIENT)
Dept: FAMILY MEDICINE CLINIC | Facility: CLINIC | Age: 78
End: 2023-10-05
Payer: COMMERCIAL

## 2023-10-05 ENCOUNTER — LAB (OUTPATIENT)
Dept: LAB | Facility: HOSPITAL | Age: 78
End: 2023-10-05
Payer: COMMERCIAL

## 2023-10-05 VITALS
HEART RATE: 76 BPM | DIASTOLIC BLOOD PRESSURE: 70 MMHG | WEIGHT: 189 LBS | BODY MASS INDEX: 40.78 KG/M2 | SYSTOLIC BLOOD PRESSURE: 110 MMHG | RESPIRATION RATE: 16 BRPM | OXYGEN SATURATION: 98 % | TEMPERATURE: 97.9 F | HEIGHT: 57 IN

## 2023-10-05 DIAGNOSIS — N25.81 SECONDARY HYPERPARATHYROIDISM OF RENAL ORIGIN (HCC): ICD-10-CM

## 2023-10-05 DIAGNOSIS — M54.50 ACUTE MIDLINE LOW BACK PAIN WITHOUT SCIATICA: ICD-10-CM

## 2023-10-05 DIAGNOSIS — E03.9 ACQUIRED HYPOTHYROIDISM: ICD-10-CM

## 2023-10-05 DIAGNOSIS — Z23 NEEDS FLU SHOT: ICD-10-CM

## 2023-10-05 DIAGNOSIS — Z00.00 MEDICARE ANNUAL WELLNESS VISIT, SUBSEQUENT: Primary | ICD-10-CM

## 2023-10-05 DIAGNOSIS — E11.9 TYPE 2 DIABETES MELLITUS WITHOUT COMPLICATION, WITHOUT LONG-TERM CURRENT USE OF INSULIN (HCC): ICD-10-CM

## 2023-10-05 DIAGNOSIS — E66.01 MORBID OBESITY WITH BMI OF 40.0-44.9, ADULT (HCC): ICD-10-CM

## 2023-10-05 LAB
LEFT EYE DIABETIC RETINOPATHY: ABNORMAL
LEFT EYE MACULAR EDEMA: ABNORMAL
LEFT EYE OTHER RETINOPATHY: ABNORMAL
RIGHT EYE DIABETIC RETINOPATHY: ABNORMAL
RIGHT EYE IMAGE QUALITY: ABNORMAL
RIGHT EYE MACULAR EDEMA: ABNORMAL
RIGHT EYE OTHER RETINOPATHY: ABNORMAL
SEVERITY (EYE EXAM): ABNORMAL
TSH SERPL DL<=0.05 MIU/L-ACNC: 1.71 UIU/ML (ref 0.45–4.5)

## 2023-10-05 PROCEDURE — 84443 ASSAY THYROID STIM HORMONE: CPT

## 2023-10-05 PROCEDURE — 72110 X-RAY EXAM L-2 SPINE 4/>VWS: CPT

## 2023-10-05 PROCEDURE — 99214 OFFICE O/P EST MOD 30 MIN: CPT | Performed by: FAMILY MEDICINE

## 2023-10-05 PROCEDURE — 36415 COLL VENOUS BLD VENIPUNCTURE: CPT

## 2023-10-05 PROCEDURE — G0439 PPPS, SUBSEQ VISIT: HCPCS | Performed by: FAMILY MEDICINE

## 2023-10-05 PROCEDURE — 90662 IIV NO PRSV INCREASED AG IM: CPT | Performed by: FAMILY MEDICINE

## 2023-10-05 PROCEDURE — G0008 ADMIN INFLUENZA VIRUS VAC: HCPCS | Performed by: FAMILY MEDICINE

## 2023-10-05 RX ORDER — DIAZEPAM 2 MG/1
TABLET ORAL
COMMUNITY
Start: 2023-09-26 | End: 2023-10-05 | Stop reason: ALTCHOICE

## 2023-10-05 RX ORDER — FINERENONE 20 MG/1
TABLET, FILM COATED ORAL
COMMUNITY
Start: 2023-06-28

## 2023-10-05 RX ORDER — GLIMEPIRIDE 2 MG/1
2 TABLET ORAL DAILY
COMMUNITY
Start: 2023-08-06

## 2023-10-05 RX ORDER — SEMAGLUTIDE 2.68 MG/ML
INJECTION, SOLUTION SUBCUTANEOUS
COMMUNITY
Start: 2023-08-03

## 2023-10-05 NOTE — PROGRESS NOTES
Assessment and Plan:     A: The patient's chronic conditions appear to be well-managed. The new onset of lower back and abdominal pain is concerning. The differential diagnosis includes vertebral ifracture, kidney stones, less likely due to abscence of systemic sxs., or gastrointestinal issues. However, the absence of nausea suggests the pain may not be due to an internal organ involvement. P: I will order a lumbar spine X-ray to rule out any vertebral fractures or other abnormalities. If the X-ray does not show any issues, the patient will be advised to manage the pain with Tylenol. The patient will continue her current medication regimen. She will also follow up with her hearing specialist for her left ear issue. Problem List Items Addressed This Visit     Acute midline low back pain without sciatica    Relevant Orders    XR spine lumbar 2 or 3 views injury    Hypothyroidism    Relevant Orders    TSH, 3rd generation    Medicare annual wellness visit, subsequent - Primary    Morbid obesity with BMI of 40.0-44.9, adult ([address])    Secondary hyperparathyroidism of renal origin ([address])    Type 2 diabetes mellitus without complication, without long-term current use of insulin ([facility])       Lab Results   Component Value Date    HGBA1C 7.3 (H) 06/14/2023            Relevant Medications    Ozempic, 2 MG/DOSE, 8 MG/3ML injection pen    glimepiride (AMARYL) 2 mg tablet    Other Relevant Orders    IRIS Diabetic eye exam   Other Visit Diagnoses     Needs flu shot        Relevant Orders    influenza vaccine, high-dose, PF 0.7 mL (FLUZONE HIGH-DOSE) (Completed)           Preventive health issues were discussed with patient, and age appropriate screening tests were ordered as noted in patient's After Visit Summary. Personalized health advice and appropriate referrals for health education or preventive services given if needed, as noted in patient's After Visit Summary.      History of Present Illness:     Patient presents for a Medicare Wellness Visit    SOAP Note:    S: 61-year-old female patient presents for Medicare annual wellness visit. She has a history of GERD, hypothyroidism, diabetes, obesity (BMI 40.9), and hypertension. Patient reports new onset of pain in the lower back and abdomen area. She describes the pain as fluctuating and more severe in the morning. She denies any nausea, fever, or urinary burning. She reports occasional loose stools but no consistent pattern. She denies any leg or chest pain. She reports feeling tired when walking with a cane. She denies any shortness of breath unless exerting herself beyond her usual activity. She reports her blood sugar was 124 two hours after breakfast. She denies any changes in her medication regimen. She reports she is due for a hearing test due to issues with her left ear. Note: The patient is active and lives with her daughter. She reports she has been experiencing the pain for about two to three weeks. She has been painting and doing other physical activities around her house. She reports her daughter is often busy with work and they do not have much time to talk even though they live in the same house. O: Patient's blood pressure is 110/70. She is currently taking medications for her chronic conditions including diabetes, hypertension, and hypothyroidism. Her hemoglobin A1C was 7.3% in July.             Patient Care Team:  Kira Alvarez MD as PCP - General (Family Medicine)     Review of Systems:     Review of Systems     Problem List:     Patient Active Problem List   Diagnosis   • Type 2 diabetes mellitus without complication, without long-term current use of insulin ([address])   • Hypothyroidism   • Hypercalcemia   • Primary osteoarthritis of right knee   • Esophageal stricture   • Venous (peripheral) insufficiency   • Benign essential HTN   • Hyperlipidemia   • Class 3 obesity with alveolar hypoventilation, serious comorbidity, and body mass index (BMI) of 40.0 to 44.9 in adult [facility])   • Gastroesophageal reflux disease without esophagitis   • History of colon polyps   • Medicare annual wellness visit, subsequent   • Achalasia   • Osteopenia of multiple sites   • Alkaline phosphatase elevation   • Intraductal papillary mucinous adenoma of pancreas   • Stage 2 chronic kidney disease   • Morbid obesity with BMI of 40.0-44.9, adult ([facility])   • History of endometrial cancer   • Secondary hyperparathyroidism of renal origin ([address])   • Acute midline low back pain without sciatica      Past Medical and Surgical History:     Past Medical History:   Diagnosis Date   • Arthritis    • Colon polyp    • Diabetes ([address])    • Diabetes mellitus ([address])    • Endometrial ca ([address])    • GERD (gastroesophageal reflux disease)    • Hard of hearing     has hearing aids but doesnt wear them   • Hiatal hernia    • History of total right knee replacement    • Hyperlipidemia    • Hypertension    • Muscle weakness    • Obesity    • Refusal of blood product    • Risk for falls    • Stress incontinence    • Swelling of both lower extremities    • Thyroid disease     hypo   • Tinnitus    • Uncontrolled type 2 diabetes mellitus with hyperglycemia ([address]) 2020   • Use of cane as ambulatory aid    • Wears glasses      Past Surgical History:   Procedure Laterality Date   • CATARACT EXTRACTION Bilateral    •  SECTION      x2   • COLONOSCOPY  02/15/2016    small polyp, repeat 5yrs   • EGD     • EYE SURGERY     • HYSTERECTOMY      age 48   • JOINT REPLACEMENT Right     knee   • OOPHORECTOMY      both removed age 48   • [place] ARTHRP KNE CONDYLE&PLATU MEDIAL&LAT COMPARTMENTS Right 2019    Procedure: ARTHROPLASTY KNEE TOTAL;  Surgeon: Hailey Bennett DO;  Location: [facility];  Service: Orthopedics   • NY LAPS ESOPHAGOMYOTOMY W/FUNDOPLASTY IF PERFORMED N/A 2020    Procedure: LAPAROSCOPIC HELLER MYOTOMY WITH TOUPET FUNDOPLICATION  W ROBOTICS;  Surgeon: Mildred Parson MD;  Location: [facility];  Service: General   • UPPER GASTROINTESTINAL ENDOSCOPY     • WISDOM TOOTH EXTRACTION        Family History:     Family History   Problem Relation Age of Onset   • Breast cancer Mother 80   • Diabetes Father    • Hypertension Father    • Breast cancer Sister 46   • No Known Problems Sister    • Endometrial cancer Daughter 52   • No Known Problems Maternal Grandmother    • Cancer Maternal Grandfather         melanoma   • No Known Problems Paternal Grandmother    • No Known Problems Paternal Grandfather    • No Known Problems Maternal Aunt    • No Known Problems Maternal Aunt    • No Known Problems Maternal Aunt    • No Known Problems Paternal Aunt    • No Known Problems Paternal Aunt    • No Known Problems Paternal Aunt    • No Known Problems Paternal Aunt       Social History:     Social History     Socioeconomic History   • Marital status:      Spouse name: None   • Number of children: None   • Years of education: None   • Highest education level: None   Occupational History   • None   Tobacco Use   • Smoking status: Former     Types: Cigarettes     Quit date: 10/28/1971     Years since quittin.9   • Smokeless tobacco: Never   Vaping Use   • Vaping Use: Never used   Substance and Sexual Activity   • Alcohol use: Never   • Drug use: No   • Sexual activity: None   Other Topics Concern   • None   Social History Narrative   • None     Social Determinants of Health     Financial Resource Strain: Low Risk  (10/5/2023)    Overall Financial Resource Strain (CARDIA)    • Difficulty of Paying Living Expenses: Not hard at all   Food Insecurity: Not on file   Transportation Needs: No Transportation Needs (10/5/2023)    PRAPARE - Transportation    • Lack of Transportation (Medical): No    • Lack of Transportation (Non-Medical):  No   Physical Activity: Not on file   Stress: Not on file   Social Connections: Not on file   Intimate Partner Violence: Not on file   Housing Stability: Not on file      Medications and Allergies: Current Outpatient Medications   Medication Sig Dispense Refill   • Empagliflozin (Jardiance) 25 MG TABS Take 1 tablet (25 mg total) by mouth every morning 90 tablet 3   • enalapril (VASOTEC) 2.5 mg tablet Take 2.5 mg by mouth every evening   1   • glimepiride (AMARYL) 2 mg tablet Take 2 mg by mouth daily     • Kerendia 20 MG TABS TAKE ONE TABLET (20 MG) BY MOUTH EVERY MORNING. • levothyroxine 50 mcg tablet Take 50 mcg by mouth daily   1   • levothyroxine 75 mcg tablet Take 75 mcg by mouth daily     • lovastatin (MEVACOR) 40 MG tablet Take 40 mg by mouth daily at bedtime  3   • metFORMIN (GLUCOPHAGE) 500 mg tablet Take 1 tablet (500 mg total) by mouth 2 (two) times a day with meals . 60 tablet 5   • Ozempic, 2 MG/DOSE, 8 MG/3ML injection pen INJECT 2 MG UNDER THE SKIN ONCE A WEEK. No current facility-administered medications for this visit. No Known Allergies   Immunizations:     Immunization History   Administered Date(s) Administered   • COVID-19 MODERNA VACC 0.5 ML IM 03/26/2021, 04/28/2021   • INFLUENZA 01/10/2013, 10/30/2015, 11/01/2016, 10/02/2017, 11/26/2018   • Influenza Split 11/19/2013, 12/18/2014   • Influenza Split High Dose Preservative Free IM 10/02/2017   • Influenza, high dose seasonal 0.7 mL 11/26/2018, 11/11/2019, 12/09/2021, 10/05/2023   • Pneumococcal Conjugate 13-Valent 01/22/2020   • Pneumococcal Polysaccharide PPV23 12/01/2014   • Zoster Vaccine Recombinant 09/10/2021, 04/14/2022      Health Maintenance:         Topic Date Due   • DXA SCAN  01/24/2024   • Breast Cancer Screening: Mammogram  09/19/2024   • Colorectal Cancer Screening  01/28/2025   • Hepatitis C Screening  Completed         Topic Date Due   • COVID-19 Vaccine (3 - Francesca Level series) 06/23/2021      Medicare Screening Tests and Risk Assessments:     Benedict Crigler is here for her Subsequent Wellness visit. Last Medicare Wellness visit information reviewed, patient interviewed and updates made to the record today. Health Risk Assessment:   Patient rates overall health as fair. Patient feels that their physical health rating is slightly worse. Patient is satisfied with their life. Eyesight was rated as same. Hearing was rated as slightly worse. Patient feels that their emotional and mental health rating is same. Patients states they are never, rarely angry. Patient states they are sometimes unusually tired/fatigued. Pain experienced in the last 7 days has been some. Patient's pain rating has been 3/10. Patient states that she has experienced no weight loss or gain in last 6 months. Fall Risk Screening: In the past year, patient has experienced: no history of falling in past year      Urinary Incontinence Screening:   Patient has not leaked urine accidently in the last six months. Home Safety:  Patient has trouble with stairs inside or outside of their home. Patient has working smoke alarms and has working carbon monoxide detector. Home safety hazards include: none. Nutrition:   Current diet is Diabetic. Medications:   Patient is currently taking over-the-counter supplements. OTC medications include: see medication list. Patient is able to manage medications. Activities of Daily Living (ADLs)/Instrumental Activities of Daily Living (IADLs):   Walk and transfer into and out of bed and chair?: Yes  Dress and groom yourself?: Yes    Bathe or shower yourself?: Yes    Feed yourself? Yes  Do your laundry/housekeeping?: Yes  Manage your money, pay your bills and track your expenses?: Yes  Make your own meals?: Yes    Do your own shopping?: Yes    Previous Hospitalizations:   Any hospitalizations or ED visits within the last 12 months?: Yes    How many hospitalizations have you had in the last year?: 1-2    Advance Care Planning:   Living will: Yes    Durable POA for healthcare:  Yes    Advanced directive: Yes    Advanced directive counseling given: Yes    Five wishes given: Yes    Patient declined ACP directive: No    End of Life Decisions reviewed with patient: Yes    Provider agrees with end of life decisions: Yes      Cognitive Screening:   Provider or family/friend/caregiver concerned regarding cognition?: No    PREVENTIVE SCREENINGS      Cardiovascular Screening:    General: Screening Not Indicated and History Lipid Disorder    Due for: Lipid Panel      Diabetes Screening:     General: Screening Not Indicated and History Diabetes    Due for: Blood Glucose      Colorectal Cancer Screening:     General: Screening Current    Due for: FOBT/FIT      Breast Cancer Screening:     General: Screening Current      Cervical Cancer Screening:    General: Screening Not Indicated      Osteoporosis Screening:    General: Screening Current    Due for: DXA Axial      Abdominal Aortic Aneurysm (AAA) Screening:        General: Screening Not Indicated      Lung Cancer Screening:     General: Screening Not Indicated      Hepatitis C Screening:    General: Screening Current    Hep C Screening Accepted: Yes      Screening, Brief Intervention, and Referral to Treatment (SBIRT)    Screening  Typical number of drinks in a day: 0  Typical number of drinks in a week: 0  Interpretation: Low risk drinking behavior. Single Item Drug Screening:  How often have you used an illegal drug (including marijuana) or a prescription medication for non-medical reasons in the past year? never    Single Item Drug Screen Score: 0  Interpretation: Negative screen for possible drug use disorder    Other Counseling Topics:   Alcohol use counseling, car/seat belt/driving safety, skin self-exam, sunscreen and calcium and vitamin D intake and regular weightbearing exercise. No results found.      Physical Exam:     /70 (BP Location: Left arm, Patient Position: Sitting, Cuff Size: Standard)   Pulse 76   Temp 97.9 °F (36.6 °C) (Tympanic)   Resp 16   Ht 4' 9" (1.448 m)   Wt 85.7 kg (189 lb)   SpO2 98%   BMI 40.90 kg/m²     Physical Exam  Cardiovascular:      Pulses: no weak pulses          Dorsalis pedis pulses are 2+ on the right side and 2+ on the left side. Posterior tibial pulses are 2+ on the right side and 2+ on the left side. Musculoskeletal:        Feet:       Comments: Tender is more around L2 to L5 in middle area and right SI joint.-   Feet:      Right foot:      Skin integrity: Callus and dry skin present. No ulcer, skin breakdown, erythema or warmth. Left foot:      Skin integrity: Callus and dry skin present. No ulcer, skin breakdown, erythema or warmth. Patient's shoes and socks removed. Right Foot/Ankle   Right Foot Inspection  Skin Exam: skin normal, skin intact, dry skin, callus and callus. No warmth, no erythema, no maceration, no abnormal color, no pre-ulcer and no ulcer. Toe Exam: ROM and strength within normal limits. No swelling, no tenderness, erythema and  no right toe deformity    Sensory   Vibration: intact  Proprioception: intact  Monofilament testing: intact    Vascular  Capillary refills: < 3 seconds  The right DP pulse is 2+. The right PT pulse is 2+. Left Foot/Ankle  Left Foot Inspection  Skin Exam: skin normal, skin intact, dry skin and callus. No warmth, no erythema, no maceration, normal color, no pre-ulcer and no ulcer. Toe Exam: ROM and strength within normal limits. No swelling, no tenderness, no erythema and no left toe deformity. Sensory   Vibration: intact  Proprioception: intact  Monofilament testing: intact    Vascular  Capillary refills: < 3 seconds  The left DP pulse is 2+. The left PT pulse is 2+.      Assign Risk Category  No deformity present  No loss of protective sensation  No weak pulses  Risk: 2        Paolo Ellis MD

## 2023-10-05 NOTE — PATIENT INSTRUCTIONS
Medicare Preventive Visit Patient Instructions  Thank you for completing your Welcome to Medicare Visit or Medicare Annual Wellness Visit today. Your next wellness visit will be due in one year (10/5/2024). The screening/preventive services that you may require over the next 5-10 years are detailed below. Some tests may not apply to you based off risk factors and/or age. Screening tests ordered at today's visit but not completed yet may show as past due. Also, please note that scanned in results may not display below. Preventive Screenings:  Service Recommendations Previous Testing/Comments   Colorectal Cancer Screening  * Colonoscopy    * Fecal Occult Blood Test (FOBT)/Fecal Immunochemical Test (FIT)  * Fecal DNA/Cologuard Test  * Flexible Sigmoidoscopy Age: 43-73 years old   Colonoscopy: every 10 years (may be performed more frequently if at higher risk)  OR  FOBT/FIT: every 1 year  OR  Cologuard: every 3 years  OR  Sigmoidoscopy: every 5 years  Screening may be recommended earlier than age 39 if at higher risk for colorectal cancer. Also, an individualized decision between you and your healthcare provider will decide whether screening between the ages of 77-80 would be appropriate. Colonoscopy: 01/28/2020  FOBT/FIT: Not on file  Cologuard: Not on file  Sigmoidoscopy: Not on file    Screening Current     Breast Cancer Screening Age: 36 years old  Frequency: every 1-2 years  Not required if history of left and right mastectomy Mammogram: 09/19/2023    Screening Current   Cervical Cancer Screening Between the ages of 21-29, pap smear recommended once every 3 years. Between the ages of 32-69, can perform pap smear with HPV co-testing every 5 years.    Recommendations may differ for women with a history of total hysterectomy, cervical cancer, or abnormal pap smears in past. Pap Smear: Not on file    Screening Not Indicated   Hepatitis C Screening Once for adults born between 1945 and 1965  More frequently in patients at high risk for Hepatitis C Hep C Antibody: 06/03/2019    Screening Current   Diabetes Screening 1-2 times per year if you're at risk for diabetes or have pre-diabetes Fasting glucose: 131 mg/dL (6/14/2023)  A1C: 7.3 % (6/14/2023)  Screening Not Indicated  History Diabetes   Cholesterol Screening Once every 5 years if you don't have a lipid disorder. May order more often based on risk factors. Lipid panel: 03/02/2023    Screening Not Indicated  History Lipid Disorder     Other Preventive Screenings Covered by Medicare:  1. Abdominal Aortic Aneurysm (AAA) Screening: covered once if your at risk. You're considered to be at risk if you have a family history of AAA. 2. Lung Cancer Screening: covers low dose CT scan once per year if you meet all of the following conditions: (1) Age 48-67; (2) No signs or symptoms of lung cancer; (3) Current smoker or have quit smoking within the last 15 years; (4) You have a tobacco smoking history of at least 20 pack years (packs per day multiplied by number of years you smoked); (5) You get a written order from a healthcare provider. 3. Glaucoma Screening: covered annually if you're considered high risk: (1) You have diabetes OR (2) Family history of glaucoma OR (3)  aged 48 and older OR (3)  American aged 72 and older  3. Osteoporosis Screening: covered every 2 years if you meet one of the following conditions: (1) You're estrogen deficient and at risk for osteoporosis based off medical history and other findings; (2) Have a vertebral abnormality; (3) On glucocorticoid therapy for more than 3 months; (4) Have primary hyperparathyroidism; (5) On osteoporosis medications and need to assess response to drug therapy. · Last bone density test (DXA Scan): 01/24/2022.  5. HIV Screening: covered annually if you're between the age of 15-65. Also covered annually if you are younger than 13 and older than 72 with risk factors for HIV infection.  For pregnant patients, it is covered up to 3 times per pregnancy. Immunizations:  Immunization Recommendations   Influenza Vaccine Annual influenza vaccination during flu season is recommended for all persons aged >= 6 months who do not have contraindications   Pneumococcal Vaccine   * Pneumococcal conjugate vaccine = PCV13 (Prevnar 13), PCV15 (Vaxneuvance), PCV20 (Prevnar 20)  * Pneumococcal polysaccharide vaccine = PPSV23 (Pneumovax) Adults 20-63 years old: 1-3 doses may be recommended based on certain risk factors  Adults 72 years old: 1-2 doses may be recommended based off what pneumonia vaccine you previously received   Hepatitis B Vaccine 3 dose series if at intermediate or high risk (ex: diabetes, end stage renal disease, liver disease)   Tetanus (Td) Vaccine - COST NOT COVERED BY MEDICARE PART B Following completion of primary series, a booster dose should be given every 10 years to maintain immunity against tetanus. Td may also be given as tetanus wound prophylaxis. Tdap Vaccine - COST NOT COVERED BY MEDICARE PART B Recommended at least once for all adults. For pregnant patients, recommended with each pregnancy. Shingles Vaccine (Shingrix) - COST NOT COVERED BY MEDICARE PART B  2 shot series recommended in those aged 48 and above     Health Maintenance Due:      Topic Date Due   • DXA SCAN  01/24/2024   • Breast Cancer Screening: Mammogram  09/19/2024   • Colorectal Cancer Screening  01/28/2025   • Hepatitis C Screening  Completed     Immunizations Due:      Topic Date Due   • COVID-19 Vaccine (3 - Moderna series) 06/23/2021   • Influenza Vaccine (1) 09/01/2023     Advance Directives   What are advance directives? Advance directives are legal documents that state your wishes and plans for medical care. These plans are made ahead of time in case you lose your ability to make decisions for yourself.  Advance directives can apply to any medical decision, such as the treatments you want, and if you want to donate organs. What are the types of advance directives? There are many types of advance directives, and each state has rules about how to use them. You may choose a combination of any of the following:  · Living will: This is a written record of the treatment you want. You can also choose which treatments you do not want, which to limit, and which to stop at a certain time. This includes surgery, medicine, IV fluid, and tube feedings. · Durable power of  for healthcare [facility]): This is a written record that states who you want to make healthcare choices for you when you are unable to make them for yourself. This person, called a proxy, is usually a family member or a friend. You may choose more than 1 proxy. · Do not resuscitate (DNR) order:  A DNR order is used in case your heart stops beating or you stop breathing. It is a request not to have certain forms of treatment, such as CPR. A DNR order may be included in other types of advance directives. · Medical directive: This covers the care that you want if you are in a coma, near death, or unable to make decisions for yourself. You can list the treatments you want for each condition. Treatment may include pain medicine, surgery, blood transfusions, dialysis, IV or tube feedings, and a ventilator (breathing machine). · Values history: This document has questions about your views, beliefs, and how you feel and think about life. This information can help others choose the care that you would choose. Why are advance directives important? An advance directive helps you control your care. Although spoken wishes may be used, it is better to have your wishes written down. Spoken wishes can be misunderstood, or not followed. Treatments may be given even if you do not want them. An advance directive may make it easier for your family to make difficult choices about your care.    Weight Management   Why it is important to manage your weight:  Being overweight increases your risk of health conditions such as heart disease, high blood pressure, type 2 diabetes, and certain types of cancer. It can also increase your risk for osteoarthritis, sleep apnea, and other respiratory problems. Aim for a slow, steady weight loss. Even a small amount of weight loss can lower your risk of health problems. How to lose weight safely:  A safe and healthy way to lose weight is to eat fewer calories and get regular exercise. You can lose up about 1 pound a week by decreasing the number of calories you eat by 500 calories each day. Healthy meal plan for weight management:  A healthy meal plan includes a variety of foods, contains fewer calories, and helps you stay healthy. A healthy meal plan includes the following:  · Eat whole-grain foods more often. A healthy meal plan should contain fiber. Fiber is the part of grains, fruits, and vegetables that is not broken down by your body. Whole-grain foods are healthy and provide extra fiber in your diet. Some examples of whole-grain foods are whole-wheat breads and pastas, oatmeal, brown rice, and bulgur. · Eat a variety of vegetables every day. Include dark, leafy greens such as spinach, kale, món[place] greens, and mustard greens. Eat yellow and orange vegetables such as carrots, sweet potatoes, and winter squash. · Eat a variety of fruits every day. Choose fresh or canned fruit (canned in its own juice or light syrup) instead of juice. Fruit juice has very little or no fiber. · Eat low-fat dairy foods. Drink fat-free (skim) milk or 1% milk. Eat fat-free yogurt and low-fat cottage cheese. Try low-fat cheeses such as mozzarella and other reduced-fat cheeses. · Choose meat and other protein foods that are low in fat. Choose beans or other legumes such as split peas or lentils. Choose fish, skinless poultry (chicken or turkey), or lean cuts of red meat (beef or pork). Before you cook meat or poultry, cut off any visible fat.    · Use less fat and oil. Try baking foods instead of frying them. Add less fat, such as margarine, sour cream, regular salad dressing and mayonnaise to foods. Eat fewer high-fat foods. Some examples of high-fat foods include french fries, doughnuts, ice cream, and cakes. · Eat fewer sweets. Limit foods and drinks that are high in sugar. This includes candy, cookies, regular soda, and sweetened drinks. Exercise:  Exercise at least 30 minutes per day on most days of the week. Some examples of exercise include walking, biking, dancing, and swimming. You can also fit in more physical activity by taking the stairs instead of the elevator or parking farther away from stores. Ask your healthcare provider about the best exercise plan for you. © Copyright [employer] 2018 Information is for End User's use only and may not be sold, redistributed or otherwise used for commercial purposes.  All illustrations and images included in CareNotes® are the copyrighted property of [employer] Inc[employer] or [address] [place] [address]

## 2023-10-23 DIAGNOSIS — Z01.818 PREOP TESTING: Primary | ICD-10-CM

## 2023-10-23 RX ORDER — DICLOFENAC SODIUM 75 MG/1
75 TABLET, DELAYED RELEASE ORAL 2 TIMES DAILY
COMMUNITY
Start: 2023-10-23

## 2023-10-24 ENCOUNTER — APPOINTMENT (OUTPATIENT)
Dept: LAB | Facility: IMAGING CENTER | Age: 78
End: 2023-10-24
Payer: COMMERCIAL

## 2023-10-24 DIAGNOSIS — Z01.818 PREOP TESTING: ICD-10-CM

## 2023-10-24 LAB
ALBUMIN SERPL BCP-MCNC: 4 G/DL (ref 3.5–5)
ALP SERPL-CCNC: 105 U/L (ref 34–104)
ALT SERPL W P-5'-P-CCNC: 16 U/L (ref 7–52)
ANION GAP SERPL CALCULATED.3IONS-SCNC: 6 MMOL/L
AST SERPL W P-5'-P-CCNC: 15 U/L (ref 13–39)
BILIRUB SERPL-MCNC: 0.79 MG/DL (ref 0.2–1)
BUN SERPL-MCNC: 21 MG/DL (ref 5–25)
CALCIUM SERPL-MCNC: 9.5 MG/DL (ref 8.4–10.2)
CHLORIDE SERPL-SCNC: 104 MMOL/L (ref 96–108)
CO2 SERPL-SCNC: 29 MMOL/L (ref 21–32)
CREAT SERPL-MCNC: 0.8 MG/DL (ref 0.6–1.3)
GFR SERPL CREATININE-BSD FRML MDRD: 71 ML/MIN/1.73SQ M
GLUCOSE P FAST SERPL-MCNC: 143 MG/DL (ref 65–99)
POTASSIUM SERPL-SCNC: 4.2 MMOL/L (ref 3.5–5.3)
PROT SERPL-MCNC: 6.6 G/DL (ref 6.4–8.4)
SODIUM SERPL-SCNC: 139 MMOL/L (ref 135–147)

## 2023-10-24 PROCEDURE — 36415 COLL VENOUS BLD VENIPUNCTURE: CPT

## 2023-10-24 PROCEDURE — 80053 COMPREHEN METABOLIC PANEL: CPT

## 2023-10-30 ENCOUNTER — HOSPITAL ENCOUNTER (OUTPATIENT)
Dept: MRI IMAGING | Facility: HOSPITAL | Age: 78
Discharge: HOME | End: 2023-10-30
Payer: COMMERCIAL

## 2023-10-30 DIAGNOSIS — K86.89 OTHER SPECIFIED DISEASES OF PANCREAS: ICD-10-CM

## 2023-10-30 PROCEDURE — A9585 GADOBUTROL INJECTION: HCPCS | Performed by: FAMILY MEDICINE

## 2023-10-30 PROCEDURE — 74183 MRI ABD W/O CNTR FLWD CNTR: CPT

## 2023-10-30 RX ORDER — GADOBUTROL 604.72 MG/ML
8 INJECTION INTRAVENOUS
Status: COMPLETED | OUTPATIENT
Start: 2023-10-30 | End: 2023-10-30

## 2023-10-30 RX ADMIN — GADOBUTROL 8 ML: 604.72 INJECTION INTRAVENOUS at 12:18

## 2023-10-30 NOTE — LETTER
[address] [place] [address]  [address] [facility] [address]      November 8, 2023    MRN: [id number]     Phone: [phone]     Dear Ms. Daiana Rosen recently had a(n) MRI performed on 10/30/2023 at  [address] [place] [address] that was requested by Bibiana Alarcon MD. The study was reviewed by a radiologist, which is a physician who specializes in medical imaging. The radiologist issued a report describing his or her findings. In that report there was a finding that the radiologist felt warranted further discussion with your health care provider and that discussion would be beneficial to you. The results were sent to Bibiana Alarcon MD on 11/06/2023  7:47 AM. We recommend that you contact Bibiana Alarcon MD at [phone] or set up an appointment to discuss the results of the imaging test. If you have already heard from Bibiana Alarcon MD regarding the results of your study, you can disregard this letter. This letter is not meant to alarm you, but intended to encourage you to follow-up on your results with the provider that sent you for the imaging study. In addition, we have enclosed answers to frequently asked questions by other patients who have also received a letter to review results with their health care provider (see page two). Thank you for choosing [address] [place] [address] for your medical imaging needs. FREQUENTLY ASKED QUESTIONS    Why am I receiving this letter? [facility] requires us to notify patients who have findings on imaging exams that may require more testing or follow-up with a health professional within the next 3 months. How serious is the finding on the imaging test?  This letter is sent to all patients who may need follow-up or more testing within the next 3 months. Receiving this letter does not necessarily mean you have a life-threatening imaging finding or disease. Recommendations in the radiologist’s imaging report are general in nature and it is up to your healthcare provider to say whether those recommendations make sense for your situation. You are strongly encouraged to talk to your health care provider about the results and ask whether additional steps need to be taken. Where can I get a copy of the final report for my recent radiology exam?  To get a full copy of the report you can access your records online at [email or web address] or please contact [facility] at [phone] Monday through Friday between 8 am and 6 pm.         What do I need to do now? Please contact your health care provider who requested the imaging study to discuss what further actions (if any) are needed. You may have already heard from (your ordering provider) in regard to this test in which case you can disregard this letter. NOTICE IN ACCORDANCE WITH THE [place] STATE “PATIENT TEST RESULT INFORMATION ACT OF 2018”    You are receiving this notice as a result of a determination by your diagnostic imaging service that further discussions of your test results are warranted and would be beneficial to you. The complete results of your test or tests have been or will be sent to the health care practitioner that ordered the test or tests. It is recommended that you contact your health care practitioner to discuss your results as soon as possible.

## 2023-11-08 NOTE — RESULT ENCOUNTER NOTE
Please call patient  MRI of pancreas showed:  Unchanged dominant 13 mm pancreatic cyst.  followup every 2 year for 5 times or to age 82, whichever comes first.

## 2023-12-04 PROBLEM — Z00.00 MEDICARE ANNUAL WELLNESS VISIT, SUBSEQUENT: Status: RESOLVED | Noted: 2020-01-22 | Resolved: 2023-12-04

## 2023-12-22 ENCOUNTER — APPOINTMENT (OUTPATIENT)
Dept: LAB | Facility: IMAGING CENTER | Age: 78
End: 2023-12-22
Payer: COMMERCIAL

## 2023-12-22 DIAGNOSIS — N18.2 CHRONIC KIDNEY DISEASE, STAGE II (MILD): ICD-10-CM

## 2023-12-22 DIAGNOSIS — E03.8 TOXIC DIFFUSE GOITER WITH PRETIBIAL MYXEDEMA: ICD-10-CM

## 2023-12-22 DIAGNOSIS — E11.69 DIABETES MELLITUS ASSOCIATED WITH HORMONAL ETIOLOGY (HCC): ICD-10-CM

## 2023-12-22 DIAGNOSIS — E05.00 TOXIC DIFFUSE GOITER WITH PRETIBIAL MYXEDEMA: ICD-10-CM

## 2023-12-22 DIAGNOSIS — N25.81 SECONDARY HYPERPARATHYROIDISM OF RENAL ORIGIN (HCC): ICD-10-CM

## 2023-12-22 LAB
EST. AVERAGE GLUCOSE BLD GHB EST-MCNC: 235 MG/DL
HBA1C MFR BLD: 9.8 %
PTH-INTACT SERPL-MCNC: 175.6 PG/ML (ref 12–88)
T4 FREE SERPL-MCNC: 1.59 NG/DL (ref 0.61–1.12)
TSH SERPL DL<=0.05 MIU/L-ACNC: 2.29 UIU/ML (ref 0.45–4.5)

## 2023-12-22 PROCEDURE — 36415 COLL VENOUS BLD VENIPUNCTURE: CPT

## 2023-12-22 PROCEDURE — 80061 LIPID PANEL: CPT

## 2023-12-22 PROCEDURE — 84443 ASSAY THYROID STIM HORMONE: CPT

## 2023-12-22 PROCEDURE — 80053 COMPREHEN METABOLIC PANEL: CPT

## 2023-12-22 PROCEDURE — 83970 ASSAY OF PARATHORMONE: CPT

## 2023-12-22 PROCEDURE — 84439 ASSAY OF FREE THYROXINE: CPT

## 2023-12-22 PROCEDURE — 83036 HEMOGLOBIN GLYCOSYLATED A1C: CPT

## 2023-12-23 LAB
ALBUMIN SERPL BCP-MCNC: 3.9 G/DL (ref 3.5–5)
ALP SERPL-CCNC: 101 U/L (ref 34–104)
ALT SERPL W P-5'-P-CCNC: 15 U/L (ref 7–52)
ANION GAP SERPL CALCULATED.3IONS-SCNC: 16 MMOL/L
AST SERPL W P-5'-P-CCNC: 17 U/L (ref 13–39)
BILIRUB SERPL-MCNC: 0.94 MG/DL (ref 0.2–1)
BUN SERPL-MCNC: 14 MG/DL (ref 5–25)
CALCIUM SERPL-MCNC: 10.2 MG/DL (ref 8.4–10.2)
CHLORIDE SERPL-SCNC: 101 MMOL/L (ref 96–108)
CHOLEST SERPL-MCNC: 173 MG/DL
CO2 SERPL-SCNC: 22 MMOL/L (ref 21–32)
CREAT SERPL-MCNC: 0.76 MG/DL (ref 0.6–1.3)
GFR SERPL CREATININE-BSD FRML MDRD: 76 ML/MIN/1.73SQ M
GLUCOSE P FAST SERPL-MCNC: 207 MG/DL (ref 65–99)
HDLC SERPL-MCNC: 65 MG/DL
LDLC SERPL CALC-MCNC: 81 MG/DL (ref 0–100)
NONHDLC SERPL-MCNC: 108 MG/DL
POTASSIUM SERPL-SCNC: 4.1 MMOL/L (ref 3.5–5.3)
PROT SERPL-MCNC: 7 G/DL (ref 6.4–8.4)
SODIUM SERPL-SCNC: 139 MMOL/L (ref 135–147)
TRIGL SERPL-MCNC: 135 MG/DL

## 2024-03-05 NOTE — ASSESSMENT & PLAN NOTE
Lab Results   Component Value Date    HGBA1C 7 5 (H) 11/27/2018       No results for input(s): POCGLU in the last 72 hours  Blood Sugar Average: Last 72 hrs:     Pt to continue on metformin, amaryl, farxiga and dulaglutide injections  No changes made to regiment  Advised to keep appointment every 3-6 months for DM check  No tussin given as this can raise her blood sugar  Discussed with patient and pt ok with benzonatate for cough  Reassessment: pt states she is not feeling any pain in ED. Suggested tylenol and ibuprofen as needed for pain. Pt and pt's son is aware of kidney stone on CT abdomen/pelvis. recommend f/u with urology. Pt tolerated PO well prior to d/c.

## 2024-03-23 ENCOUNTER — APPOINTMENT (OUTPATIENT)
Dept: LAB | Facility: IMAGING CENTER | Age: 79
End: 2024-03-23
Payer: COMMERCIAL

## 2024-03-23 DIAGNOSIS — E11.69 DIABETES MELLITUS ASSOCIATED WITH HORMONAL ETIOLOGY (HCC): ICD-10-CM

## 2024-03-23 DIAGNOSIS — E55.9 AVITAMINOSIS D: ICD-10-CM

## 2024-03-23 DIAGNOSIS — I10 ESSENTIAL HYPERTENSION, MALIGNANT: ICD-10-CM

## 2024-03-23 DIAGNOSIS — E05.00 TOXIC DIFFUSE GOITER WITH PRETIBIAL MYXEDEMA: ICD-10-CM

## 2024-03-23 DIAGNOSIS — E03.8 TOXIC DIFFUSE GOITER WITH PRETIBIAL MYXEDEMA: ICD-10-CM

## 2024-03-23 LAB
25(OH)D3 SERPL-MCNC: 40.8 NG/ML (ref 30–100)
ALBUMIN SERPL BCP-MCNC: 4 G/DL (ref 3.5–5)
ALP SERPL-CCNC: 94 U/L (ref 34–104)
ALT SERPL W P-5'-P-CCNC: 9 U/L (ref 7–52)
ANION GAP SERPL CALCULATED.3IONS-SCNC: 8 MMOL/L (ref 4–13)
AST SERPL W P-5'-P-CCNC: 13 U/L (ref 13–39)
BASOPHILS # BLD AUTO: 0.06 THOUSANDS/ÂΜL (ref 0–0.1)
BASOPHILS NFR BLD AUTO: 1 % (ref 0–1)
BILIRUB SERPL-MCNC: 0.95 MG/DL (ref 0.2–1)
BUN SERPL-MCNC: 17 MG/DL (ref 5–25)
CALCIUM SERPL-MCNC: 9.8 MG/DL (ref 8.4–10.2)
CHLORIDE SERPL-SCNC: 105 MMOL/L (ref 96–108)
CO2 SERPL-SCNC: 27 MMOL/L (ref 21–32)
CREAT SERPL-MCNC: 0.65 MG/DL (ref 0.6–1.3)
CREAT UR-MCNC: 77.8 MG/DL
EOSINOPHIL # BLD AUTO: 0.21 THOUSAND/ÂΜL (ref 0–0.61)
EOSINOPHIL NFR BLD AUTO: 3 % (ref 0–6)
ERYTHROCYTE [DISTWIDTH] IN BLOOD BY AUTOMATED COUNT: 13.6 % (ref 11.6–15.1)
EST. AVERAGE GLUCOSE BLD GHB EST-MCNC: 171 MG/DL
GFR SERPL CREATININE-BSD FRML MDRD: 86 ML/MIN/1.73SQ M
GLUCOSE P FAST SERPL-MCNC: 119 MG/DL (ref 65–99)
HBA1C MFR BLD: 7.6 %
HCT VFR BLD AUTO: 45.9 % (ref 34.8–46.1)
HGB BLD-MCNC: 14.5 G/DL (ref 11.5–15.4)
IMM GRANULOCYTES # BLD AUTO: 0.03 THOUSAND/UL (ref 0–0.2)
IMM GRANULOCYTES NFR BLD AUTO: 0 % (ref 0–2)
LYMPHOCYTES # BLD AUTO: 2.7 THOUSANDS/ÂΜL (ref 0.6–4.47)
LYMPHOCYTES NFR BLD AUTO: 37 % (ref 14–44)
MCH RBC QN AUTO: 27.8 PG (ref 26.8–34.3)
MCHC RBC AUTO-ENTMCNC: 31.6 G/DL (ref 31.4–37.4)
MCV RBC AUTO: 88 FL (ref 82–98)
MICROALBUMIN UR-MCNC: <7 MG/L
MICROALBUMIN/CREAT 24H UR: <9 MG/G CREATININE (ref 0–30)
MONOCYTES # BLD AUTO: 0.63 THOUSAND/ÂΜL (ref 0.17–1.22)
MONOCYTES NFR BLD AUTO: 9 % (ref 4–12)
NEUTROPHILS # BLD AUTO: 3.68 THOUSANDS/ÂΜL (ref 1.85–7.62)
NEUTS SEG NFR BLD AUTO: 50 % (ref 43–75)
NRBC BLD AUTO-RTO: 0 /100 WBCS
PLATELET # BLD AUTO: 297 THOUSANDS/UL (ref 149–390)
PMV BLD AUTO: 11 FL (ref 8.9–12.7)
POTASSIUM SERPL-SCNC: 4.3 MMOL/L (ref 3.5–5.3)
PROT SERPL-MCNC: 6.4 G/DL (ref 6.4–8.4)
RBC # BLD AUTO: 5.22 MILLION/UL (ref 3.81–5.12)
SODIUM SERPL-SCNC: 140 MMOL/L (ref 135–147)
T4 FREE SERPL-MCNC: 1.05 NG/DL (ref 0.61–1.12)
TSH SERPL DL<=0.05 MIU/L-ACNC: 1.72 UIU/ML (ref 0.45–4.5)
WBC # BLD AUTO: 7.31 THOUSAND/UL (ref 4.31–10.16)

## 2024-03-23 PROCEDURE — 83036 HEMOGLOBIN GLYCOSYLATED A1C: CPT

## 2024-03-23 PROCEDURE — 80053 COMPREHEN METABOLIC PANEL: CPT

## 2024-03-23 PROCEDURE — 82306 VITAMIN D 25 HYDROXY: CPT

## 2024-03-23 PROCEDURE — 36415 COLL VENOUS BLD VENIPUNCTURE: CPT

## 2024-03-23 PROCEDURE — 84443 ASSAY THYROID STIM HORMONE: CPT

## 2024-03-23 PROCEDURE — 85025 COMPLETE CBC W/AUTO DIFF WBC: CPT

## 2024-03-23 PROCEDURE — 82043 UR ALBUMIN QUANTITATIVE: CPT

## 2024-03-23 PROCEDURE — 82570 ASSAY OF URINE CREATININE: CPT

## 2024-03-23 PROCEDURE — 84439 ASSAY OF FREE THYROXINE: CPT

## 2024-05-31 ENCOUNTER — HOSPITAL ENCOUNTER (OUTPATIENT)
Dept: BONE DENSITY | Facility: CLINIC | Age: 79
End: 2024-05-31
Payer: COMMERCIAL

## 2024-05-31 DIAGNOSIS — M85.852 OTHER SPECIFIED DISORDERS OF BONE DENSITY AND STRUCTURE, LEFT THIGH: ICD-10-CM

## 2024-05-31 DIAGNOSIS — Z13.820 SCREENING FOR OSTEOPOROSIS: ICD-10-CM

## 2024-05-31 PROCEDURE — 77080 DXA BONE DENSITY AXIAL: CPT

## 2024-06-05 DIAGNOSIS — E11.9 TYPE 2 DIABETES MELLITUS WITHOUT COMPLICATION, WITHOUT LONG-TERM CURRENT USE OF INSULIN (HCC): ICD-10-CM

## 2024-07-01 ENCOUNTER — APPOINTMENT (OUTPATIENT)
Dept: LAB | Facility: IMAGING CENTER | Age: 79
End: 2024-07-01
Payer: COMMERCIAL

## 2024-07-01 DIAGNOSIS — E11.69 TYPE 2 DIABETES MELLITUS WITH OTHER SPECIFIED COMPLICATION, WITHOUT LONG-TERM CURRENT USE OF INSULIN (HCC): ICD-10-CM

## 2024-07-01 DIAGNOSIS — E78.5 DYSLIPIDEMIA: ICD-10-CM

## 2024-07-01 DIAGNOSIS — I10 ESSENTIAL HYPERTENSION, MALIGNANT: ICD-10-CM

## 2024-07-01 DIAGNOSIS — N18.2 CHRONIC RENAL DISEASE, STAGE II: ICD-10-CM

## 2024-07-01 LAB
ALBUMIN SERPL BCG-MCNC: 3.9 G/DL (ref 3.5–5)
ALP SERPL-CCNC: 102 U/L (ref 34–104)
ALT SERPL W P-5'-P-CCNC: 10 U/L (ref 7–52)
ANION GAP SERPL CALCULATED.3IONS-SCNC: 10 MMOL/L (ref 4–13)
AST SERPL W P-5'-P-CCNC: 14 U/L (ref 13–39)
BILIRUB SERPL-MCNC: 0.84 MG/DL (ref 0.2–1)
BUN SERPL-MCNC: 17 MG/DL (ref 5–25)
CALCIUM SERPL-MCNC: 10.1 MG/DL (ref 8.4–10.2)
CHLORIDE SERPL-SCNC: 102 MMOL/L (ref 96–108)
CHOLEST SERPL-MCNC: 166 MG/DL
CO2 SERPL-SCNC: 27 MMOL/L (ref 21–32)
CREAT SERPL-MCNC: 0.69 MG/DL (ref 0.6–1.3)
EST. AVERAGE GLUCOSE BLD GHB EST-MCNC: 169 MG/DL
GFR SERPL CREATININE-BSD FRML MDRD: 84 ML/MIN/1.73SQ M
GLUCOSE P FAST SERPL-MCNC: 144 MG/DL (ref 65–99)
HBA1C MFR BLD: 7.5 %
HDLC SERPL-MCNC: 54 MG/DL
LDLC SERPL CALC-MCNC: 68 MG/DL (ref 0–100)
NONHDLC SERPL-MCNC: 112 MG/DL
POTASSIUM SERPL-SCNC: 4.4 MMOL/L (ref 3.5–5.3)
PROT SERPL-MCNC: 6.5 G/DL (ref 6.4–8.4)
PTH-INTACT SERPL-MCNC: 111.5 PG/ML (ref 12–88)
SODIUM SERPL-SCNC: 139 MMOL/L (ref 135–147)
TRIGL SERPL-MCNC: 218 MG/DL

## 2024-07-01 PROCEDURE — 80061 LIPID PANEL: CPT

## 2024-07-01 PROCEDURE — 83036 HEMOGLOBIN GLYCOSYLATED A1C: CPT

## 2024-07-01 PROCEDURE — 36415 COLL VENOUS BLD VENIPUNCTURE: CPT

## 2024-07-01 PROCEDURE — 80053 COMPREHEN METABOLIC PANEL: CPT

## 2024-07-01 PROCEDURE — 83970 ASSAY OF PARATHORMONE: CPT

## 2024-09-25 ENCOUNTER — TELEPHONE (OUTPATIENT)
Age: 79
End: 2024-09-25

## 2024-09-25 DIAGNOSIS — Z12.31 BREAST CANCER SCREENING BY MAMMOGRAM: Primary | ICD-10-CM

## 2024-10-24 RX ORDER — BLOOD SUGAR DIAGNOSTIC
STRIP MISCELLANEOUS 3 TIMES DAILY
COMMUNITY
Start: 2024-09-24

## 2024-10-24 RX ORDER — CALCITRIOL 0.25 UG/1
0.25 CAPSULE, LIQUID FILLED ORAL DAILY
COMMUNITY
Start: 2024-07-03 | End: 2025-07-03

## 2024-10-28 ENCOUNTER — APPOINTMENT (OUTPATIENT)
Dept: LAB | Facility: IMAGING CENTER | Age: 79
End: 2024-10-28
Payer: COMMERCIAL

## 2024-10-28 DIAGNOSIS — E06.3 CHRONIC LYMPHOCYTIC THYROIDITIS: ICD-10-CM

## 2024-10-28 DIAGNOSIS — N25.81 SECONDARY HYPERPARATHYROIDISM OF RENAL ORIGIN (HCC): ICD-10-CM

## 2024-10-28 DIAGNOSIS — E11.69 DIABETES MELLITUS ASSOCIATED WITH HORMONAL ETIOLOGY (HCC): ICD-10-CM

## 2024-10-28 DIAGNOSIS — E55.9 AVITAMINOSIS D: ICD-10-CM

## 2024-10-28 DIAGNOSIS — E05.00 TOXIC DIFFUSE GOITER WITH PRETIBIAL MYXEDEMA: ICD-10-CM

## 2024-10-28 DIAGNOSIS — I10 ESSENTIAL HYPERTENSION, MALIGNANT: ICD-10-CM

## 2024-10-28 DIAGNOSIS — E03.8 TOXIC DIFFUSE GOITER WITH PRETIBIAL MYXEDEMA: ICD-10-CM

## 2024-10-28 LAB
25(OH)D3 SERPL-MCNC: 32.5 NG/ML (ref 30–100)
ALBUMIN SERPL BCG-MCNC: 4.1 G/DL (ref 3.5–5)
ALP SERPL-CCNC: 111 U/L (ref 34–104)
ALT SERPL W P-5'-P-CCNC: 10 U/L (ref 7–52)
ANION GAP SERPL CALCULATED.3IONS-SCNC: 8 MMOL/L (ref 4–13)
AST SERPL W P-5'-P-CCNC: 10 U/L (ref 13–39)
BASOPHILS # BLD AUTO: 0.07 THOUSANDS/ΜL (ref 0–0.1)
BASOPHILS NFR BLD AUTO: 1 % (ref 0–1)
BILIRUB SERPL-MCNC: 1.05 MG/DL (ref 0.2–1)
BUN SERPL-MCNC: 19 MG/DL (ref 5–25)
CALCIUM SERPL-MCNC: 9.9 MG/DL (ref 8.4–10.2)
CHLORIDE SERPL-SCNC: 104 MMOL/L (ref 96–108)
CO2 SERPL-SCNC: 29 MMOL/L (ref 21–32)
CREAT SERPL-MCNC: 0.72 MG/DL (ref 0.6–1.3)
EOSINOPHIL # BLD AUTO: 0.15 THOUSAND/ΜL (ref 0–0.61)
EOSINOPHIL NFR BLD AUTO: 2 % (ref 0–6)
ERYTHROCYTE [DISTWIDTH] IN BLOOD BY AUTOMATED COUNT: 14.3 % (ref 11.6–15.1)
GFR SERPL CREATININE-BSD FRML MDRD: 81 ML/MIN/1.73SQ M
GLUCOSE P FAST SERPL-MCNC: 203 MG/DL (ref 65–99)
HCT VFR BLD AUTO: 47.4 % (ref 34.8–46.1)
HGB BLD-MCNC: 14.8 G/DL (ref 11.5–15.4)
IMM GRANULOCYTES # BLD AUTO: 0.03 THOUSAND/UL (ref 0–0.2)
IMM GRANULOCYTES NFR BLD AUTO: 0 % (ref 0–2)
LYMPHOCYTES # BLD AUTO: 2.44 THOUSANDS/ΜL (ref 0.6–4.47)
LYMPHOCYTES NFR BLD AUTO: 35 % (ref 14–44)
MCH RBC QN AUTO: 27.4 PG (ref 26.8–34.3)
MCHC RBC AUTO-ENTMCNC: 31.2 G/DL (ref 31.4–37.4)
MCV RBC AUTO: 88 FL (ref 82–98)
MONOCYTES # BLD AUTO: 0.67 THOUSAND/ΜL (ref 0.17–1.22)
MONOCYTES NFR BLD AUTO: 10 % (ref 4–12)
NEUTROPHILS # BLD AUTO: 3.63 THOUSANDS/ΜL (ref 1.85–7.62)
NEUTS SEG NFR BLD AUTO: 52 % (ref 43–75)
NRBC BLD AUTO-RTO: 0 /100 WBCS
PLATELET # BLD AUTO: 304 THOUSANDS/UL (ref 149–390)
PMV BLD AUTO: 11.8 FL (ref 8.9–12.7)
POTASSIUM SERPL-SCNC: 4.5 MMOL/L (ref 3.5–5.3)
PROT SERPL-MCNC: 6.8 G/DL (ref 6.4–8.4)
PTH-INTACT SERPL-MCNC: 135.6 PG/ML (ref 12–88)
RBC # BLD AUTO: 5.41 MILLION/UL (ref 3.81–5.12)
SODIUM SERPL-SCNC: 141 MMOL/L (ref 135–147)
T4 FREE SERPL-MCNC: 1.11 NG/DL (ref 0.61–1.12)
TSH SERPL DL<=0.05 MIU/L-ACNC: 1.91 UIU/ML (ref 0.45–4.5)
WBC # BLD AUTO: 6.99 THOUSAND/UL (ref 4.31–10.16)

## 2024-10-28 PROCEDURE — 80053 COMPREHEN METABOLIC PANEL: CPT

## 2024-10-28 PROCEDURE — 85025 COMPLETE CBC W/AUTO DIFF WBC: CPT

## 2024-10-28 PROCEDURE — 36415 COLL VENOUS BLD VENIPUNCTURE: CPT

## 2024-10-28 PROCEDURE — 83036 HEMOGLOBIN GLYCOSYLATED A1C: CPT

## 2024-10-28 PROCEDURE — 82306 VITAMIN D 25 HYDROXY: CPT

## 2024-10-28 PROCEDURE — 84439 ASSAY OF FREE THYROXINE: CPT

## 2024-10-28 PROCEDURE — 84443 ASSAY THYROID STIM HORMONE: CPT

## 2024-10-28 PROCEDURE — 83970 ASSAY OF PARATHORMONE: CPT

## 2024-10-29 ENCOUNTER — OFFICE VISIT (OUTPATIENT)
Dept: FAMILY MEDICINE CLINIC | Facility: CLINIC | Age: 79
End: 2024-10-29
Payer: COMMERCIAL

## 2024-10-29 VITALS
HEART RATE: 59 BPM | OXYGEN SATURATION: 98 % | WEIGHT: 184.2 LBS | RESPIRATION RATE: 17 BRPM | SYSTOLIC BLOOD PRESSURE: 118 MMHG | HEIGHT: 57 IN | DIASTOLIC BLOOD PRESSURE: 56 MMHG | BODY MASS INDEX: 39.74 KG/M2 | TEMPERATURE: 97.2 F

## 2024-10-29 DIAGNOSIS — E03.9 ACQUIRED HYPOTHYROIDISM: ICD-10-CM

## 2024-10-29 DIAGNOSIS — Z00.00 MEDICARE ANNUAL WELLNESS VISIT, SUBSEQUENT: Primary | ICD-10-CM

## 2024-10-29 DIAGNOSIS — Z23 NEED FOR COVID-19 VACCINE: ICD-10-CM

## 2024-10-29 DIAGNOSIS — K42.9 UMBILICAL HERNIA WITHOUT OBSTRUCTION AND WITHOUT GANGRENE: ICD-10-CM

## 2024-10-29 DIAGNOSIS — E11.9 TYPE 2 DIABETES MELLITUS WITHOUT COMPLICATION, WITHOUT LONG-TERM CURRENT USE OF INSULIN (HCC): ICD-10-CM

## 2024-10-29 DIAGNOSIS — I10 BENIGN ESSENTIAL HTN: ICD-10-CM

## 2024-10-29 DIAGNOSIS — K21.9 GASTROESOPHAGEAL REFLUX DISEASE WITHOUT ESOPHAGITIS: ICD-10-CM

## 2024-10-29 DIAGNOSIS — Z23 ENCOUNTER FOR IMMUNIZATION: ICD-10-CM

## 2024-10-29 LAB
EST. AVERAGE GLUCOSE BLD GHB EST-MCNC: 203 MG/DL
HBA1C MFR BLD: 8.7 %

## 2024-10-29 PROCEDURE — G0439 PPPS, SUBSEQ VISIT: HCPCS

## 2024-10-29 PROCEDURE — G0008 ADMIN INFLUENZA VIRUS VAC: HCPCS

## 2024-10-29 PROCEDURE — 99214 OFFICE O/P EST MOD 30 MIN: CPT

## 2024-10-29 PROCEDURE — 90480 ADMN SARSCOV2 VAC 1/ONLY CMP: CPT

## 2024-10-29 PROCEDURE — 91320 SARSCV2 VAC 30MCG TRS-SUC IM: CPT

## 2024-10-29 PROCEDURE — 90662 IIV NO PRSV INCREASED AG IM: CPT

## 2024-10-29 NOTE — ASSESSMENT & PLAN NOTE
Symptoms controlled with as needed OTC medication.  Reviewed plan below .    Plan  - Avoid gastric irritants: tomato products, caffeine (coffee/tea), chocolate, alcohol, smoking, NSAIDS, and spicy foods.  - Avoid eating 2-3 hrs before bed. Raise the head of bed, 6 inches, using books or blocks.  - Use the medication to decrease acid secretion, as prescribed.  - Can use antacid tablets, every so often, for episodic heartburn flares.

## 2024-10-29 NOTE — PATIENT INSTRUCTIONS
Medicare Preventive Visit Patient Instructions  Thank you for completing your Welcome to Medicare Visit or Medicare Annual Wellness Visit today. Your next wellness visit will be due in one year (10/30/2025).  The screening/preventive services that you may require over the next 5-10 years are detailed below. Some tests may not apply to you based off risk factors and/or age. Screening tests ordered at today's visit but not completed yet may show as past due. Also, please note that scanned in results may not display below.  Preventive Screenings:  Service Recommendations Previous Testing/Comments   Colorectal Cancer Screening  * Colonoscopy    * Fecal Occult Blood Test (FOBT)/Fecal Immunochemical Test (FIT)  * Fecal DNA/Cologuard Test  * Flexible Sigmoidoscopy Age: 45-75 years old   Colonoscopy: every 10 years (may be performed more frequently if at higher risk)  OR  FOBT/FIT: every 1 year  OR  Cologuard: every 3 years  OR  Sigmoidoscopy: every 5 years  Screening may be recommended earlier than age 45 if at higher risk for colorectal cancer. Also, an individualized decision between you and your healthcare provider will decide whether screening between the ages of 76-85 would be appropriate. Colonoscopy: 01/28/2020  FOBT/FIT: Not on file  Cologuard: Not on file  Sigmoidoscopy: Not on file    Screening Current     Breast Cancer Screening Age: 40+ years old  Frequency: every 1-2 years  Not required if history of left and right mastectomy Mammogram: 09/19/2023    Screening Current   Cervical Cancer Screening Between the ages of 21-29, pap smear recommended once every 3 years.   Between the ages of 30-65, can perform pap smear with HPV co-testing every 5 years.   Recommendations may differ for women with a history of total hysterectomy, cervical cancer, or abnormal pap smears in past. Pap Smear: Not on file    Screening Not Indicated   Hepatitis C Screening Once for adults born between 1945 and 1965  More frequently in  patients at high risk for Hepatitis C Hep C Antibody: 06/03/2019    Screening Current   Diabetes Screening 1-2 times per year if you're at risk for diabetes or have pre-diabetes Fasting glucose: 203 mg/dL (10/28/2024)  A1C: 8.7 % (10/28/2024)  Screening Not Indicated  History Diabetes   Cholesterol Screening Once every 5 years if you don't have a lipid disorder. May order more often based on risk factors. Lipid panel: 07/01/2024    Screening Not Indicated  History Lipid Disorder     Other Preventive Screenings Covered by Medicare:  Abdominal Aortic Aneurysm (AAA) Screening: covered once if your at risk. You're considered to be at risk if you have a family history of AAA.  Lung Cancer Screening: covers low dose CT scan once per year if you meet all of the following conditions: (1) Age 55-77; (2) No signs or symptoms of lung cancer; (3) Current smoker or have quit smoking within the last 15 years; (4) You have a tobacco smoking history of at least 20 pack years (packs per day multiplied by number of years you smoked); (5) You get a written order from a healthcare provider.  Glaucoma Screening: covered annually if you're considered high risk: (1) You have diabetes OR (2) Family history of glaucoma OR (3)  aged 50 and older OR (4)  American aged 65 and older  Osteoporosis Screening: covered every 2 years if you meet one of the following conditions: (1) You're estrogen deficient and at risk for osteoporosis based off medical history and other findings; (2) Have a vertebral abnormality; (3) On glucocorticoid therapy for more than 3 months; (4) Have primary hyperparathyroidism; (5) On osteoporosis medications and need to assess response to drug therapy.   Last bone density test (DXA Scan): 05/31/2024.  HIV Screening: covered annually if you're between the age of 15-65. Also covered annually if you are younger than 15 and older than 65 with risk factors for HIV infection. For pregnant patients, it is  covered up to 3 times per pregnancy.    Immunizations:  Immunization Recommendations   Influenza Vaccine Annual influenza vaccination during flu season is recommended for all persons aged >= 6 months who do not have contraindications   Pneumococcal Vaccine   * Pneumococcal conjugate vaccine = PCV13 (Prevnar 13), PCV15 (Vaxneuvance), PCV20 (Prevnar 20)  * Pneumococcal polysaccharide vaccine = PPSV23 (Pneumovax) Adults 19-65 yo with certain risk factors or if 65+ yo  If never received any pneumonia vaccine: recommend Prevnar 20 (PCV20)  Give PCV20 if previously received 1 dose of PCV13 or PPSV23   Hepatitis B Vaccine 3 dose series if at intermediate or high risk (ex: diabetes, end stage renal disease, liver disease)   Respiratory syncytial virus (RSV) Vaccine - COVERED BY MEDICARE PART D  * RSVPreF3 (Arexvy) CDC recommends that adults 60 years of age and older may receive a single dose of RSV vaccine using shared clinical decision-making (SCDM)   Tetanus (Td) Vaccine - COST NOT COVERED BY MEDICARE PART B Following completion of primary series, a booster dose should be given every 10 years to maintain immunity against tetanus. Td may also be given as tetanus wound prophylaxis.   Tdap Vaccine - COST NOT COVERED BY MEDICARE PART B Recommended at least once for all adults. For pregnant patients, recommended with each pregnancy.   Shingles Vaccine (Shingrix) - COST NOT COVERED BY MEDICARE PART B  2 shot series recommended in those 19 years and older who have or will have weakened immune systems or those 50 years and older     Health Maintenance Due:      Topic Date Due   • Breast Cancer Screening: Mammogram  09/19/2024   • Colorectal Cancer Screening  01/28/2025   • DXA SCAN  05/31/2026   • Hepatitis C Screening  Completed     Immunizations Due:      Topic Date Due   • Influenza Vaccine (1) 09/01/2024   • COVID-19 Vaccine (5 - 2023-24 season) 09/01/2024     Advance Directives   What are advance directives?  Advance  directives are legal documents that state your wishes and plans for medical care. These plans are made ahead of time in case you lose your ability to make decisions for yourself. Advance directives can apply to any medical decision, such as the treatments you want, and if you want to donate organs.   What are the types of advance directives?  There are many types of advance directives, and each state has rules about how to use them. You may choose a combination of any of the following:  Living will:  This is a written record of the treatment you want. You can also choose which treatments you do not want, which to limit, and which to stop at a certain time. This includes surgery, medicine, IV fluid, and tube feedings.   Durable power of  for healthcare (DPAHC):  This is a written record that states who you want to make healthcare choices for you when you are unable to make them for yourself. This person, called a proxy, is usually a family member or a friend. You may choose more than 1 proxy.  Do not resuscitate (DNR) order:  A DNR order is used in case your heart stops beating or you stop breathing. It is a request not to have certain forms of treatment, such as CPR. A DNR order may be included in other types of advance directives.  Medical directive:  This covers the care that you want if you are in a coma, near death, or unable to make decisions for yourself. You can list the treatments you want for each condition. Treatment may include pain medicine, surgery, blood transfusions, dialysis, IV or tube feedings, and a ventilator (breathing machine).  Values history:  This document has questions about your views, beliefs, and how you feel and think about life. This information can help others choose the care that you would choose.  Why are advance directives important?  An advance directive helps you control your care. Although spoken wishes may be used, it is better to have your wishes written down. Spoken  wishes can be misunderstood, or not followed. Treatments may be given even if you do not want them. An advance directive may make it easier for your family to make difficult choices about your care.   Urinary Incontinence   Urinary incontinence (UI)  is when you lose control of your bladder. UI develops because your bladder cannot store or empty urine properly. The 3 most common types of UI are stress incontinence, urge incontinence, or both.  Medicines:   May be given to help strengthen your bladder control. Report any side effects of medication to your healthcare provider.  Do pelvic muscle exercises often:  Your pelvic muscles help you stop urinating. Squeeze these muscles tight for 5 seconds, then relax for 5 seconds. Gradually work up to squeezing for 10 seconds. Do 3 sets of 15 repetitions a day, or as directed. This will help strengthen your pelvic muscles and improve bladder control.  Train your bladder:  Go to the bathroom at set times, such as every 2 hours, even if you do not feel the urge to go. You can also try to hold your urine when you feel the urge to go. For example, hold your urine for 5 minutes when you feel the urge to go. As that becomes easier, hold your urine for 10 minutes.   Self-care:   Keep a UI record.  Write down how often you leak urine and how much you leak. Make a note of what you were doing when you leaked urine.  Drink liquids as directed. You may need to limit the amount of liquid you drink to help control your urine leakage. Do not drink any liquid right before you go to bed. Limit or do not have drinks that contain caffeine or alcohol.   Prevent constipation.  Eat a variety of high-fiber foods. Good examples are high-fiber cereals, beans, vegetables, and whole-grain breads. Walking is the best way to trigger your intestines to have a bowel movement.  Exercise regularly and maintain a healthy weight.  Weight loss and exercise will decrease pressure on your bladder and help you  control your leakage.   Use a catheter as directed  to help empty your bladder. A catheter is a tiny, plastic tube that is put into your bladder to drain your urine.   Go to behavior therapy as directed.  Behavior therapy may be used to help you learn to control your urge to urinate.    Weight Management   Why it is important to manage your weight:  Being overweight increases your risk of health conditions such as heart disease, high blood pressure, type 2 diabetes, and certain types of cancer. It can also increase your risk for osteoarthritis, sleep apnea, and other respiratory problems. Aim for a slow, steady weight loss. Even a small amount of weight loss can lower your risk of health problems.  How to lose weight safely:  A safe and healthy way to lose weight is to eat fewer calories and get regular exercise. You can lose up about 1 pound a week by decreasing the number of calories you eat by 500 calories each day.   Healthy meal plan for weight management:  A healthy meal plan includes a variety of foods, contains fewer calories, and helps you stay healthy. A healthy meal plan includes the following:  Eat whole-grain foods more often.  A healthy meal plan should contain fiber. Fiber is the part of grains, fruits, and vegetables that is not broken down by your body. Whole-grain foods are healthy and provide extra fiber in your diet. Some examples of whole-grain foods are whole-wheat breads and pastas, oatmeal, brown rice, and bulgur.  Eat a variety of vegetables every day.  Include dark, leafy greens such as spinach, kale, món[place] greens, and mustard greens. Eat yellow and orange vegetables such as carrots, sweet potatoes, and winter squash.   Eat a variety of fruits every day.  Choose fresh or canned fruit (canned in its own juice or light syrup) instead of juice. Fruit juice has very little or no fiber.  Eat low-fat dairy foods.  Drink fat-free (skim) milk or 1% milk. Eat fat-free yogurt and low-fat cottage  cheese. Try low-fat cheeses such as mozzarella and other reduced-fat cheeses.  Choose meat and other protein foods that are low in fat.  Choose beans or other legumes such as split peas or lentils. Choose fish, skinless poultry (chicken or turkey), or lean cuts of red meat (beef or pork). Before you cook meat or poultry, cut off any visible fat.   Use less fat and oil.  Try baking foods instead of frying them. Add less fat, such as margarine, sour cream, regular salad dressing and mayonnaise to foods. Eat fewer high-fat foods. Some examples of high-fat foods include french fries, doughnuts, ice cream, and cakes.  Eat fewer sweets.  Limit foods and drinks that are high in sugar. This includes candy, cookies, regular soda, and sweetened drinks.  Exercise:  Exercise at least 30 minutes per day on most days of the week. Some examples of exercise include walking, biking, dancing, and swimming. You can also fit in more physical activity by taking the stairs instead of the elevator or parking farther away from stores. Ask your healthcare provider about the best exercise plan for you.      © Copyright [employer] 2018 Information is for End User's use only and may not be sold, redistributed or otherwise used for commercial purposes. All illustrations and images included in CareNotes® are the copyrighted property of [employer] or [employer]

## 2024-10-29 NOTE — ASSESSMENT & PLAN NOTE
Lab Results   Component Value Date    OXR2LVPALIKR 1.911 10/28/2024      Recent TSH reviewed and at goal.  Recommend continuing 75mcg on Sundays and 50mcg remaining 6 days.

## 2024-10-29 NOTE — PROGRESS NOTES
Ambulatory Visit  Name: Negrita Payton      : 1945      MRN: [id number]  Encounter Provider: Maximiliano Wyatt MD  Encounter Date: 10/29/2024   Encounter department: [facility] PRIMARY CARE [facility]    Assessment & Plan  Medicare annual wellness visit, subsequent         Type 2 diabetes mellitus without complication, without long-term current use of insulin (HCC)    Lab Results   Component Value Date    HGBA1C 8.7 (H) 10/28/2024     Increased from previous value.  Stressed importance of adherence to medication regimen as prescribed.  Continue with empagliflozin 25 mg daily, glimepiride 2 mg daily, metformin 500 mg twice daily, and Ozempic 2 mg weekly.  Again reviewed need to maintain healthy diet and exercise.  Reviewed healthy snack alternatives.  Lastly we will get iris exam today and will be reaching out to patient's ophthalmologist for medical records.         Benign essential HTN  BP Readings from Last 3 Encounters:   10/29/24 118/56   10/05/23 110/70   06/15/23 120/70      BP well-controlled.  Continue with enalapril 2.5 mg daily       Acquired hypothyroidism  Lab Results   Component Value Date    CXH0BWZRBANK 1.911 10/28/2024      Recent TSH reviewed and at goal.  Recommend continuing 75mcg on Sundays and 50mcg remaining 6 days.         Gastroesophageal reflux disease without esophagitis  Symptoms controlled with as needed OTC medication.  Reviewed plan below .    Plan  - Avoid gastric irritants: tomato products, caffeine (coffee/tea), chocolate, alcohol, smoking, NSAIDS, and spicy foods.  - Avoid eating 2-3 hrs before bed. Raise the head of bed, 6 inches, using books or blocks.  - Use the medication to decrease acid secretion, as prescribed.  - Can use antacid tablets, every so often, for episodic heartburn flares.        Umbilical hernia without obstruction and without gangrene  Reviewed ED precautions, not limited to: Erythema, swelling, severe pain from area.  Will treat  conservatively and continue to monitor.       Encounter for immunization         Need for COVID-19 vaccine            Preventive health issues were discussed with patient, and age appropriate screening tests were ordered as noted in patient's After Visit Summary. Personalized health advice and appropriate referrals for health education or preventive services given if needed, as noted in patient's After Visit Summary.    History of Present Illness     HPI   Patient Care Team:  Sumanth Reddy MD as PCP - General (Family Medicine)    Review of Systems   Constitutional:  Negative for chills and fever.   Respiratory:  Negative for cough, chest tightness and shortness of breath.    Cardiovascular:  Negative for chest pain and palpitations.   Gastrointestinal:  Negative for abdominal pain, anal bleeding, blood in stool, constipation, diarrhea, nausea and vomiting.   Genitourinary:  Negative for dysuria and hematuria.   Neurological:  Negative for dizziness, syncope, weakness, numbness and headaches.     Medical History Reviewed by provider this encounter:       Annual Wellness Visit Questionnaire   Negrita is here for her Subsequent Wellness visit. Last Medicare Wellness visit information reviewed, patient interviewed and updates made to the record today.      Health Risk Assessment:   Patient rates overall health as good. Patient feels that their physical health rating is same. Patient is satisfied with their life. Eyesight was rated as same. Hearing was rated as slightly worse. Patient feels that their emotional and mental health rating is slightly better. Patients states they are never, rarely angry. Patient states they are sometimes unusually tired/fatigued. Pain experienced in the last 7 days has been none. Patient states that she has experienced no weight loss or gain in last 6 months. Pt does need hearing aid but does not like wearing.    Depression Screening:   PHQ-2 Score: 0      Fall Risk Screening:   In the past  year, patient has experienced: no history of falling in past year      Urinary Incontinence Screening:   Patient has leaked urine accidently in the last six months. Held in urine for long time and didn't make it to bathroom.    Home Safety:  Patient does not have trouble with stairs inside or outside of their home. Patient has working smoke alarms and has working carbon monoxide detector. Home safety hazards include: none.     Nutrition:   Current diet is Regular.     Medications:   Patient is not currently taking any over-the-counter supplements. Patient is able to manage medications.     Activities of Daily Living (ADLs)/Instrumental Activities of Daily Living (IADLs):   Walk and transfer into and out of bed and chair?: Yes  Dress and groom yourself?: Yes    Bathe or shower yourself?: Yes    Feed yourself? Yes  Do your laundry/housekeeping?: Yes  Manage your money, pay your bills and track your expenses?: Yes  Make your own meals?: Yes    Do your own shopping?: Yes    Previous Hospitalizations:   Any hospitalizations or ED visits within the last 12 months?: No      Advance Care Planning:   Living will: Yes    Advanced directive: Yes    Advanced directive counseling given: Yes    ACP document given: Yes      Cognitive Screening:   Provider or family/friend/caregiver concerned regarding cognition?: No    PREVENTIVE SCREENINGS      Cardiovascular Screening:    General: Screening Not Indicated and History Lipid Disorder      Diabetes Screening:     General: Screening Not Indicated and History Diabetes      Colorectal Cancer Screening:     General: Screening Current      Breast Cancer Screening:     General: Screening Current      Cervical Cancer Screening:    General: Screening Not Indicated      Osteoporosis Screening:    General: Screening Current      Abdominal Aortic Aneurysm (AAA) Screening:        General: Screening Not Indicated      Lung Cancer Screening:     General: Screening Not Indicated      Hepatitis C  Screening:    General: Screening Current    Screening, Brief Intervention, and Referral to Treatment (SBIRT)    Screening  Typical number of drinks in a day: 0    Brief Intervention  Alcohol & drug use screenings were reviewed. No concerns regarding substance use disorder identified.     Other Counseling Topics:   Car/seat belt/driving safety, skin self-exam, sunscreen and calcium and vitamin D intake and regular weightbearing exercise.     Social Determinants of Health     Financial Resource Strain: Low Risk  (10/5/2023)    Overall Financial Resource Strain (CARDIA)     Difficulty of Paying Living Expenses: Not hard at all   Transportation Needs: No Transportation Needs (10/5/2023)    PRAPARE - Transportation     Lack of Transportation (Medical): No     Lack of Transportation (Non-Medical): No     No results found.    Objective     There were no vitals taken for this visit.    Physical Exam  Vitals and nursing note reviewed.   Constitutional:       Appearance: Normal appearance.   HENT:      Head: Normocephalic.      Right Ear: Tympanic membrane, ear canal and external ear normal.      Left Ear: Tympanic membrane, ear canal and external ear normal.      Nose: Nose normal.      Mouth/Throat:      Mouth: Mucous membranes are moist.      Pharynx: Oropharynx is clear.   Eyes:      Extraocular Movements: Extraocular movements intact.      Conjunctiva/sclera: Conjunctivae normal.      Pupils: Pupils are equal, round, and reactive to light.   Cardiovascular:      Rate and Rhythm: Normal rate and regular rhythm.      Pulses: Normal pulses.      Heart sounds: Normal heart sounds.   Pulmonary:      Effort: Pulmonary effort is normal.      Breath sounds: Normal breath sounds.   Abdominal:      General: Abdomen is flat. There is no distension.      Palpations: Abdomen is soft.      Tenderness: There is no abdominal tenderness.      Hernia: A hernia is present. Hernia is present in the umbilical area.      Comments: Umbilical  hernia reducible nontender to palpation.   Musculoskeletal:         General: Normal range of motion.      Cervical back: Normal range of motion and neck supple.   Skin:     General: Skin is warm and dry.      Capillary Refill: Capillary refill takes less than 2 seconds.   Neurological:      General: No focal deficit present.      Mental Status: She is alert.   Psychiatric:         Mood and Affect: Mood normal.         Behavior: Behavior normal.

## 2024-10-29 NOTE — ASSESSMENT & PLAN NOTE
Lab Results   Component Value Date    HGBA1C 8.7 (H) 10/28/2024     Increased from previous value.  Stressed importance of adherence to medication regimen as prescribed.  Continue with empagliflozin 25 mg daily, glimepiride 2 mg daily, metformin 500 mg twice daily, and Ozempic 2 mg weekly.  Again reviewed need to maintain healthy diet and exercise.  Reviewed healthy snack alternatives.  Lastly we will get iris exam today and will be reaching out to patient's ophthalmologist for medical records.

## 2024-10-29 NOTE — ASSESSMENT & PLAN NOTE
BP Readings from Last 3 Encounters:   10/29/24 118/56   10/05/23 110/70   06/15/23 120/70      BP well-controlled.  Continue with enalapril 2.5 mg daily

## 2024-11-04 ENCOUNTER — HOSPITAL ENCOUNTER (OUTPATIENT)
Dept: RADIOLOGY | Facility: IMAGING CENTER | Age: 79
Discharge: HOME | End: 2024-11-04

## 2024-11-04 VITALS — WEIGHT: 181 LBS | BODY MASS INDEX: 37.99 KG/M2 | HEIGHT: 58 IN

## 2024-11-04 DIAGNOSIS — Z12.31 BREAST CANCER SCREENING BY MAMMOGRAM: ICD-10-CM

## 2024-12-13 ENCOUNTER — HOSPITAL ENCOUNTER (OUTPATIENT)
Dept: RADIOLOGY | Facility: IMAGING CENTER | Age: 79
End: 2024-12-13
Payer: COMMERCIAL

## 2024-12-13 VITALS — BODY MASS INDEX: 37.99 KG/M2 | HEIGHT: 58 IN | WEIGHT: 181 LBS

## 2024-12-13 PROCEDURE — 77067 SCR MAMMO BI INCL CAD: CPT

## 2024-12-13 PROCEDURE — 77063 BREAST TOMOSYNTHESIS BI: CPT

## 2025-05-08 ENCOUNTER — APPOINTMENT (OUTPATIENT)
Dept: LAB | Facility: IMAGING CENTER | Age: 80
End: 2025-05-08
Payer: COMMERCIAL

## (undated) DEVICE — SUT MONOCRYL 4-0 PS-2 27 IN Y426H

## (undated) DEVICE — CHLORAPREP HI-LITE 26ML ORANGE

## (undated) DEVICE — SAW BLADE RECIPROCATING 179

## (undated) DEVICE — TIBURON LAPAROSCOPIC ABDOMINAL DRAPE: Brand: CONVERTORS

## (undated) DEVICE — SCD SEQUENTIAL COMPRESSION COMFORT SLEEVE MEDIUM KNEE LENGTH: Brand: KENDALL SCD

## (undated) DEVICE — SYRINGE 20ML LL

## (undated) DEVICE — TIP COVER ACCESSORY

## (undated) DEVICE — TRAY FOLEY 16FR URIMETER SURESTEP

## (undated) DEVICE — BAG DECANTER

## (undated) DEVICE — WEBRIL 6 IN UNSTERILE

## (undated) DEVICE — ADHESIVE SKIN CLSR DERMABOND NX

## (undated) DEVICE — LUBRICANT INST ELECTROLUBE ANTISTK WO PAD

## (undated) DEVICE — 10FR FRAZIER SUCTION HANDLE: Brand: CARDINAL HEALTH

## (undated) DEVICE — THE SIMPULSE SOLO SYSTEM WITH ULTREX RETRACTABLE SPLASH SHIELD TIP: Brand: SIMPULSE SOLO

## (undated) DEVICE — 3M™ STERI-DRAPE™ U-DRAPE 1015: Brand: STERI-DRAPE™

## (undated) DEVICE — HOOD: Brand: FLYTE

## (undated) DEVICE — BLADE OSCILLATOR 86.0 X 19.5MM

## (undated) DEVICE — GLOVE SRG BIOGEL 6.5

## (undated) DEVICE — GAUZE SPONGES,16 PLY: Brand: CURITY

## (undated) DEVICE — 450 ML BOTTLE OF 0.05% CHLORHEXIDINE GLUCONATE IN 99.95% STERILE WATER FOR IRRIGATION, USP AND APPLICATOR.: Brand: IRRISEPT ANTIMICROBIAL WOUND LAVAGE

## (undated) DEVICE — [HIGH FLOW INSUFFLATOR,  DO NOT USE IF PACKAGE IS DAMAGED,  KEEP DRY,  KEEP AWAY FROM SUNLIGHT,  PROTECT FROM HEAT AND RADIOACTIVE SOURCES.]: Brand: PNEUMOSURE

## (undated) DEVICE — DRAPE TOWEL: Brand: CONVERTORS

## (undated) DEVICE — VESSEL SEALER: Brand: ENDOWRIST;DAVINCI SI

## (undated) DEVICE — BLADELESS OBTURATOR: Brand: WECK VISTA

## (undated) DEVICE — PADDING CAST 6IN COTTON STRL

## (undated) DEVICE — TROCAR: Brand: KII FIOS FIRST ENTRY

## (undated) DEVICE — OCCLUSIVE GAUZE STRIP,3% BISMUTH TRIBROMOPHENATE IN PETROLATUM BLEND: Brand: XEROFORM

## (undated) DEVICE — ARM DRAPE

## (undated) DEVICE — U-DRAPE: Brand: CONVERTORS

## (undated) DEVICE — GLOVE SRG BIOGEL ECLIPSE 6.5

## (undated) DEVICE — LAPAROSCOPIC DUAL RIGID APPLICATOR: Brand: ETHICON

## (undated) DEVICE — COOL TEMP PAD

## (undated) DEVICE — SUT VICRYL 2-0 CT-1 36 IN J945H

## (undated) DEVICE — SUT ETHIBOND 0 CT-1 30 IN X424H

## (undated) DEVICE — SPONGE LAP 18 X 18 IN STRL RFD

## (undated) DEVICE — STRL PENROSE DRAIN 18" X 1/4": Brand: CARDINAL HEALTH

## (undated) DEVICE — INTENDED FOR TISSUE SEPARATION, AND OTHER PROCEDURES THAT REQUIRE A SHARP SURGICAL BLADE TO PUNCTURE OR CUT.: Brand: BARD-PARKER SAFETY BLADES SIZE 10, STERILE

## (undated) DEVICE — INTENDED FOR TISSUE SEPARATION, AND OTHER PROCEDURES THAT REQUIRE A SHARP SURGICAL BLADE TO PUNCTURE OR CUT.: Brand: BARD-PARKER SAFETY BLADES SIZE 15, STERILE

## (undated) DEVICE — ANTI-FOG SOLUTION WITH FOAM PAD: Brand: DEVON

## (undated) DEVICE — INTENDED FOR TISSUE SEPARATION, AND OTHER PROCEDURES THAT REQUIRE A SHARP SURGICAL BLADE TO PUNCTURE OR CUT.: Brand: BARD-PARKER ® CARBON RIB-BACK BLADES

## (undated) DEVICE — GLOVE SRG BIOGEL 7.5

## (undated) DEVICE — SYRINGE 30ML LL

## (undated) DEVICE — X-RAY DETECTABLE SPONGES,16 PLY: Brand: VISTEC

## (undated) DEVICE — STRL PENROSE DRAIN 18" X 1/2": Brand: CARDINAL HEALTH

## (undated) DEVICE — COBAN 6 IN STERILE

## (undated) DEVICE — NEEDLE SPINAL18G X 3.5 IN QUINCKE

## (undated) DEVICE — SUT VICRYL 1 CTX 36 IN J977H

## (undated) DEVICE — VIOLET BRAIDED (POLYGLACTIN 910), SYNTHETIC ABSORBABLE SUTURE: Brand: COATED VICRYL

## (undated) DEVICE — PLUMEPEN PRO 10FT

## (undated) DEVICE — PMI DISPOSABLE PUNCTURE CLOSURE DEVICE / SUTURE GRASPER: Brand: PMI

## (undated) DEVICE — MONOPOLAR CURVED SCISSORS: Brand: ENDOWRIST

## (undated) DEVICE — CANNULA SEAL

## (undated) DEVICE — IRRIG ENDO FLO TUBING

## (undated) DEVICE — 3M™ TEGADERM™ TRANSPARENT FILM DRESSING FRAME STYLE, 1626W, 4 IN X 4-3/4 IN (10 CM X 12 CM), 50/CT 4CT/CASE: Brand: 3M™ TEGADERM™

## (undated) DEVICE — CUFF TOURNIQUET 34 X 4 IN QUICK CONNECT DISP 1BLA

## (undated) DEVICE — 3000CC GUARDIAN II: Brand: GUARDIAN

## (undated) DEVICE — BLUE HEAT SCOPE WARMER

## (undated) DEVICE — GLOVE SRG BIOGEL ORTHOPEDIC 6.5

## (undated) DEVICE — SEALANT FIBRIN VISTASEAL 10ML

## (undated) DEVICE — BETHLEHEM UNIVERSAL MINOR GEN: Brand: CARDINAL HEALTH

## (undated) DEVICE — NEEDLE 18 G X 1 1/2

## (undated) DEVICE — IMPERVIOUS STOCKINETTE: Brand: DEROYAL

## (undated) DEVICE — SURGICAL GOWN, XL SMARTSLEEVE: Brand: CONVERTORS

## (undated) DEVICE — CEMENT MIXING CARTRIDGE PRISM II

## (undated) DEVICE — 2000CC GUARDIAN II: Brand: GUARDIAN

## (undated) DEVICE — CADIERE FORCEPS: Brand: ENDOWRIST

## (undated) DEVICE — GLOVE SRG BIOGEL 8

## (undated) DEVICE — GLOVE PI ULTRA TOUCH SZ 6

## (undated) DEVICE — GLOVE INDICATOR PI UNDERGLOVE SZ 6.5 BLUE

## (undated) DEVICE — GLOVE INDICATOR PI UNDERGLOVE SZ 7 BLUE

## (undated) DEVICE — BETHLEHEM UNIV TOTAL KNEE, KIT: Brand: CARDINAL HEALTH

## (undated) DEVICE — ASTOUND STANDARD SURGICAL GOWN, XL: Brand: CONVERTORS

## (undated) DEVICE — CAPIT KNEE ATTUNE FB CEMENT - DEPUY

## (undated) DEVICE — SKIN MARKER DUAL TIP WITH RULER CAP, FLEXIBLE RULER AND LABELS: Brand: DEVON

## (undated) DEVICE — 3M™ IOBAN™ 2 ANTIMICROBIAL INCISE DRAPE 6650EZ: Brand: IOBAN™ 2

## (undated) DEVICE — COTTON TIP APPLICTOR 2 PK

## (undated) DEVICE — COLUMN DRAPE

## (undated) DEVICE — DRESSING MEPILEX AG BORDER 4 X 8 IN

## (undated) DEVICE — PERMANENT CAUTERY HOOK: Brand: ENDOWRIST

## (undated) DEVICE — DRESSING VASELINE GAUZE 1 X 36

## (undated) DEVICE — FRAZIER SUCTION INSTRUMENT 18 FR W/OBTURATOR, NO CONTROL VENT: Brand: FRAZIER

## (undated) DEVICE — SPECIMEN CONTAINER STERILE PEEL PACK

## (undated) DEVICE — SIGMOIDOSCOPIC SUCTION INSTRUMENT 18 FR W/WINGED CAP CONTROL AND 6 FOOT (1.8M) TUBING: Brand: SIGMOIDOSCOPIC

## (undated) DEVICE — 10 MM BABCOCKS WITH RATCHET HANDLES: Brand: ENDOPATH